# Patient Record
Sex: MALE | Race: WHITE | NOT HISPANIC OR LATINO | Employment: OTHER | URBAN - METROPOLITAN AREA
[De-identification: names, ages, dates, MRNs, and addresses within clinical notes are randomized per-mention and may not be internally consistent; named-entity substitution may affect disease eponyms.]

---

## 2017-01-05 ENCOUNTER — GENERIC CONVERSION - ENCOUNTER (OUTPATIENT)
Dept: OTHER | Facility: OTHER | Age: 62
End: 2017-01-05

## 2017-02-01 ENCOUNTER — GENERIC CONVERSION - ENCOUNTER (OUTPATIENT)
Dept: OTHER | Facility: OTHER | Age: 62
End: 2017-02-01

## 2017-03-24 ENCOUNTER — TRANSCRIBE ORDERS (OUTPATIENT)
Dept: LAB | Facility: MEDICAL CENTER | Age: 62
End: 2017-03-24

## 2017-03-24 ENCOUNTER — APPOINTMENT (OUTPATIENT)
Dept: LAB | Facility: MEDICAL CENTER | Age: 62
End: 2017-03-24
Payer: COMMERCIAL

## 2017-03-24 DIAGNOSIS — I25.10 ATHEROSCLEROTIC HEART DISEASE OF NATIVE CORONARY ARTERY WITHOUT ANGINA PECTORIS: ICD-10-CM

## 2017-03-24 DIAGNOSIS — E55.9 VITAMIN D DEFICIENCY: ICD-10-CM

## 2017-03-24 DIAGNOSIS — E78.5 HYPERLIPIDEMIA: ICD-10-CM

## 2017-03-24 DIAGNOSIS — Z12.5 ENCOUNTER FOR SCREENING FOR MALIGNANT NEOPLASM OF PROSTATE: ICD-10-CM

## 2017-03-24 LAB
25(OH)D3 SERPL-MCNC: 24 NG/ML (ref 30–100)
ALBUMIN SERPL BCP-MCNC: 3.7 G/DL (ref 3.5–5)
ALP SERPL-CCNC: 44 U/L (ref 46–116)
ALT SERPL W P-5'-P-CCNC: 37 U/L (ref 12–78)
ANION GAP SERPL CALCULATED.3IONS-SCNC: 5 MMOL/L (ref 4–13)
AST SERPL W P-5'-P-CCNC: 29 U/L (ref 5–45)
BILIRUB SERPL-MCNC: 0.79 MG/DL (ref 0.2–1)
BUN SERPL-MCNC: 18 MG/DL (ref 5–25)
CALCIUM SERPL-MCNC: 9.3 MG/DL (ref 8.3–10.1)
CHLORIDE SERPL-SCNC: 104 MMOL/L (ref 100–108)
CHOLEST SERPL-MCNC: 186 MG/DL (ref 50–200)
CO2 SERPL-SCNC: 29 MMOL/L (ref 21–32)
CREAT SERPL-MCNC: 1.13 MG/DL (ref 0.6–1.3)
GFR SERPL CREATININE-BSD FRML MDRD: >60 ML/MIN/1.73SQ M
GLUCOSE P FAST SERPL-MCNC: 107 MG/DL (ref 65–99)
HDLC SERPL-MCNC: 50 MG/DL (ref 40–60)
LDLC SERPL CALC-MCNC: 105 MG/DL (ref 0–100)
POTASSIUM SERPL-SCNC: 4.9 MMOL/L (ref 3.5–5.3)
PROT SERPL-MCNC: 7.3 G/DL (ref 6.4–8.2)
PSA SERPL-MCNC: 0.6 NG/ML (ref 0–4)
SODIUM SERPL-SCNC: 138 MMOL/L (ref 136–145)
TRIGL SERPL-MCNC: 154 MG/DL

## 2017-03-24 PROCEDURE — 36415 COLL VENOUS BLD VENIPUNCTURE: CPT

## 2017-03-24 PROCEDURE — G0103 PSA SCREENING: HCPCS

## 2017-03-24 PROCEDURE — 82306 VITAMIN D 25 HYDROXY: CPT

## 2017-03-24 PROCEDURE — 80061 LIPID PANEL: CPT

## 2017-03-24 PROCEDURE — 80053 COMPREHEN METABOLIC PANEL: CPT

## 2017-05-06 ENCOUNTER — OFFICE VISIT (OUTPATIENT)
Dept: URGENT CARE | Facility: CLINIC | Age: 62
End: 2017-05-06
Payer: COMMERCIAL

## 2017-05-06 ENCOUNTER — HOSPITAL ENCOUNTER (OUTPATIENT)
Dept: RADIOLOGY | Facility: CLINIC | Age: 62
Discharge: HOME | End: 2017-05-06
Admitting: EMERGENCY MEDICINE
Payer: COMMERCIAL

## 2017-05-06 DIAGNOSIS — R23.8 OTHER SKIN CHANGES: ICD-10-CM

## 2017-05-06 PROCEDURE — 90471 IMMUNIZATION ADMIN: CPT

## 2017-05-06 PROCEDURE — 90715 TDAP VACCINE 7 YRS/> IM: CPT

## 2017-05-06 PROCEDURE — 73140 X-RAY EXAM OF FINGER(S): CPT

## 2017-05-06 PROCEDURE — 99213 OFFICE O/P EST LOW 20 MIN: CPT

## 2017-05-06 PROCEDURE — 29130 APPL FINGER SPLINT STATIC: CPT

## 2017-09-13 ENCOUNTER — GENERIC CONVERSION - ENCOUNTER (OUTPATIENT)
Dept: OTHER | Facility: OTHER | Age: 62
End: 2017-09-13

## 2018-01-03 ENCOUNTER — TRANSCRIBE ORDERS (OUTPATIENT)
Dept: RADIOLOGY | Facility: MEDICAL CENTER | Age: 63
End: 2018-01-03

## 2018-02-01 DIAGNOSIS — E78.2 MIXED HYPERLIPIDEMIA: Primary | ICD-10-CM

## 2018-02-01 RX ORDER — ROSUVASTATIN CALCIUM 20 MG/1
TABLET, COATED ORAL
Qty: 90 TABLET | Refills: 3 | Status: SHIPPED | OUTPATIENT
Start: 2018-02-01 | End: 2019-03-05 | Stop reason: SDUPTHER

## 2018-02-23 ENCOUNTER — TELEPHONE (OUTPATIENT)
Dept: INTERNAL MEDICINE CLINIC | Facility: CLINIC | Age: 63
End: 2018-02-23

## 2018-02-23 DIAGNOSIS — Z12.5 SCREENING FOR MALIGNANT NEOPLASM OF PROSTATE: ICD-10-CM

## 2018-02-23 DIAGNOSIS — E78.5 HYPERLIPIDEMIA, UNSPECIFIED HYPERLIPIDEMIA TYPE: Primary | ICD-10-CM

## 2018-02-23 DIAGNOSIS — R53.83 FATIGUE, UNSPECIFIED TYPE: ICD-10-CM

## 2018-02-23 DIAGNOSIS — Z13.820 SCREENING FOR OSTEOPOROSIS: ICD-10-CM

## 2018-03-19 ENCOUNTER — TRANSCRIBE ORDERS (OUTPATIENT)
Dept: LAB | Facility: MEDICAL CENTER | Age: 63
End: 2018-03-19

## 2018-03-19 ENCOUNTER — APPOINTMENT (OUTPATIENT)
Dept: LAB | Facility: MEDICAL CENTER | Age: 63
End: 2018-03-19
Payer: COMMERCIAL

## 2018-03-19 DIAGNOSIS — E78.5 HYPERLIPIDEMIA, UNSPECIFIED HYPERLIPIDEMIA TYPE: ICD-10-CM

## 2018-03-19 DIAGNOSIS — R53.83 FATIGUE, UNSPECIFIED TYPE: ICD-10-CM

## 2018-03-19 DIAGNOSIS — Z12.5 SCREENING FOR MALIGNANT NEOPLASM OF PROSTATE: ICD-10-CM

## 2018-03-19 DIAGNOSIS — Z13.820 SCREENING FOR OSTEOPOROSIS: ICD-10-CM

## 2018-03-19 LAB
25(OH)D3 SERPL-MCNC: 21.3 NG/ML (ref 30–100)
ALBUMIN SERPL BCP-MCNC: 3.7 G/DL (ref 3.5–5)
ALP SERPL-CCNC: 44 U/L (ref 46–116)
ALT SERPL W P-5'-P-CCNC: 24 U/L (ref 12–78)
ANION GAP SERPL CALCULATED.3IONS-SCNC: 8 MMOL/L (ref 4–13)
AST SERPL W P-5'-P-CCNC: 24 U/L (ref 5–45)
BASOPHILS # BLD AUTO: 0.01 THOUSANDS/ΜL (ref 0–0.1)
BASOPHILS NFR BLD AUTO: 0 % (ref 0–1)
BILIRUB SERPL-MCNC: 1.14 MG/DL (ref 0.2–1)
BUN SERPL-MCNC: 17 MG/DL (ref 5–25)
CALCIUM SERPL-MCNC: 8.6 MG/DL (ref 8.3–10.1)
CHLORIDE SERPL-SCNC: 106 MMOL/L (ref 100–108)
CHOLEST SERPL-MCNC: 172 MG/DL (ref 50–200)
CO2 SERPL-SCNC: 25 MMOL/L (ref 21–32)
CREAT SERPL-MCNC: 1.11 MG/DL (ref 0.6–1.3)
EOSINOPHIL # BLD AUTO: 0.22 THOUSAND/ΜL (ref 0–0.61)
EOSINOPHIL NFR BLD AUTO: 4 % (ref 0–6)
ERYTHROCYTE [DISTWIDTH] IN BLOOD BY AUTOMATED COUNT: 13.6 % (ref 11.6–15.1)
GFR SERPL CREATININE-BSD FRML MDRD: 70 ML/MIN/1.73SQ M
GLUCOSE P FAST SERPL-MCNC: 103 MG/DL (ref 65–99)
HCT VFR BLD AUTO: 46.6 % (ref 36.5–49.3)
HDLC SERPL-MCNC: 44 MG/DL (ref 40–60)
HGB BLD-MCNC: 16.1 G/DL (ref 12–17)
LDLC SERPL CALC-MCNC: 91 MG/DL (ref 0–100)
LYMPHOCYTES # BLD AUTO: 2.3 THOUSANDS/ΜL (ref 0.6–4.47)
LYMPHOCYTES NFR BLD AUTO: 38 % (ref 14–44)
MAGNESIUM SERPL-MCNC: 2.3 MG/DL (ref 1.6–2.6)
MCH RBC QN AUTO: 30.1 PG (ref 26.8–34.3)
MCHC RBC AUTO-ENTMCNC: 34.5 G/DL (ref 31.4–37.4)
MCV RBC AUTO: 87 FL (ref 82–98)
MONOCYTES # BLD AUTO: 0.57 THOUSAND/ΜL (ref 0.17–1.22)
MONOCYTES NFR BLD AUTO: 9 % (ref 4–12)
NEUTROPHILS # BLD AUTO: 2.95 THOUSANDS/ΜL (ref 1.85–7.62)
NEUTS SEG NFR BLD AUTO: 49 % (ref 43–75)
NRBC BLD AUTO-RTO: 0 /100 WBCS
PLATELET # BLD AUTO: 203 THOUSANDS/UL (ref 149–390)
PMV BLD AUTO: 12 FL (ref 8.9–12.7)
POTASSIUM SERPL-SCNC: 4.3 MMOL/L (ref 3.5–5.3)
PROT SERPL-MCNC: 7.1 G/DL (ref 6.4–8.2)
RBC # BLD AUTO: 5.35 MILLION/UL (ref 3.88–5.62)
SODIUM SERPL-SCNC: 139 MMOL/L (ref 136–145)
TRIGL SERPL-MCNC: 184 MG/DL
WBC # BLD AUTO: 6.07 THOUSAND/UL (ref 4.31–10.16)

## 2018-03-19 PROCEDURE — 36415 COLL VENOUS BLD VENIPUNCTURE: CPT

## 2018-03-19 PROCEDURE — 84154 ASSAY OF PSA FREE: CPT

## 2018-03-19 PROCEDURE — 83735 ASSAY OF MAGNESIUM: CPT

## 2018-03-19 PROCEDURE — 80061 LIPID PANEL: CPT

## 2018-03-19 PROCEDURE — 80053 COMPREHEN METABOLIC PANEL: CPT

## 2018-03-19 PROCEDURE — 82306 VITAMIN D 25 HYDROXY: CPT

## 2018-03-19 PROCEDURE — 84153 ASSAY OF PSA TOTAL: CPT

## 2018-03-19 PROCEDURE — 85025 COMPLETE CBC W/AUTO DIFF WBC: CPT

## 2018-03-20 LAB
PSA FREE MFR SERPL: 36.7 %
PSA FREE SERPL-MCNC: 0.22 NG/ML
PSA SERPL-MCNC: 0.6 NG/ML (ref 0–4)

## 2018-03-23 RX ORDER — NITROGLYCERIN 0.4 MG/1
1 TABLET SUBLINGUAL
COMMUNITY
Start: 2016-03-29 | End: 2018-11-01 | Stop reason: SDUPTHER

## 2018-03-23 RX ORDER — ESCITALOPRAM OXALATE 10 MG/1
1 TABLET ORAL DAILY
COMMUNITY
End: 2018-03-28 | Stop reason: SDUPTHER

## 2018-03-23 RX ORDER — OMEPRAZOLE 40 MG/1
CAPSULE, DELAYED RELEASE ORAL DAILY
COMMUNITY
Start: 2016-12-07 | End: 2018-03-28 | Stop reason: ALTCHOICE

## 2018-03-28 ENCOUNTER — OFFICE VISIT (OUTPATIENT)
Dept: INTERNAL MEDICINE CLINIC | Facility: CLINIC | Age: 63
End: 2018-03-28
Payer: COMMERCIAL

## 2018-03-28 VITALS
SYSTOLIC BLOOD PRESSURE: 124 MMHG | HEART RATE: 78 BPM | HEIGHT: 69 IN | BODY MASS INDEX: 38.4 KG/M2 | DIASTOLIC BLOOD PRESSURE: 70 MMHG | WEIGHT: 259.25 LBS | TEMPERATURE: 95.9 F | OXYGEN SATURATION: 96 %

## 2018-03-28 DIAGNOSIS — S29.019D THORACIC MYOFASCIAL STRAIN, SUBSEQUENT ENCOUNTER: Primary | ICD-10-CM

## 2018-03-28 DIAGNOSIS — I25.10 ATHEROSCLEROSIS OF NATIVE CORONARY ARTERY OF NATIVE HEART WITHOUT ANGINA PECTORIS: ICD-10-CM

## 2018-03-28 DIAGNOSIS — F41.9 ANXIETY: ICD-10-CM

## 2018-03-28 DIAGNOSIS — Z12.11 SCREENING FOR MALIGNANT NEOPLASM OF COLON: ICD-10-CM

## 2018-03-28 DIAGNOSIS — Z12.5 SCREENING FOR MALIGNANT NEOPLASM OF PROSTATE: ICD-10-CM

## 2018-03-28 DIAGNOSIS — I10 BENIGN ESSENTIAL HYPERTENSION: ICD-10-CM

## 2018-03-28 PROBLEM — M19.90 JOINT INFLAMMATION: Status: ACTIVE | Noted: 2017-05-30

## 2018-03-28 PROBLEM — S29.019A THORACIC MYOFASCIAL STRAIN: Status: ACTIVE | Noted: 2018-03-28

## 2018-03-28 PROCEDURE — 99396 PREV VISIT EST AGE 40-64: CPT | Performed by: INTERNAL MEDICINE

## 2018-03-28 RX ORDER — ESCITALOPRAM OXALATE 10 MG/1
10 TABLET ORAL DAILY
Qty: 90 TABLET | Refills: 3 | Status: SHIPPED | OUTPATIENT
Start: 2018-03-28 | End: 2019-09-20

## 2018-03-28 RX ORDER — BACLOFEN 10 MG/1
10 TABLET ORAL 2 TIMES DAILY
Qty: 20 TABLET | Refills: 0 | Status: SHIPPED | OUTPATIENT
Start: 2018-03-28 | End: 2018-12-06

## 2018-03-28 RX ORDER — NAPROXEN 500 MG/1
250 TABLET ORAL 2 TIMES DAILY WITH MEALS
Qty: 20 TABLET | Refills: 0 | Status: SHIPPED | OUTPATIENT
Start: 2018-03-28 | End: 2018-12-06

## 2018-03-28 NOTE — PROGRESS NOTES
Assessment/Plan:         Diagnoses and all orders for this visit:    Thoracic myofascial strain, subsequent encounter  Trial Naproxen and Baclofen for back pain    Screening for malignant neoplasm of colon  -     Occult blood 1-3, stool; Future      Anxiety  -     escitalopram (LEXAPRO) 10 mg tablet; Take 1 tablet (10 mg total) by mouth daily for 90 days    Atherosclerosis of native coronary artery of native heart without angina pectoris  Had cardiology appt in September, stable  No changes    Benign essential hypertension  Weight loss encouraged and discussed diet changes to assist and walking program    Other orders  -     escitalopram (LEXAPRO) 10 mg tablet; Take 1 tablet by mouth daily  -     Garlic Oil 5108 MG CAPS; Take 2 capsules by mouth daily  -     nitroglycerin (NITROSTAT) 0 4 mg SL tablet; Place 1 tablet under the tongue every 5 (five) minutes as needed  -     Discontinue: omeprazole (PriLOSEC) 40 MG capsule; Take by mouth daily  -     Discontinue: cholecalciferol (VITAMIN D3) 1,000 units tablet; Take by mouth  -     TURMERIC PO; Take by mouth       Patient ID: Gloria Clark is a 60 y o  male  HPI   Patient doing ok  Strained his back this week fixing a tractor  Had been using old muscle relaxant  No chest pain or sob  No falls  Saw cardio in September  Had labs recently    The following portions of the patient's history were reviewed and updated as appropriate:   Past Medical History:   Diagnosis Date    Cardiac disease     Myocardial infarction      Social History     Social History    Marital status: /Civil Union     Spouse name: N/A    Number of children: N/A    Years of education: N/A     Occupational History    Not on file       Social History Main Topics    Smoking status: Never Smoker    Smokeless tobacco: Never Used    Alcohol use No    Drug use: No    Sexual activity: Not on file     Other Topics Concern    Not on file     Social History Narrative    Dental care, regularly    Good dental hygiene    No caffeine use    Sun protection sunscreen         Allergies   Allergen Reactions    Penicillins Hives    Percocet [Oxycodone-Acetaminophen] Other (See Comments)     hallucinations     Past Surgical History:   Procedure Laterality Date    CORONARY STENT PLACEMENT      HERNIA REPAIR      OTHER SURGICAL HISTORY  09/18/2010    Arterial catheterization    TONSILLECTOMY AND ADENOIDECTOMY      TOTAL HIP ARTHROPLASTY       Family History   Problem Relation Age of Onset    Breast cancer Mother     Coronary artery disease Mother     Diabetes Mother     Hyperlipidemia Mother     Atrial fibrillation Father        Review of Systems   Constitutional: Negative  HENT: Negative  Eyes: Negative  Respiratory: Positive for shortness of breath  Cardiovascular: Negative  Gastrointestinal: Negative  Endocrine: Negative  Genitourinary: Negative  Musculoskeletal: Positive for back pain  Skin: Negative  Allergic/Immunologic: Negative  Neurological: Negative  Hematological: Negative  Psychiatric/Behavioral: Negative  /70   Pulse 78   Temp (!) 95 9 °F (35 5 °C) (Tympanic)   Ht 5' 9" (1 753 m)   Wt 118 kg (259 lb 4 oz)   SpO2 96%   BMI 38 28 kg/m²      Physical Exam   Constitutional: He is oriented to person, place, and time  He appears well-developed and well-nourished  No distress  HENT:   Head: Normocephalic and atraumatic  Right Ear: External ear normal    Left Ear: External ear normal    Nose: Nose normal    Mouth/Throat: Oropharynx is clear and moist  No oropharyngeal exudate  Eyes: Conjunctivae and EOM are normal  Pupils are equal, round, and reactive to light  No scleral icterus  Neck: Normal range of motion  Neck supple  No JVD present  Cardiovascular: Normal rate, regular rhythm, normal heart sounds and intact distal pulses  Pulmonary/Chest: Effort normal and breath sounds normal    Abdominal: Soft   Bowel sounds are normal    Musculoskeletal: He exhibits tenderness and deformity  He exhibits no edema  Lymphadenopathy:     He has no cervical adenopathy  Neurological: He is alert and oriented to person, place, and time  He has normal reflexes  He displays normal reflexes  No cranial nerve deficit  He exhibits normal muscle tone  Coordination normal    Skin: Skin is warm and dry  No rash noted  He is not diaphoretic  No erythema  No pallor  Psychiatric: He has a normal mood and affect  His behavior is normal  Judgment and thought content normal    Nursing note and vitals reviewed

## 2018-06-28 ENCOUNTER — TELEPHONE (OUTPATIENT)
Dept: INTERNAL MEDICINE CLINIC | Facility: CLINIC | Age: 63
End: 2018-06-28

## 2018-06-28 DIAGNOSIS — R21 RASH: Primary | ICD-10-CM

## 2018-06-28 RX ORDER — METHYLPREDNISOLONE 4 MG/1
TABLET ORAL
Qty: 21 TABLET | Refills: 0 | Status: SHIPPED | OUTPATIENT
Start: 2018-06-28 | End: 2018-12-06

## 2018-06-28 RX ORDER — TRIAMCINOLONE ACETONIDE 1 MG/G
CREAM TOPICAL 2 TIMES DAILY
Qty: 80 G | Refills: 0 | Status: SHIPPED | OUTPATIENT
Start: 2018-06-28 | End: 2018-12-06

## 2018-11-01 DIAGNOSIS — I25.10 ATHEROSCLEROSIS OF NATIVE CORONARY ARTERY WITHOUT ANGINA PECTORIS, UNSPECIFIED WHETHER NATIVE OR TRANSPLANTED HEART: Primary | ICD-10-CM

## 2018-11-01 RX ORDER — NITROGLYCERIN 0.4 MG/1
0.4 TABLET SUBLINGUAL
Qty: 25 TABLET | Refills: 3 | Status: SHIPPED | OUTPATIENT
Start: 2018-11-01

## 2018-12-06 ENCOUNTER — OFFICE VISIT (OUTPATIENT)
Dept: URGENT CARE | Facility: CLINIC | Age: 63
End: 2018-12-06
Payer: COMMERCIAL

## 2018-12-06 ENCOUNTER — APPOINTMENT (EMERGENCY)
Dept: RADIOLOGY | Facility: HOSPITAL | Age: 63
End: 2018-12-06
Payer: COMMERCIAL

## 2018-12-06 ENCOUNTER — TELEPHONE (OUTPATIENT)
Dept: INTERNAL MEDICINE CLINIC | Facility: CLINIC | Age: 63
End: 2018-12-06

## 2018-12-06 ENCOUNTER — HOSPITAL ENCOUNTER (EMERGENCY)
Facility: HOSPITAL | Age: 63
Discharge: HOME | End: 2018-12-06
Attending: EMERGENCY MEDICINE | Admitting: EMERGENCY MEDICINE
Payer: COMMERCIAL

## 2018-12-06 VITALS
WEIGHT: 261.47 LBS | RESPIRATION RATE: 18 BRPM | OXYGEN SATURATION: 98 % | BODY MASS INDEX: 38.73 KG/M2 | DIASTOLIC BLOOD PRESSURE: 73 MMHG | TEMPERATURE: 97.6 F | SYSTOLIC BLOOD PRESSURE: 141 MMHG | HEIGHT: 69 IN | HEART RATE: 50 BPM

## 2018-12-06 VITALS
OXYGEN SATURATION: 93 % | HEIGHT: 69 IN | DIASTOLIC BLOOD PRESSURE: 80 MMHG | BODY MASS INDEX: 38.21 KG/M2 | HEART RATE: 56 BPM | SYSTOLIC BLOOD PRESSURE: 150 MMHG | WEIGHT: 258 LBS | TEMPERATURE: 98.2 F | RESPIRATION RATE: 20 BRPM

## 2018-12-06 DIAGNOSIS — R07.9 CHEST PAIN, UNSPECIFIED TYPE: Primary | ICD-10-CM

## 2018-12-06 DIAGNOSIS — R07.9 LEFT SIDED CHEST PAIN: Primary | ICD-10-CM

## 2018-12-06 LAB
ALBUMIN SERPL BCP-MCNC: 3.5 G/DL (ref 3.5–5)
ALP SERPL-CCNC: 45 U/L (ref 46–116)
ALT SERPL W P-5'-P-CCNC: 33 U/L (ref 12–78)
ANION GAP SERPL CALCULATED.3IONS-SCNC: 8 MMOL/L (ref 4–13)
APTT PPP: 31 SECONDS (ref 26–38)
AST SERPL W P-5'-P-CCNC: 24 U/L (ref 5–45)
BASOPHILS # BLD AUTO: 0.02 THOUSANDS/ΜL (ref 0–0.1)
BASOPHILS NFR BLD AUTO: 0 % (ref 0–1)
BILIRUB SERPL-MCNC: 0.6 MG/DL (ref 0.2–1)
BUN SERPL-MCNC: 23 MG/DL (ref 5–25)
CALCIUM SERPL-MCNC: 8.9 MG/DL (ref 8.3–10.1)
CHLORIDE SERPL-SCNC: 103 MMOL/L (ref 100–108)
CO2 SERPL-SCNC: 28 MMOL/L (ref 21–32)
CREAT SERPL-MCNC: 1.21 MG/DL (ref 0.6–1.3)
EOSINOPHIL # BLD AUTO: 0.26 THOUSAND/ΜL (ref 0–0.61)
EOSINOPHIL NFR BLD AUTO: 4 % (ref 0–6)
ERYTHROCYTE [DISTWIDTH] IN BLOOD BY AUTOMATED COUNT: 12.8 % (ref 11.6–15.1)
GFR SERPL CREATININE-BSD FRML MDRD: 63 ML/MIN/1.73SQ M
GLUCOSE SERPL-MCNC: 95 MG/DL (ref 65–140)
HCT VFR BLD AUTO: 46 % (ref 36.5–49.3)
HGB BLD-MCNC: 15.9 G/DL (ref 12–17)
IMM GRANULOCYTES # BLD AUTO: 0.02 THOUSAND/UL (ref 0–0.2)
IMM GRANULOCYTES NFR BLD AUTO: 0 % (ref 0–2)
INR PPP: 1.06 (ref 0.86–1.17)
LIPASE SERPL-CCNC: 245 U/L (ref 73–393)
LYMPHOCYTES # BLD AUTO: 2.74 THOUSANDS/ΜL (ref 0.6–4.47)
LYMPHOCYTES NFR BLD AUTO: 38 % (ref 14–44)
MCH RBC QN AUTO: 30.4 PG (ref 26.8–34.3)
MCHC RBC AUTO-ENTMCNC: 34.6 G/DL (ref 31.4–37.4)
MCV RBC AUTO: 88 FL (ref 82–98)
MONOCYTES # BLD AUTO: 0.63 THOUSAND/ΜL (ref 0.17–1.22)
MONOCYTES NFR BLD AUTO: 9 % (ref 4–12)
NEUTROPHILS # BLD AUTO: 3.63 THOUSANDS/ΜL (ref 1.85–7.62)
NEUTS SEG NFR BLD AUTO: 49 % (ref 43–75)
NRBC BLD AUTO-RTO: 0 /100 WBCS
PLATELET # BLD AUTO: 207 THOUSANDS/UL (ref 149–390)
PMV BLD AUTO: 10.3 FL (ref 8.9–12.7)
POTASSIUM SERPL-SCNC: 4.2 MMOL/L (ref 3.5–5.3)
PROT SERPL-MCNC: 6.8 G/DL (ref 6.4–8.2)
PROTHROMBIN TIME: 13.3 SECONDS (ref 11.8–14.2)
RBC # BLD AUTO: 5.23 MILLION/UL (ref 3.88–5.62)
SODIUM SERPL-SCNC: 139 MMOL/L (ref 136–145)
TROPONIN I SERPL-MCNC: <0.02 NG/ML
TROPONIN I SERPL-MCNC: <0.02 NG/ML
WBC # BLD AUTO: 7.3 THOUSAND/UL (ref 4.31–10.16)

## 2018-12-06 PROCEDURE — 85610 PROTHROMBIN TIME: CPT | Performed by: PHYSICIAN ASSISTANT

## 2018-12-06 PROCEDURE — 71046 X-RAY EXAM CHEST 2 VIEWS: CPT

## 2018-12-06 PROCEDURE — 93005 ELECTROCARDIOGRAM TRACING: CPT | Performed by: PHYSICIAN ASSISTANT

## 2018-12-06 PROCEDURE — 85730 THROMBOPLASTIN TIME PARTIAL: CPT | Performed by: PHYSICIAN ASSISTANT

## 2018-12-06 PROCEDURE — 85025 COMPLETE CBC W/AUTO DIFF WBC: CPT | Performed by: PHYSICIAN ASSISTANT

## 2018-12-06 PROCEDURE — 80053 COMPREHEN METABOLIC PANEL: CPT | Performed by: PHYSICIAN ASSISTANT

## 2018-12-06 PROCEDURE — 99213 OFFICE O/P EST LOW 20 MIN: CPT | Performed by: PHYSICIAN ASSISTANT

## 2018-12-06 PROCEDURE — 83690 ASSAY OF LIPASE: CPT | Performed by: PHYSICIAN ASSISTANT

## 2018-12-06 PROCEDURE — 96374 THER/PROPH/DIAG INJ IV PUSH: CPT

## 2018-12-06 PROCEDURE — 36415 COLL VENOUS BLD VENIPUNCTURE: CPT | Performed by: PHYSICIAN ASSISTANT

## 2018-12-06 PROCEDURE — 84484 ASSAY OF TROPONIN QUANT: CPT | Performed by: PHYSICIAN ASSISTANT

## 2018-12-06 PROCEDURE — 93005 ELECTROCARDIOGRAM TRACING: CPT

## 2018-12-06 PROCEDURE — 99285 EMERGENCY DEPT VISIT HI MDM: CPT

## 2018-12-06 RX ORDER — ESCITALOPRAM OXALATE 10 MG/1
TABLET ORAL
COMMUNITY
Start: 2018-12-03 | End: 2019-03-05

## 2018-12-06 RX ORDER — KETOROLAC TROMETHAMINE 30 MG/ML
15 INJECTION, SOLUTION INTRAMUSCULAR; INTRAVENOUS ONCE
Status: COMPLETED | OUTPATIENT
Start: 2018-12-06 | End: 2018-12-06

## 2018-12-06 RX ADMIN — KETOROLAC TROMETHAMINE 15 MG: 30 INJECTION, SOLUTION INTRAMUSCULAR at 14:14

## 2018-12-06 NOTE — TELEPHONE ENCOUNTER
Can offer 12 45 appt tomorrow (Friday) but if he is having any chest pain with these sxs or sxs change/worsen   Should go to the ER today

## 2018-12-06 NOTE — TELEPHONE ENCOUNTER
Spoke with pt wife again and she said he's going to see how he feels in the next few days per PT and if he needs to he will go to the er  I did let her know that if any symptoms worsen or change to please have him go to the ER    MARK

## 2018-12-06 NOTE — TELEPHONE ENCOUNTER
Pt wife called that Ginny Fenton is having soreness on left side under his arm toward his chest for a few days  Wondering what to do?

## 2018-12-06 NOTE — PROGRESS NOTES
[address] [facility]  ([facility]) [phone]  (fax) [phone]        NAME: Prabhjot Bey is a 58 y o  male  : 1955    MRN: [id number]  DATE: 2018  TIME: 12:11 PM    Assessment and Plan   Chest pain, unspecified type [R07 9]  1  Chest pain, unspecified type  POCT ECG    Transfer to other facility       Patient Instructions   EKG appears to have no acute ST elevation, + bradycardia  Called and spoke with Juana Gamboa to update her on patient's status  Patient's wife to drive him to [address] [facility] via private vehicle  Patient left clinic in no acute distress  Proceed to ER  Chief Complaint     Chief Complaint   Patient presents with    Chest Pain     left sided chest pain with numbness going down left arm x 2 days          History of Present Illness   Prabhjot Bey presents to the clinic c/o    Denies any trauma to area, no recent falls, denies lifting anything overly heavy  Patient does follow with cardio  Had 3 stents placed  Patient did not take any nitro for this pain  Chest Pain    This is a new problem  The current episode started in the past 7 days  The onset quality is undetermined  The problem occurs constantly  The problem has been unchanged  The pain is present in the substernal region and lateral region  The pain is mild  The quality of the pain is described as dull  The pain radiates to the left arm and left shoulder  Associated symptoms include a cough and numbness (left arm)  Pertinent negatives include no abdominal pain, back pain, diaphoresis, dizziness, fever, headaches, nausea, palpitations, shortness of breath, vomiting or weakness  Review of Systems   Review of Systems   Constitutional: Negative for activity change, appetite change, chills, diaphoresis, fatigue and fever     HENT: Negative for congestion, ear discharge, ear pain, facial swelling, rhinorrhea, sinus pain, sinus pressure, sneezing and sore throat  Eyes: Negative for photophobia, pain, discharge, redness, itching and visual disturbance  Respiratory: Positive for cough  Negative for apnea, chest tightness, shortness of breath and wheezing  Cardiovascular: Positive for chest pain  Negative for palpitations  Gastrointestinal: Negative for abdominal distention, abdominal pain, anal bleeding, blood in stool, constipation, diarrhea, nausea and vomiting  Genitourinary: Negative for dysuria, flank pain, frequency, hematuria and urgency  Musculoskeletal: Negative for arthralgias, back pain, gait problem, joint swelling, myalgias, neck pain and neck stiffness  Skin: Negative for color change, rash and wound  Allergic/Immunologic: Negative for immunocompromised state  Neurological: Positive for numbness (left arm)  Negative for dizziness, facial asymmetry, weakness and headaches  Hematological: Negative for adenopathy  Psychiatric/Behavioral: Negative for confusion and decreased concentration           Current Medications     Long-Term Prescriptions   Medication Sig Dispense Refill    nitroglycerin (NITROSTAT) 0 4 mg SL tablet Place 1 tablet (0 4 mg total) under the tongue every 5 (five) minutes as needed for chest pain 25 tablet 3    rosuvastatin (CRESTOR) 20 MG tablet TAKE 1 TABLET DAILY 90 tablet 3    escitalopram (LEXAPRO) 10 mg tablet Take 1 tablet (10 mg total) by mouth daily for 90 days 90 tablet 3    escitalopram (LEXAPRO) 10 mg tablet          Current Allergies     Allergies as of 12/06/2018 - Reviewed 12/06/2018   Allergen Reaction Noted    Other Other (See Comments)     Oxycodone-acetaminophen Other (See Comments)     Percocet [oxycodone-acetaminophen] Other (See Comments) 01/27/2016    Penicillins Hives and Rash 01/05/2012            The following portions of the patient's history were reviewed and updated as appropriate: allergies, current medications, past family history, past medical history, past social history, past surgical history and problem list   Past Medical History:   Diagnosis Date    Cardiac disease     Myocardial infarction [facility])      Past Surgical History:   Procedure Laterality Date    CORONARY STENT PLACEMENT      HERNIA REPAIR      OTHER SURGICAL HISTORY  09/18/2010    Arterial catheterization    TONSILLECTOMY AND ADENOIDECTOMY      TOTAL HIP ARTHROPLASTY       Social History     Social History    Marital status: /Civil Union     Spouse name: N/A    Number of children: N/A    Years of education: N/A     Occupational History    Not on file  Social History Main Topics    Smoking status: Never Smoker    Smokeless tobacco: Never Used    Alcohol use No    Drug use: No    Sexual activity: Not on file     Other Topics Concern    Not on file     Social History Narrative    Dental care, regularly    Good dental hygiene    No caffeine use    Sun protection sunscreen           Objective   /80   Pulse 56   Temp 98 2 °F (36 8 °C)   Resp 20   Ht 5' 9" (1 753 m)   Wt 117 kg (258 lb)   SpO2 93%   BMI 38 10 kg/m²      Physical Exam     Physical Exam   Constitutional: He is oriented to person, place, and time  He appears well-developed and well-nourished  No distress  HENT:   Head: Normocephalic and atraumatic  Eyes: Pupils are equal, round, and reactive to light  Conjunctivae are normal  Right eye exhibits no discharge  Left eye exhibits no discharge  Cardiovascular: Regular rhythm and normal heart sounds  Exam reveals no gallop and no friction rub  No murmur heard  Pulmonary/Chest: Effort normal and breath sounds normal  No respiratory distress  He has no decreased breath sounds  He has no wheezes  He has no rhonchi  He has no rales  He exhibits no tenderness  Abdominal: Soft  Bowel sounds are normal  He exhibits no distension and no mass  There is no tenderness  There is no rebound and no guarding  Neurological: He is alert and oriented to person, place, and time  Coordination normal    Skin: Skin is warm and dry  No rash noted  He is not diaphoretic  No erythema  No pallor         Leana Granda PA-C

## 2018-12-06 NOTE — DISCHARGE INSTRUCTIONS

## 2018-12-06 NOTE — ED PROVIDER NOTES
History  Chief Complaint   Patient presents with    Chest Pain     intermittent   left sided chest pain for 3 days  More constant today  History of MI in 2010 with stents  History provided by:  Patient and medical records  Chest Pain   Pain location:  L chest and L lateral chest  Pain quality: aching    Pain radiates to:  L arm  Pain radiates to the back: no    Pain severity:  Mild  Onset quality:  Gradual  Duration:  3 days  Timing:  Constant  Progression:  Waxing and waning  Chronicity:  New  Context: movement and at rest    Context: not breathing, no drug use, not eating, not lifting, not raising an arm, no stress and no trauma    Context comment:  More noticeable when resting and riding tractor sitting still  goes away when he's busy and active or distracted  Relieved by:  Nothing  Worsened by:  Nothing tried  Ineffective treatments:  None tried  Associated symptoms: cough (pt reports chronic cough for weeks to months  worse in past 2 days after sweeping his chimney dust)    Associated symptoms: no abdominal pain, no altered mental status, no anorexia, no anxiety, no back pain, no claudication, no diaphoresis, no dizziness, no dysphagia, no fatigue, no fever, no headache, no heartburn, no lower extremity edema, no nausea, no near-syncope, no numbness, no orthopnea, no palpitations, no PND, no shortness of breath, no syncope, not vomiting and no weakness    Cough:     Cough characteristics:  Dry and non-productive    Timing:  Intermittent    Chronicity:  Chronic  Risk factors: coronary artery disease, high cholesterol, male sex and obesity    Risk factors: no aortic disease, no birth control, no diabetes mellitus, no hypertension, no prior DVT/PE and no smoking        Prior to Admission Medications   Prescriptions Last Dose Informant Patient Reported? Taking? Garlic Oil 6236 MG CAPS   Yes No   Sig: Take 2 capsules by mouth daily   aspirin 81 MG tablet   Yes No   Sig: Take 81 mg by mouth daily  escitalopram (LEXAPRO) 10 mg tablet   No No   Sig: Take 1 tablet (10 mg total) by mouth daily for 90 days   escitalopram (LEXAPRO) 10 mg tablet   Yes No   nitroglycerin (NITROSTAT) 0 4 mg SL tablet   No No   Sig: Place 1 tablet (0 4 mg total) under the tongue every 5 (five) minutes as needed for chest pain   rosuvastatin (CRESTOR) 20 MG tablet   No No   Sig: TAKE 1 TABLET DAILY      Facility-Administered Medications: None       Past Medical History:   Diagnosis Date    Cardiac disease     Hypertension     Myocardial infarction [facility])        Past Surgical History:   Procedure Laterality Date    CORONARY STENT PLACEMENT      HERNIA REPAIR      OTHER SURGICAL HISTORY  09/18/2010    Arterial catheterization    TONSILLECTOMY AND ADENOIDECTOMY      TOTAL HIP ARTHROPLASTY         Family History   Problem Relation Age of Onset    Breast cancer Mother     Coronary artery disease Mother     Diabetes Mother     Hyperlipidemia Mother     Atrial fibrillation Father      I have reviewed and agree with the history as documented  Social History   Substance Use Topics    Smoking status: Never Smoker    Smokeless tobacco: Never Used    Alcohol use No        Review of Systems   Constitutional: Negative for activity change, appetite change, chills, diaphoresis, fatigue and fever  HENT: Negative for congestion, ear pain, facial swelling, hearing loss, rhinorrhea, sinus pressure, sore throat and trouble swallowing  Eyes: Negative for photophobia, pain, redness, itching and visual disturbance  Respiratory: Positive for cough (pt reports chronic cough for weeks to months  worse in past 2 days after sweeping his chimney dust)  Negative for chest tightness, shortness of breath and wheezing  Cardiovascular: Positive for chest pain  Negative for palpitations, orthopnea, claudication, leg swelling, syncope, PND and near-syncope     Gastrointestinal: Negative for abdominal pain, anorexia, constipation, diarrhea, heartburn, nausea and vomiting  Genitourinary: Negative for dysuria, flank pain, frequency, hematuria and urgency  Musculoskeletal: Negative for arthralgias, back pain, myalgias and neck pain  Skin: Negative for rash and wound  Allergic/Immunologic: Negative for immunocompromised state  Neurological: Negative for dizziness, syncope, weakness, light-headedness, numbness and headaches  Physical Exam  Physical Exam   Constitutional: He is oriented to person, place, and time  He appears well-developed and well-nourished  No distress  HENT:   Head: Normocephalic and atraumatic  Nose: Nose normal    Mouth/Throat: Oropharynx is clear and moist    Eyes: Pupils are equal, round, and reactive to light  Conjunctivae are normal    Neck: Neck supple  Cardiovascular: Normal rate, regular rhythm, normal heart sounds and intact distal pulses  No murmur heard  Pulmonary/Chest: Effort normal and breath sounds normal  No respiratory distress  He has no wheezes  He has no rales  He exhibits no tenderness  Abdominal: Soft  Bowel sounds are normal    Musculoskeletal: He exhibits no edema or tenderness  Neurological: He is alert and oriented to person, place, and time  Skin: Skin is warm and dry  Capillary refill takes less than 2 seconds  No rash noted  He is not diaphoretic  No erythema  No pallor  Psychiatric: He has a normal mood and affect  Nursing note and vitals reviewed        Vital Signs  ED Triage Vitals [12/06/18 1229]   Temperature Pulse Respirations Blood Pressure SpO2   97 6 °F (36 4 °C) 55 17 134/67 95 %      Temp Source Heart Rate Source Patient Position - Orthostatic VS BP Location FiO2 (%)   Temporal Monitor Lying Left arm --      Pain Score       3           Vitals:    12/06/18 1229 12/06/18 1414 12/06/18 1600   BP: 134/67 146/66 141/73   Pulse: 55 (!) 50 (!) 50   Patient Position - Orthostatic VS: Lying         Visual Acuity      ED Medications  Medications   ketorolac (TORADOL) injection 15 mg (15 mg Intravenous Given 12/6/18 1414)       Diagnostic Studies  Results Reviewed     Procedure Component Value Units Date/Time    Troponin I [[id number]]  (Normal) Collected:  12/06/18 1540    Lab Status:  Final result Specimen:  Blood from Arm, Left Updated:  12/06/18 1603     Troponin I <0 02 ng/mL     Troponin I [[id number]]  (Normal) Collected:  12/06/18 1236    Lab Status:  Final result Specimen:  Blood from Arm, Right Updated:  12/06/18 1309     Troponin I <0 02 ng/mL     Comprehensive metabolic panel [[id number]]  (Abnormal) Collected:  12/06/18 1236    Lab Status:  Final result Specimen:  Blood from Arm, Right Updated:  12/06/18 1304     Sodium 139 mmol/L      Potassium 4 2 mmol/L      Chloride 103 mmol/L      CO2 28 mmol/L      ANION GAP 8 mmol/L      BUN 23 mg/dL      Creatinine 1 21 mg/dL      Glucose 95 mg/dL      Calcium 8 9 mg/dL      AST 24 U/L      ALT 33 U/L      Alkaline Phosphatase 45 (L) U/L      Total Protein 6 8 g/dL      Albumin 3 5 g/dL      Total Bilirubin 0 60 mg/dL      eGFR 63 ml/min/1 73sq m     Narrative:         National Kidney Disease Education Program recommendations are as follows:  GFR calculation is accurate only with a steady state creatinine  Chronic Kidney disease less than 60 ml/min/1 73 sq  meters  Kidney failure less than 15 ml/min/1 73 sq  meters      Lipase [[id number]]  (Normal) Collected:  12/06/18 1236    Lab Status:  Final result Specimen:  Blood from Arm, Right Updated:  12/06/18 1304     Lipase 245 u/L     Protime-INR [[id number]]  (Normal) Collected:  12/06/18 1236    Lab Status:  Final result Specimen:  Blood from Arm, Right Updated:  12/06/18 1252     Protime 13 3 seconds      INR 1 06    APTT [[id number]]  (Normal) Collected:  12/06/18 1236    Lab Status:  Final result Specimen:  Blood from Arm, Right Updated:  12/06/18 1252     PTT 31 seconds     CBC and differential [[id number]] Collected:  12/06/18 1236    Lab Status:  Final result Specimen:  Blood from Arm, Right Updated:  12/06/18 1241     WBC 7 30 Thousand/uL      RBC 5 23 Million/uL      Hemoglobin 15 9 g/dL      Hematocrit 46 0 %      MCV 88 fL      MCH 30 4 pg      MCHC 34 6 g/dL      RDW 12 8 %      MPV 10 3 fL      Platelets 069 Thousands/uL      nRBC 0 /100 WBCs      Neutrophils Relative 49 %      Immat GRANS % 0 %      Lymphocytes Relative 38 %      Monocytes Relative 9 %      Eosinophils Relative 4 %      Basophils Relative 0 %      Neutrophils Absolute 3 63 Thousands/µL      Immature Grans Absolute 0 02 Thousand/uL      Lymphocytes Absolute 2 74 Thousands/µL      Monocytes Absolute 0 63 Thousand/µL      Eosinophils Absolute 0 26 Thousand/µL      Basophils Absolute 0 02 Thousands/µL                  XR chest 2 views   Final Result by Pawel Terrazas MD (12/06 1329)      Normal chest              Workstation performed: [id number]                    Procedures  ECG 12 Lead Documentation  Date/Time: 12/6/2018 12:28 PM  Performed by: Edilberto Montalvo  Authorized by: Edilberto Montalvo     Indications / Diagnosis:  Chest pain  ECG reviewed by me, the ED Provider: yes    Patient location:  ED  Previous ECG:     Previous ECG:  Compared to current    Similarity:  No change  Rate:     ECG rate:  57  Rhythm:     Rhythm: sinus bradycardia    Ectopy:     Ectopy: none    QRS:     QRS axis:  Normal  Conduction:     Conduction: normal    ST segments:     ST segments:  Normal  T waves:     T waves: normal    Q waves:     Q waves:  III    ECG 12 Lead Documentation  Date/Time: 12/6/2018 3:44 PM  Performed by: Edilberto Montalvo  Authorized by: Edilberto Montalvo     Indications / Diagnosis:  Delta 3 hr cp  ECG reviewed by me, the ED Provider: yes    Patient location:  ED  Previous ECG:     Previous ECG:  Compared to current    Comparison ECG info:  3 hr prior    Similarity:  No change  Rate:     ECG rate:  52  Rhythm:     Rhythm: sinus bradycardia    Ectopy:     Ectopy: none    QRS:     QRS axis:  Normal  Conduction: Conduction: normal    ST segments:     ST segments:  Normal  T waves:     T waves: normal    Q waves:     Q waves:  III        Phone Contacts  ED Phone Contact    ED Course  ED Course as of Dec 06 1626   Thu Dec 06, 2018   1308 WBC: 7 30   1308 Hemoglobin: 15 9   1308 HCT: 46 0   1308 Platelet Count: 753   1308 INR: 1 06   1308 Protime: 13 3   1308 PTT: 31   1308 Lipase: 245   1308 Sodium: 139   1308 Potassium: 4 2   1308 Chloride: 103   1308 CO2: 28   1308 Anion Gap: 8   1308 BUN: 23   1308 Creatinine: 1 21   1308 Glucose, Random: 95   1308 eGFR: 63   1317 Troponin I: <0 02   1358 Reviewed normal results thus far with patient  Offered 3 hr delta troponin, discussed HEART score 3 and MACE reduction with 3 hr delta  Pt and wife agreeable to same  Will treat with toradol in meantime for suspected musculoskeletal component of pain  1603 Troponin I: <0 02   1616 Pt with negative delta ekg/trop  He is eager for discharge  Up ambulatory in department, stable  Will continue to utilize motrin/tylenol for chest/arm discomfort that is minimal  Stress ordered and cc'ed to his cardiologist     I called pcp office dr Ximena Prince office no answer may be out of office for the day  Initially pt was offered 1245 appt for tomorrow Friday but is not scheduled in [facility] I was hoping to establish that visit time for patient for tomorrow  Pt will call in AM to see if appt slot still available            HEART Risk Score      Most Recent Value   History  0 Filed at: 12/06/2018 1318   ECG  0 Filed at: 12/06/2018 1318   Age  1 Filed at: 12/06/2018 1318   Risk Factors  2 Filed at: 12/06/2018 1318   Troponin  0 Filed at: 12/06/2018 1318   Heart Score Risk Calculator   History  0 Filed at: 12/06/2018 1318   ECG  0 Filed at: 12/06/2018 1318   Age  1 Filed at: 12/06/2018 1318   Risk Factors  2 Filed at: 12/06/2018 1318   Troponin  0 Filed at: 12/06/2018 1318   HEART Score  3 Filed at: 12/06/2018 1318   HEART Score  3 Filed at: 12/06/2018 1318 MDM  Number of Diagnoses or Management Options  Left sided chest pain: new and requires workup     Amount and/or Complexity of Data Reviewed  Clinical lab tests: ordered and reviewed  Tests in the radiology section of CPT®: ordered and reviewed  Obtain history from someone other than the patient: yes (Spouse, urgent care provider, med rec)  Review and summarize past medical records: yes  Discuss the patient with other providers: yes  Independent visualization of images, tracings, or specimens: yes    Risk of Complications, Morbidity, and/or Mortality  Presenting problems: high  Diagnostic procedures: high  Management options: high    Patient Progress  Patient progress: stable    CritCare Time    Disposition  Final diagnoses:   Left sided chest pain     Time reflects when diagnosis was documented in both MDM as applicable and the Disposition within this note     Time User Action Codes Description Comment    12/6/2018  4:04 PM Kirby Person Add [R07 9] Left sided chest pain       ED Disposition     ED Disposition Condition Comment    Discharge  New Huang discharge to home/self care  Condition at discharge: Good        Follow-up Information     Follow up With Specialties Details Why [facility],  Internal Medicine Schedule an appointment as soon as possible for a visit PCP followup [address] [id number]      Paulo Darling MD Internal Medicine Schedule an appointment as soon as possible for a visit cardiology followup [address] [place] [address]  [phone]            Discharge Medication List as of 12/6/2018  4:07 PM      CONTINUE these medications which have NOT CHANGED    Details   aspirin 81 MG tablet Take 81 mg by mouth daily  , Until Discontinued, Historical Med      escitalopram (LEXAPRO) 10 mg tablet Starting Mon 12/3/2018, Historical Med      Garlic Oil 9775 MG CAPS Take 2 capsules by mouth daily, Historical Med nitroglycerin (NITROSTAT) 0 4 mg SL tablet Place 1 tablet (0 4 mg total) under the tongue every 5 (five) minutes as needed for chest pain, Starting Thu 11/1/2018, Normal      rosuvastatin (CRESTOR) 20 MG tablet TAKE 1 TABLET DAILY, Normal             Outpatient Discharge Orders  Echo stress test w contrast if indicated   Standing Status: Future  Standing Exp   Date: 12/06/22         ED Provider  Electronically Signed by           José Manuel Gunn PA-C  12/06/18 0933

## 2018-12-07 LAB
ATRIAL RATE: 52 BPM
ATRIAL RATE: 54 BPM
ATRIAL RATE: 57 BPM
P AXIS: -14 DEGREES
P AXIS: -16 DEGREES
P AXIS: -8 DEGREES
PR INTERVAL: 150 MS
PR INTERVAL: 158 MS
PR INTERVAL: 168 MS
QRS AXIS: -6 DEGREES
QRS AXIS: 17 DEGREES
QRS AXIS: 7 DEGREES
QRSD INTERVAL: 68 MS
QRSD INTERVAL: 76 MS
QRSD INTERVAL: 86 MS
QT INTERVAL: 418 MS
QT INTERVAL: 432 MS
QT INTERVAL: 452 MS
QTC INTERVAL: 406 MS
QTC INTERVAL: 409 MS
QTC INTERVAL: 420 MS
T WAVE AXIS: 17 DEGREES
T WAVE AXIS: 17 DEGREES
T WAVE AXIS: 46 DEGREES
VENTRICULAR RATE: 52 BPM
VENTRICULAR RATE: 54 BPM
VENTRICULAR RATE: 57 BPM

## 2018-12-07 PROCEDURE — 93010 ELECTROCARDIOGRAM REPORT: CPT | Performed by: INTERNAL MEDICINE

## 2019-03-05 ENCOUNTER — APPOINTMENT (OUTPATIENT)
Dept: LAB | Facility: CLINIC | Age: 64
End: 2019-03-05
Payer: COMMERCIAL

## 2019-03-05 ENCOUNTER — OFFICE VISIT (OUTPATIENT)
Dept: INTERNAL MEDICINE CLINIC | Facility: CLINIC | Age: 64
End: 2019-03-05
Payer: COMMERCIAL

## 2019-03-05 VITALS
SYSTOLIC BLOOD PRESSURE: 150 MMHG | HEART RATE: 61 BPM | DIASTOLIC BLOOD PRESSURE: 92 MMHG | BODY MASS INDEX: 39.18 KG/M2 | HEIGHT: 69 IN | WEIGHT: 264.5 LBS | OXYGEN SATURATION: 100 % | TEMPERATURE: 98 F

## 2019-03-05 DIAGNOSIS — I10 BENIGN ESSENTIAL HYPERTENSION: ICD-10-CM

## 2019-03-05 DIAGNOSIS — E78.2 MIXED HYPERLIPIDEMIA: ICD-10-CM

## 2019-03-05 DIAGNOSIS — I25.10 ATHEROSCLEROSIS OF NATIVE CORONARY ARTERY OF NATIVE HEART WITHOUT ANGINA PECTORIS: ICD-10-CM

## 2019-03-05 DIAGNOSIS — E55.9 VITAMIN D DEFICIENCY: ICD-10-CM

## 2019-03-05 DIAGNOSIS — Z11.59 NEED FOR HEPATITIS C SCREENING TEST: ICD-10-CM

## 2019-03-05 DIAGNOSIS — E66.01 SEVERE OBESITY (BMI 35.0-39.9) WITH COMORBIDITY (HCC): ICD-10-CM

## 2019-03-05 DIAGNOSIS — M47.817 LUMBOSACRAL SPONDYLOSIS WITHOUT MYELOPATHY: ICD-10-CM

## 2019-03-05 DIAGNOSIS — Z95.5 S/P CORONARY ARTERY STENT PLACEMENT: ICD-10-CM

## 2019-03-05 DIAGNOSIS — G56.01 CARPAL TUNNEL SYNDROME, RIGHT: ICD-10-CM

## 2019-03-05 DIAGNOSIS — Z12.11 SCREENING FOR COLON CANCER: Primary | ICD-10-CM

## 2019-03-05 PROBLEM — H44.609: Status: ACTIVE | Noted: 2019-03-05

## 2019-03-05 PROBLEM — M41.9 ACQUIRED SCOLIOSIS: Status: ACTIVE | Noted: 2019-03-05

## 2019-03-05 PROBLEM — M19.039 LOCALIZED PRIMARY OSTEOARTHRITIS OF WRIST: Status: ACTIVE | Noted: 2017-02-01

## 2019-03-05 PROBLEM — E78.00 PURE HYPERCHOLESTEROLEMIA: Status: ACTIVE | Noted: 2018-09-04

## 2019-03-05 PROBLEM — E66.3 OVERWEIGHT: Status: ACTIVE | Noted: 2018-09-04

## 2019-03-05 PROBLEM — I65.29 CAROTID ARTERY OCCLUSION: Status: ACTIVE | Noted: 2019-03-05

## 2019-03-05 PROBLEM — M50.00 INTERVERTEBRAL DISC DISORDER OF CERVICAL REGION WITH MYELOPATHY: Status: ACTIVE | Noted: 2019-03-05

## 2019-03-05 PROBLEM — S29.019A THORACIC MYOFASCIAL STRAIN: Status: RESOLVED | Noted: 2018-03-28 | Resolved: 2019-03-05

## 2019-03-05 LAB
25(OH)D3 SERPL-MCNC: 24.2 NG/ML (ref 30–100)
ALBUMIN SERPL BCP-MCNC: 4.1 G/DL (ref 3.5–5)
ALP SERPL-CCNC: 48 U/L (ref 46–116)
ALT SERPL W P-5'-P-CCNC: 34 U/L (ref 12–78)
ANION GAP SERPL CALCULATED.3IONS-SCNC: 6 MMOL/L (ref 4–13)
AST SERPL W P-5'-P-CCNC: 30 U/L (ref 5–45)
BASOPHILS # BLD AUTO: 0.03 THOUSANDS/ΜL (ref 0–0.1)
BASOPHILS NFR BLD AUTO: 1 % (ref 0–1)
BILIRUB SERPL-MCNC: 0.82 MG/DL (ref 0.2–1)
BUN SERPL-MCNC: 16 MG/DL (ref 5–25)
CALCIUM SERPL-MCNC: 8.9 MG/DL (ref 8.3–10.1)
CHLORIDE SERPL-SCNC: 105 MMOL/L (ref 100–108)
CHOLEST SERPL-MCNC: 189 MG/DL (ref 50–200)
CO2 SERPL-SCNC: 27 MMOL/L (ref 21–32)
CREAT SERPL-MCNC: 1.14 MG/DL (ref 0.6–1.3)
EOSINOPHIL # BLD AUTO: 0.24 THOUSAND/ΜL (ref 0–0.61)
EOSINOPHIL NFR BLD AUTO: 4 % (ref 0–6)
ERYTHROCYTE [DISTWIDTH] IN BLOOD BY AUTOMATED COUNT: 13.2 % (ref 11.6–15.1)
GFR SERPL CREATININE-BSD FRML MDRD: 68 ML/MIN/1.73SQ M
GLUCOSE P FAST SERPL-MCNC: 99 MG/DL (ref 65–99)
HCT VFR BLD AUTO: 52 % (ref 36.5–49.3)
HDLC SERPL-MCNC: 48 MG/DL (ref 40–60)
HGB BLD-MCNC: 17.6 G/DL (ref 12–17)
IMM GRANULOCYTES # BLD AUTO: 0.01 THOUSAND/UL (ref 0–0.2)
IMM GRANULOCYTES NFR BLD AUTO: 0 % (ref 0–2)
LDLC SERPL CALC-MCNC: 106 MG/DL (ref 0–100)
LYMPHOCYTES # BLD AUTO: 2.27 THOUSANDS/ΜL (ref 0.6–4.47)
LYMPHOCYTES NFR BLD AUTO: 37 % (ref 14–44)
MCH RBC QN AUTO: 30.2 PG (ref 26.8–34.3)
MCHC RBC AUTO-ENTMCNC: 33.8 G/DL (ref 31.4–37.4)
MCV RBC AUTO: 89 FL (ref 82–98)
MONOCYTES # BLD AUTO: 0.54 THOUSAND/ΜL (ref 0.17–1.22)
MONOCYTES NFR BLD AUTO: 9 % (ref 4–12)
NEUTROPHILS # BLD AUTO: 3.12 THOUSANDS/ΜL (ref 1.85–7.62)
NEUTS SEG NFR BLD AUTO: 49 % (ref 43–75)
NONHDLC SERPL-MCNC: 141 MG/DL
NRBC BLD AUTO-RTO: 0 /100 WBCS
PLATELET # BLD AUTO: 221 THOUSANDS/UL (ref 149–390)
PMV BLD AUTO: 11.6 FL (ref 8.9–12.7)
POTASSIUM SERPL-SCNC: 4.2 MMOL/L (ref 3.5–5.3)
PROT SERPL-MCNC: 7.5 G/DL (ref 6.4–8.2)
RBC # BLD AUTO: 5.83 MILLION/UL (ref 3.88–5.62)
SODIUM SERPL-SCNC: 138 MMOL/L (ref 136–145)
TRIGL SERPL-MCNC: 175 MG/DL
TSH SERPL DL<=0.05 MIU/L-ACNC: 3.58 UIU/ML (ref 0.36–3.74)
WBC # BLD AUTO: 6.21 THOUSAND/UL (ref 4.31–10.16)

## 2019-03-05 PROCEDURE — 1036F TOBACCO NON-USER: CPT | Performed by: NURSE PRACTITIONER

## 2019-03-05 PROCEDURE — 84443 ASSAY THYROID STIM HORMONE: CPT

## 2019-03-05 PROCEDURE — 82306 VITAMIN D 25 HYDROXY: CPT

## 2019-03-05 PROCEDURE — 80053 COMPREHEN METABOLIC PANEL: CPT

## 2019-03-05 PROCEDURE — 99214 OFFICE O/P EST MOD 30 MIN: CPT | Performed by: NURSE PRACTITIONER

## 2019-03-05 PROCEDURE — 3008F BODY MASS INDEX DOCD: CPT | Performed by: NURSE PRACTITIONER

## 2019-03-05 PROCEDURE — 85025 COMPLETE CBC W/AUTO DIFF WBC: CPT

## 2019-03-05 PROCEDURE — 36415 COLL VENOUS BLD VENIPUNCTURE: CPT

## 2019-03-05 PROCEDURE — 86803 HEPATITIS C AB TEST: CPT

## 2019-03-05 PROCEDURE — 80061 LIPID PANEL: CPT

## 2019-03-05 RX ORDER — ROSUVASTATIN CALCIUM 20 MG/1
20 TABLET, COATED ORAL DAILY
Qty: 90 TABLET | Refills: 3 | Status: SHIPPED | OUTPATIENT
Start: 2019-03-05 | End: 2020-02-21 | Stop reason: SDUPTHER

## 2019-03-05 RX ORDER — LANSOPRAZOLE 15 MG/1
15 CAPSULE, DELAYED RELEASE ORAL DAILY
COMMUNITY
End: 2019-05-28

## 2019-03-05 NOTE — PROGRESS NOTES
Assessment/Plan: Will repeat fasting labs today  Patient is following up with Cardiology on Thursday and did call there office about his ongoing symptoms  Did send over the ECHO stress test for him to have done with them  BP today was up at 150/92  Recheck 140/99  He did provide a list from home of his Bps and they are within normal range and he did have some low readings of 90/60  Did give referral for GI for routine colonoscopy and did give referral for his carpal tunnel in the right hand  He will make these appointments  He was advised if any chest pain, palpitations, or SOB to go to ER  Will follow up in 3 months or sooner if need be  If any issues or concerns please call the office  No problem-specific Assessment & Plan notes found for this encounter           Problem List Items Addressed This Visit        Cardiovascular and Mediastinum    Atherosclerotic heart disease of native coronary artery without angina pectoris    Relevant Orders    Comprehensive metabolic panel    CBC and differential    TSH, 3rd generation with Free T4 reflex    Benign essential hypertension    Relevant Orders    Comprehensive metabolic panel    CBC and differential    TSH, 3rd generation with Free T4 reflex       Musculoskeletal and Integument    Lumbosacral spondylosis without myelopathy    Relevant Orders    Comprehensive metabolic panel    CBC and differential    TSH, 3rd generation with Free T4 reflex       Other    Hyperlipidemia    Relevant Medications    rosuvastatin (CRESTOR) 20 MG tablet    Other Relevant Orders    Comprehensive metabolic panel    CBC and differential    TSH, 3rd generation with Free T4 reflex    Lipid panel    Vitamin D deficiency    Relevant Orders    Comprehensive metabolic panel    CBC and differential    TSH, 3rd generation with Free T4 reflex    Vitamin D 25 hydroxy    S/P coronary artery stent placement    Relevant Orders    Comprehensive metabolic panel    CBC and differential    TSH, 3rd generation with Free T4 reflex      Other Visit Diagnoses     Screening for colon cancer    -  Primary    Relevant Orders    Ambulatory referral to Gastroenterology    Carpal tunnel syndrome, right        Relevant Orders    Ambulatory referral to Hand Surgery    Severe obesity (BMI 35 0-39  9) with comorbidity ([address] )        Need for hepatitis C screening test        Relevant Orders    Hepatitis C antibody            Subjective:      Patient ID: Lv Fox is a 58 y o  male  Constanza Fitch is here today to establish care as a new patient  He was a previous patient of Dr Zo Soni  He states back in 2010 he did have an MI with 3 stents placed  He is following up with Cardiology Dr Joie lAlen in [place]  He does have an appointment again on Thursday  He states for months he is having left sided chest discomfort and that radiates around to his back  He states he is not having any arm pain or jaw pain  He did think it was GERD and is taking Prevacid but states that is not helping  He is also having right hand numbness and tingling and states his hand is always very cold  He was diagnosed several years ago with carpal tunnel but has never followed back up with this  He states he is not having any neck pain or numbness or tingling going down his arm  He also does have muscle spasms and does have to have a steroid injection periodically for this  He denies any chest pain, palpitations, or SOB  He denies any drug, tobacco, or alcohol use  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  He has not yet done a FIT test and will need a colonoscopy  He will be due for lab work  He does follow up with [facility] and is having issues with his left eye having a floater in the central portion  He is following up with them in the next couple weeks  He does take Lexapro but is not taking it right now but usually does in the Summer  He will need refills on his Crestor    He offers no other issues  The following portions of the patient's history were reviewed and updated as appropriate:   He  has a past medical history of Cardiac disease, Hypertension, and Myocardial infarction ([facility] [address] )  He   Patient Active Problem List    Diagnosis Date Noted    Acquired scoliosis 03/05/2019    Carotid artery occlusion 03/05/2019    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 03/05/2019    Lumbosacral spondylosis without myelopathy 03/05/2019    Old intraocular magnetic foreign body 03/05/2019    Overweight 09/04/2018    Pure hypercholesterolemia 09/04/2018    S/P coronary artery stent placement 09/04/2018    Joint inflammation 05/30/2017    Localized primary osteoarthritis of wrist 02/01/2017    Vitamin D deficiency 03/06/2015    Anxiety 05/21/2013    Atherosclerotic heart disease of native coronary artery without angina pectoris 11/29/2012    Hyperlipidemia 11/29/2012    Coronary atherosclerosis 11/17/2010    Benign essential hypertension 11/17/2010    Osteoarthritis of hip 56/43/0858    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[facility] [address] ) 11/11/2008     He  has a past surgical history that includes Coronary stent placement; Total hip arthroplasty; Hernia repair; Other surgical history (09/18/2010); and Tonsillectomy and adenoidectomy  His family history includes Atrial fibrillation in his father; Breast cancer in his mother; Coronary artery disease in his mother; Diabetes in his mother; Hyperlipidemia in his mother  He  reports that he has never smoked  He has never used smokeless tobacco  He reports that he does not drink alcohol or use drugs  Current Outpatient Medications   Medication Sig Dispense Refill    aspirin 81 MG tablet Take 81 mg by mouth daily        Garlic Oil 2837 MG CAPS Take 2 capsules by mouth daily      lansoprazole (PREVACID) 15 mg capsule Take 15 mg by mouth daily      nitroglycerin (NITROSTAT) 0 4 mg SL tablet Place 1 tablet (0 4 mg total) under the tongue every 5 (five) minutes as needed for chest pain 25 tablet 3    rosuvastatin (CRESTOR) 20 MG tablet Take 1 tablet (20 mg total) by mouth daily 90 tablet 3    escitalopram (LEXAPRO) 10 mg tablet Take 1 tablet (10 mg total) by mouth daily for 90 days (Patient not taking: Reported on 3/5/2019) 90 tablet 3     No current facility-administered medications for this visit  Current Outpatient Medications on File Prior to Visit   Medication Sig    aspirin 81 MG tablet Take 81 mg by mouth daily   Garlic Oil 7388 MG CAPS Take 2 capsules by mouth daily    lansoprazole (PREVACID) 15 mg capsule Take 15 mg by mouth daily    nitroglycerin (NITROSTAT) 0 4 mg SL tablet Place 1 tablet (0 4 mg total) under the tongue every 5 (five) minutes as needed for chest pain    [DISCONTINUED] rosuvastatin (CRESTOR) 20 MG tablet TAKE 1 TABLET DAILY    escitalopram (LEXAPRO) 10 mg tablet Take 1 tablet (10 mg total) by mouth daily for 90 days (Patient not taking: Reported on 3/5/2019)    [DISCONTINUED] escitalopram (LEXAPRO) 10 mg tablet      No current facility-administered medications on file prior to visit  He is allergic to erythromycin base; other; oxycodone-acetaminophen; percocet [oxycodone-acetaminophen]; and penicillins       Review of Systems   Constitutional: Negative  HENT: Negative  Eyes: Negative  Respiratory: Negative  Cardiovascular:        Chest discomfort   Gastrointestinal: Negative  Endocrine: Negative  Genitourinary: Negative  Musculoskeletal:        Numbness in right hand   Skin: Negative  Allergic/Immunologic: Negative  Neurological: Negative  Hematological: Negative  Psychiatric/Behavioral: Negative            Objective:      /92 (BP Location: Right arm, Patient Position: Sitting, Cuff Size: Large)   Pulse 61   Temp 98 °F (36 7 °C) (Temporal)   Ht 5' 9" (1 753 m)   Wt 120 kg (264 lb 8 oz)   SpO2 100%   BMI 39 06 kg/m²          Physical Exam Constitutional: He is oriented to person, place, and time  He appears well-developed and well-nourished  HENT:   Head: Normocephalic and atraumatic  Right Ear: External ear normal    Left Ear: External ear normal    Nose: Nose normal    Mouth/Throat: Oropharynx is clear and moist    Eyes: Pupils are equal, round, and reactive to light  Conjunctivae and EOM are normal    Neck: Normal range of motion  Neck supple  Cardiovascular: Normal rate, regular rhythm, normal heart sounds and intact distal pulses  Pulmonary/Chest: Effort normal and breath sounds normal    Abdominal: Soft  Bowel sounds are normal    Musculoskeletal: Normal range of motion  Neurological: He is alert and oriented to person, place, and time  Skin: Skin is warm and dry  Capillary refill takes less than 2 seconds  Psychiatric: He has a normal mood and affect  His behavior is normal  Judgment and thought content normal    Vitals reviewed  BMI Counseling: Body mass index is 39 06 kg/m²  Discussed the patient's BMI with him  The BMI is above average  BMI counseling and education was provided to the patient  Nutrition recommendations include reducing portion sizes, decreasing overall calorie intake, 3-5 servings of fruits/vegetables daily, reducing fast food intake, consuming healthier snacks, decreasing soda and/or juice intake, moderation in carbohydrate intake, increasing intake of lean protein, reducing intake of saturated fat and trans fat and reducing intake of cholesterol

## 2019-03-05 NOTE — PATIENT INSTRUCTIONS

## 2019-03-06 DIAGNOSIS — E55.9 VITAMIN D DEFICIENCY: Primary | ICD-10-CM

## 2019-03-06 LAB — HCV AB SER QL: NORMAL

## 2019-03-07 ENCOUNTER — TELEPHONE (OUTPATIENT)
Dept: GASTROENTEROLOGY | Facility: AMBULARY SURGERY CENTER | Age: 64
End: 2019-03-07

## 2019-03-07 ENCOUNTER — TELEPHONE (OUTPATIENT)
Dept: GASTROENTEROLOGY | Facility: CLINIC | Age: 64
End: 2019-03-07

## 2019-03-07 DIAGNOSIS — Z12.11 COLON CANCER SCREENING: Primary | ICD-10-CM

## 2019-03-07 NOTE — TELEPHONE ENCOUNTER
03/07/19  Screened by: Shari Ortiz    Referring Provider     Pre- Screening: There is no height or weight on file to calculate BMI  Has patient been referred for a routine screening Colonoscopy? no  Is the patient between 39-70 years old? no    SCHEDULING STAFF   If the patient is between 45yrs-49yrs, please advise patient to confirm benefits/coverage with their insurance company for a routine screening colonoscopy, some insurance carriers will only cover at [facility] or older   If the patient is over 66years old, please schedule an office visit  Do you have any of the following symptoms? NO      Have you had a coronary or vascular stent within the last year? no    Have you had a heart attack or stroke in the last 6 months? no    Have you had intestinal surgery in the last 3 months? no    Do you have problems with: Sleep Apnea NO    Do you use: NO    Have you been hospitalized in the last Month? no    Have you been diagnosed with a bleeding disorder or anemia? no    Have you had chest pain (angina) or breathing problems  (COPD) in the last 3 months? no    Do you have any difficulty walking up a flight of stairs? no    Have you had Kidney failure or insufficiency? no    Have you had heart valve surgery? no    Are you confined to a wheelchair? no    Do you take Other blood thinning medications no    Have you been diagnosed with Diabetes or are you taking any   Diabetic medications? no    : If patient answers NO to medical questions, then schedule procedure  If patient answers YES to medical questions, then schedule office appointment  Previous Colonoscopy no  Date and Facility of last colonoscopy?      Comments: PASSED OA [facility] LOCATION

## 2019-03-07 NOTE — TELEPHONE ENCOUNTER
Liam scheduled 3/27/19 with Shea Olson at [facility]  Golytely instructions given over the phone and mailed to home address   Golytely sent to pharmacy

## 2019-03-11 ENCOUNTER — TELEPHONE (OUTPATIENT)
Dept: INTERNAL MEDICINE CLINIC | Facility: CLINIC | Age: 64
End: 2019-03-11

## 2019-03-11 NOTE — TELEPHONE ENCOUNTER
Karen Bell and he is scheduled for a stress test on 4/8/19, and a colonoscopy on 3/27/19  He also stated that the cardiologist did up his crestor to 40mg

## 2019-03-18 NOTE — TELEPHONE ENCOUNTER
Emailed pt miralax/dulcolax instructions to [email or web address] and advised pt to disregard the golytely

## 2019-03-22 DIAGNOSIS — Z12.11 SCREENING FOR COLON CANCER: Primary | ICD-10-CM

## 2019-03-27 ENCOUNTER — HOSPITAL ENCOUNTER (OUTPATIENT)
Facility: HOSPITAL | Age: 64
Setting detail: OUTPATIENT SURGERY
Discharge: HOME | End: 2019-03-27
Attending: INTERNAL MEDICINE | Admitting: INTERNAL MEDICINE
Payer: COMMERCIAL

## 2019-03-27 ENCOUNTER — ANESTHESIA EVENT (OUTPATIENT)
Dept: PERIOP | Facility: HOSPITAL | Age: 64
End: 2019-03-27
Payer: COMMERCIAL

## 2019-03-27 ENCOUNTER — ANESTHESIA (OUTPATIENT)
Dept: PERIOP | Facility: HOSPITAL | Age: 64
End: 2019-03-27
Payer: COMMERCIAL

## 2019-03-27 VITALS
OXYGEN SATURATION: 99 % | HEART RATE: 62 BPM | WEIGHT: 264 LBS | TEMPERATURE: 98.6 F | RESPIRATION RATE: 18 BRPM | HEIGHT: 69 IN | SYSTOLIC BLOOD PRESSURE: 129 MMHG | DIASTOLIC BLOOD PRESSURE: 81 MMHG | BODY MASS INDEX: 39.1 KG/M2

## 2019-03-27 PROCEDURE — G0121 COLON CA SCRN NOT HI RSK IND: HCPCS | Performed by: INTERNAL MEDICINE

## 2019-03-27 RX ORDER — ONDANSETRON 2 MG/ML
4 INJECTION INTRAMUSCULAR; INTRAVENOUS ONCE AS NEEDED
Status: DISCONTINUED | OUTPATIENT
Start: 2019-03-27 | End: 2019-03-27 | Stop reason: HOSPADM

## 2019-03-27 RX ORDER — SODIUM CHLORIDE, SODIUM LACTATE, POTASSIUM CHLORIDE, CALCIUM CHLORIDE 600; 310; 30; 20 MG/100ML; MG/100ML; MG/100ML; MG/100ML
INJECTION, SOLUTION INTRAVENOUS CONTINUOUS PRN
Status: DISCONTINUED | OUTPATIENT
Start: 2019-03-27 | End: 2019-03-27 | Stop reason: SURG

## 2019-03-27 RX ORDER — LIDOCAINE HYDROCHLORIDE 10 MG/ML
INJECTION, SOLUTION EPIDURAL; INFILTRATION; INTRACAUDAL; PERINEURAL AS NEEDED
Status: DISCONTINUED | OUTPATIENT
Start: 2019-03-27 | End: 2019-03-27 | Stop reason: SURG

## 2019-03-27 RX ORDER — PROPOFOL 10 MG/ML
INJECTION, EMULSION INTRAVENOUS AS NEEDED
Status: DISCONTINUED | OUTPATIENT
Start: 2019-03-27 | End: 2019-03-27 | Stop reason: SURG

## 2019-03-27 RX ADMIN — PROPOFOL 20 MG: 10 INJECTION, EMULSION INTRAVENOUS at 09:33

## 2019-03-27 RX ADMIN — PROPOFOL 20 MG: 10 INJECTION, EMULSION INTRAVENOUS at 09:36

## 2019-03-27 RX ADMIN — PROPOFOL 20 MG: 10 INJECTION, EMULSION INTRAVENOUS at 09:40

## 2019-03-27 RX ADMIN — PROPOFOL 20 MG: 10 INJECTION, EMULSION INTRAVENOUS at 09:35

## 2019-03-27 RX ADMIN — PROPOFOL 100 MG: 10 INJECTION, EMULSION INTRAVENOUS at 09:31

## 2019-03-27 RX ADMIN — SODIUM CHLORIDE, SODIUM LACTATE, POTASSIUM CHLORIDE, AND CALCIUM CHLORIDE: .6; .31; .03; .02 INJECTION, SOLUTION INTRAVENOUS at 09:25

## 2019-03-27 RX ADMIN — PROPOFOL 20 MG: 10 INJECTION, EMULSION INTRAVENOUS at 09:32

## 2019-03-27 RX ADMIN — PROPOFOL 20 MG: 10 INJECTION, EMULSION INTRAVENOUS at 09:38

## 2019-03-27 RX ADMIN — PROPOFOL 20 MG: 10 INJECTION, EMULSION INTRAVENOUS at 09:34

## 2019-03-27 RX ADMIN — LIDOCAINE HYDROCHLORIDE 50 MG: 10 INJECTION, SOLUTION EPIDURAL; INFILTRATION; INTRACAUDAL; PERINEURAL at 09:31

## 2019-03-27 NOTE — ANESTHESIA POSTPROCEDURE EVALUATION
Post-Op Assessment Note    CV Status:  Stable  Pain Score: 0    Pain management: adequate     Mental Status:  Alert and awake   Hydration Status:  Euvolemic and stable   PONV Controlled:  Controlled   Airway Patency:  Patent   Post Op Vitals Reviewed: Yes      Staff: Anesthesiologist           BP   118/60   Temp   98 7   Pulse  70   Resp   16   SpO2   100

## 2019-03-27 NOTE — ANESTHESIA PREPROCEDURE EVALUATION
Review of Systems/Medical History  Patient summary reviewed    No history of anesthetic complications     Cardiovascular  Exercise tolerance (METS): >4,  Hyperlipidemia, Hypertension controlled, Past MI > 6 months, CAD , Cardiac stents > 1 year    Pulmonary  Negative pulmonary ROS        GI/Hepatic    Bowel prep       Negative  ROS        Endo/Other    Obesity    GYN       Hematology   Musculoskeletal    Arthritis     Neurology  Negative neurology ROS      Psychology   Anxiety,              Physical Exam    Airway    Mallampati score: II  TM Distance: >3 FB  Neck ROM: full     Dental   Comment: No loose,     Cardiovascular  Rhythm: regular, Rate: normal,     Pulmonary  Breath sounds clear to auscultation,     Other Findings        Anesthesia Plan  ASA Score- 2     Anesthesia Type- IV sedation with anesthesia with ASA Monitors  Additional Monitors:   Airway Plan:         Plan Factors-  Patient did not smoke on day of surgery  Induction- intravenous  Postoperative Plan-     Informed Consent- Anesthetic plan and risks discussed with patient

## 2019-05-28 ENCOUNTER — APPOINTMENT (OUTPATIENT)
Dept: LAB | Facility: CLINIC | Age: 64
End: 2019-05-28
Payer: MEDICARE

## 2019-05-28 ENCOUNTER — OFFICE VISIT (OUTPATIENT)
Dept: INTERNAL MEDICINE CLINIC | Facility: CLINIC | Age: 64
End: 2019-05-28
Payer: MEDICARE

## 2019-05-28 VITALS
TEMPERATURE: 97.4 F | HEIGHT: 69 IN | BODY MASS INDEX: 38.06 KG/M2 | HEART RATE: 60 BPM | SYSTOLIC BLOOD PRESSURE: 130 MMHG | WEIGHT: 257 LBS | DIASTOLIC BLOOD PRESSURE: 80 MMHG | OXYGEN SATURATION: 98 %

## 2019-05-28 DIAGNOSIS — E55.9 VITAMIN D DEFICIENCY: ICD-10-CM

## 2019-05-28 DIAGNOSIS — I25.10 ATHEROSCLEROSIS OF NATIVE CORONARY ARTERY OF NATIVE HEART WITHOUT ANGINA PECTORIS: ICD-10-CM

## 2019-05-28 DIAGNOSIS — M47.817 LUMBOSACRAL SPONDYLOSIS WITHOUT MYELOPATHY: ICD-10-CM

## 2019-05-28 DIAGNOSIS — E78.2 MIXED HYPERLIPIDEMIA: ICD-10-CM

## 2019-05-28 DIAGNOSIS — I10 BENIGN ESSENTIAL HYPERTENSION: Primary | ICD-10-CM

## 2019-05-28 DIAGNOSIS — Z95.5 S/P CORONARY ARTERY STENT PLACEMENT: ICD-10-CM

## 2019-05-28 DIAGNOSIS — F41.9 ANXIETY: ICD-10-CM

## 2019-05-28 DIAGNOSIS — I25.119 ATHEROSCLEROSIS OF CORONARY ARTERY WITH ANGINA PECTORIS, UNSPECIFIED VESSEL OR LESION TYPE, UNSPECIFIED WHETHER NATIVE OR TRANSPLANTED HEART (HCC): ICD-10-CM

## 2019-05-28 LAB — 25(OH)D3 SERPL-MCNC: 29.3 NG/ML (ref 30–100)

## 2019-05-28 PROCEDURE — 36415 COLL VENOUS BLD VENIPUNCTURE: CPT

## 2019-05-28 PROCEDURE — 99214 OFFICE O/P EST MOD 30 MIN: CPT | Performed by: NURSE PRACTITIONER

## 2019-05-28 PROCEDURE — 82306 VITAMIN D 25 HYDROXY: CPT

## 2019-05-29 DIAGNOSIS — E55.9 VITAMIN D DEFICIENCY: ICD-10-CM

## 2019-09-13 ENCOUNTER — APPOINTMENT (OUTPATIENT)
Dept: LAB | Facility: MEDICAL CENTER | Age: 64
End: 2019-09-13
Payer: MEDICARE

## 2019-09-13 DIAGNOSIS — F41.9 ANXIETY: ICD-10-CM

## 2019-09-13 DIAGNOSIS — I25.119 ATHEROSCLEROSIS OF CORONARY ARTERY WITH ANGINA PECTORIS, UNSPECIFIED VESSEL OR LESION TYPE, UNSPECIFIED WHETHER NATIVE OR TRANSPLANTED HEART (HCC): ICD-10-CM

## 2019-09-13 DIAGNOSIS — M47.817 LUMBOSACRAL SPONDYLOSIS WITHOUT MYELOPATHY: ICD-10-CM

## 2019-09-13 DIAGNOSIS — I25.10 ATHEROSCLEROSIS OF NATIVE CORONARY ARTERY OF NATIVE HEART WITHOUT ANGINA PECTORIS: ICD-10-CM

## 2019-09-13 DIAGNOSIS — E55.9 VITAMIN D DEFICIENCY: ICD-10-CM

## 2019-09-13 DIAGNOSIS — Z95.5 S/P CORONARY ARTERY STENT PLACEMENT: ICD-10-CM

## 2019-09-13 DIAGNOSIS — E78.2 MIXED HYPERLIPIDEMIA: ICD-10-CM

## 2019-09-13 DIAGNOSIS — I10 BENIGN ESSENTIAL HYPERTENSION: ICD-10-CM

## 2019-09-13 LAB
ALBUMIN SERPL BCP-MCNC: 3.8 G/DL (ref 3.5–5)
ALP SERPL-CCNC: 44 U/L (ref 46–116)
ALT SERPL W P-5'-P-CCNC: 28 U/L (ref 12–78)
ANION GAP SERPL CALCULATED.3IONS-SCNC: 2 MMOL/L (ref 4–13)
AST SERPL W P-5'-P-CCNC: 31 U/L (ref 5–45)
BASOPHILS # BLD AUTO: 0.04 THOUSANDS/ΜL (ref 0–0.1)
BASOPHILS NFR BLD AUTO: 1 % (ref 0–1)
BILIRUB SERPL-MCNC: 0.93 MG/DL (ref 0.2–1)
BUN SERPL-MCNC: 24 MG/DL (ref 5–25)
CALCIUM SERPL-MCNC: 9 MG/DL (ref 8.3–10.1)
CHLORIDE SERPL-SCNC: 104 MMOL/L (ref 100–108)
CHOLEST SERPL-MCNC: 157 MG/DL (ref 50–200)
CO2 SERPL-SCNC: 28 MMOL/L (ref 21–32)
CREAT SERPL-MCNC: 1.27 MG/DL (ref 0.6–1.3)
EOSINOPHIL # BLD AUTO: 0.25 THOUSAND/ΜL (ref 0–0.61)
EOSINOPHIL NFR BLD AUTO: 4 % (ref 0–6)
ERYTHROCYTE [DISTWIDTH] IN BLOOD BY AUTOMATED COUNT: 13.1 % (ref 11.6–15.1)
GFR SERPL CREATININE-BSD FRML MDRD: 59 ML/MIN/1.73SQ M
GLUCOSE P FAST SERPL-MCNC: 101 MG/DL (ref 65–99)
HCT VFR BLD AUTO: 48.1 % (ref 36.5–49.3)
HDLC SERPL-MCNC: 43 MG/DL (ref 40–60)
HGB BLD-MCNC: 16.3 G/DL (ref 12–17)
IMM GRANULOCYTES # BLD AUTO: 0.02 THOUSAND/UL (ref 0–0.2)
IMM GRANULOCYTES NFR BLD AUTO: 0 % (ref 0–2)
LDLC SERPL CALC-MCNC: 91 MG/DL (ref 0–100)
LYMPHOCYTES # BLD AUTO: 2.44 THOUSANDS/ΜL (ref 0.6–4.47)
LYMPHOCYTES NFR BLD AUTO: 39 % (ref 14–44)
MCH RBC QN AUTO: 30.5 PG (ref 26.8–34.3)
MCHC RBC AUTO-ENTMCNC: 33.9 G/DL (ref 31.4–37.4)
MCV RBC AUTO: 90 FL (ref 82–98)
MONOCYTES # BLD AUTO: 0.55 THOUSAND/ΜL (ref 0.17–1.22)
MONOCYTES NFR BLD AUTO: 9 % (ref 4–12)
NEUTROPHILS # BLD AUTO: 3.02 THOUSANDS/ΜL (ref 1.85–7.62)
NEUTS SEG NFR BLD AUTO: 47 % (ref 43–75)
NONHDLC SERPL-MCNC: 114 MG/DL
NRBC BLD AUTO-RTO: 0 /100 WBCS
PLATELET # BLD AUTO: 221 THOUSANDS/UL (ref 149–390)
PMV BLD AUTO: 12.8 FL (ref 8.9–12.7)
POTASSIUM SERPL-SCNC: 4.5 MMOL/L (ref 3.5–5.3)
PROT SERPL-MCNC: 7 G/DL (ref 6.4–8.2)
RBC # BLD AUTO: 5.35 MILLION/UL (ref 3.88–5.62)
SODIUM SERPL-SCNC: 134 MMOL/L (ref 136–145)
TRIGL SERPL-MCNC: 117 MG/DL
TSH SERPL DL<=0.05 MIU/L-ACNC: 2.79 UIU/ML (ref 0.36–3.74)
WBC # BLD AUTO: 6.32 THOUSAND/UL (ref 4.31–10.16)

## 2019-09-13 PROCEDURE — 80053 COMPREHEN METABOLIC PANEL: CPT

## 2019-09-13 PROCEDURE — 85025 COMPLETE CBC W/AUTO DIFF WBC: CPT

## 2019-09-13 PROCEDURE — 80061 LIPID PANEL: CPT

## 2019-09-13 PROCEDURE — 84443 ASSAY THYROID STIM HORMONE: CPT

## 2019-09-13 PROCEDURE — 36415 COLL VENOUS BLD VENIPUNCTURE: CPT

## 2019-09-20 ENCOUNTER — OFFICE VISIT (OUTPATIENT)
Dept: INTERNAL MEDICINE CLINIC | Facility: CLINIC | Age: 64
End: 2019-09-20
Payer: MEDICARE

## 2019-09-20 VITALS
WEIGHT: 257.5 LBS | HEIGHT: 69 IN | SYSTOLIC BLOOD PRESSURE: 128 MMHG | HEART RATE: 65 BPM | RESPIRATION RATE: 18 BRPM | BODY MASS INDEX: 38.14 KG/M2 | DIASTOLIC BLOOD PRESSURE: 76 MMHG | TEMPERATURE: 97.6 F | OXYGEN SATURATION: 97 %

## 2019-09-20 DIAGNOSIS — Z13.29 SCREENING FOR THYROID DISORDER: ICD-10-CM

## 2019-09-20 DIAGNOSIS — E55.9 VITAMIN D DEFICIENCY: ICD-10-CM

## 2019-09-20 DIAGNOSIS — M25.531 RIGHT WRIST PAIN: ICD-10-CM

## 2019-09-20 DIAGNOSIS — E78.00 PURE HYPERCHOLESTEROLEMIA: ICD-10-CM

## 2019-09-20 DIAGNOSIS — R05.9 COUGH: ICD-10-CM

## 2019-09-20 DIAGNOSIS — Z00.00 MEDICARE ANNUAL WELLNESS VISIT, INITIAL: Primary | ICD-10-CM

## 2019-09-20 DIAGNOSIS — I26.99 IATROGENIC PULMONARY EMBOLISM AND INFARCTION, INITIAL ENCOUNTER (HCC): ICD-10-CM

## 2019-09-20 DIAGNOSIS — Z95.5 S/P CORONARY ARTERY STENT PLACEMENT: ICD-10-CM

## 2019-09-20 DIAGNOSIS — I25.10 ATHEROSCLEROSIS OF NATIVE CORONARY ARTERY OF NATIVE HEART WITHOUT ANGINA PECTORIS: ICD-10-CM

## 2019-09-20 DIAGNOSIS — I10 BENIGN ESSENTIAL HYPERTENSION: ICD-10-CM

## 2019-09-20 DIAGNOSIS — T81.718A IATROGENIC PULMONARY EMBOLISM AND INFARCTION, INITIAL ENCOUNTER (HCC): ICD-10-CM

## 2019-09-20 DIAGNOSIS — M47.817 LUMBOSACRAL SPONDYLOSIS WITHOUT MYELOPATHY: ICD-10-CM

## 2019-09-20 PROCEDURE — G0402 INITIAL PREVENTIVE EXAM: HCPCS | Performed by: NURSE PRACTITIONER

## 2019-09-20 PROCEDURE — 99214 OFFICE O/P EST MOD 30 MIN: CPT | Performed by: NURSE PRACTITIONER

## 2019-09-20 RX ORDER — ALBUTEROL SULFATE 90 UG/1
2 AEROSOL, METERED RESPIRATORY (INHALATION) EVERY 6 HOURS PRN
Qty: 1 INHALER | Refills: 5 | Status: SHIPPED | OUTPATIENT
Start: 2019-09-20 | End: 2022-02-02

## 2019-09-20 RX ORDER — CHOLECALCIFEROL (VITAMIN D3) 1250 MCG
1 CAPSULE ORAL WEEKLY
Qty: 12 CAPSULE | Refills: 1 | Status: SHIPPED | OUTPATIENT
Start: 2019-09-20 | End: 2020-04-02 | Stop reason: SDUPTHER

## 2019-09-20 RX ORDER — CETIRIZINE HYDROCHLORIDE 10 MG/1
10 TABLET ORAL DAILY
Qty: 30 TABLET | Refills: 3 | Status: SHIPPED | OUTPATIENT
Start: 2019-09-20 | End: 2020-12-29

## 2019-09-20 NOTE — PROGRESS NOTES
Assessment and Plan:     Problem List Items Addressed This Visit        Respiratory    Iatrogenic pulmonary embolism and infarction (HCC)    Relevant Medications    albuterol (VENTOLIN HFA) 90 mcg/act inhaler    cetirizine (ZyrTEC) 10 mg tablet    Other Relevant Orders    Comprehensive metabolic panel    CBC and differential       Cardiovascular and Mediastinum    Atherosclerotic heart disease of native coronary artery without angina pectoris    Relevant Orders    Comprehensive metabolic panel    CBC and differential    Benign essential hypertension    Relevant Orders    Comprehensive metabolic panel    CBC and differential       Musculoskeletal and Integument    Lumbosacral spondylosis without myelopathy    Relevant Orders    Comprehensive metabolic panel    CBC and differential       Other    Vitamin D deficiency    Relevant Orders    Comprehensive metabolic panel    CBC and differential    Vitamin D 25 hydroxy    Pure hypercholesterolemia    Relevant Orders    Comprehensive metabolic panel    CBC and differential    Lipid panel    S/P coronary artery stent placement    Relevant Orders    Comprehensive metabolic panel    CBC and differential    Right wrist pain    Relevant Orders    Ambulatory referral to Hand Surgery    XR wrist 3+ vw right    Comprehensive metabolic panel    CBC and differential    Cough    Relevant Medications    albuterol (VENTOLIN HFA) 90 mcg/act inhaler    cetirizine (ZyrTEC) 10 mg tablet    Other Relevant Orders    Comprehensive metabolic panel    CBC and differential    Medicare annual wellness visit, initial - Primary    Screening for thyroid disorder    Relevant Orders    TSH, 3rd generation with Free T4 reflex           Preventive health issues were discussed with patient, and age appropriate screening tests were ordered as noted in patient's After Visit Summary    Personalized health advice and appropriate referrals for health education or preventive services given if needed, as noted in patient's After Visit Summary       History of Present Illness:     Patient presents for Medicare Annual Wellness visit    Patient Care Team:  YUNI Toledo as PCP - General (Family Medicine)  MD Earle Brian MD Brent Service, DO  Alisia Griffith MD as Endoscopist     Problem List:     Patient Active Problem List   Diagnosis    Anxiety    Atherosclerotic heart disease of native coronary artery without angina pectoris    Coronary atherosclerosis    Benign essential hypertension    Hyperlipidemia    Joint inflammation    Vitamin D deficiency    Acquired scoliosis    Carotid artery occlusion    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    Localized primary osteoarthritis of wrist    Lumbosacral spondylosis without myelopathy    Old intraocular magnetic foreign body    Osteoarthritis of hip    Overweight    Pure hypercholesterolemia    S/P coronary artery stent placement    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[facility] )    Colon cancer screening    Right wrist pain    Iatrogenic pulmonary embolism and infarction ([facility]    Cough    Medicare annual wellness visit, subsequent    Medicare annual wellness visit, initial    Screening for thyroid disorder      Past Medical and Surgical History:     Past Medical History:   Diagnosis Date    Cardiac disease     Hypertension     Myocardial infarction [facility])      Past Surgical History:   Procedure Laterality Date    CORONARY STENT PLACEMENT      HERNIA REPAIR      OTHER SURGICAL HISTORY  09/18/2010    Arterial catheterization    ME COLONOSCOPY FLX DX W/COLLJ SPEC WHEN PFRMD N/A 3/27/2019    Procedure: COLONOSCOPY;  Surgeon: Alisia Griffith MD;  Location: [facility];  Service: Gastroenterology    TONSILLECTOMY AND ADENOIDECTOMY      TOTAL HIP ARTHROPLASTY        Family History:     Family History   Problem Relation Age of Onset    Breast cancer Mother     Coronary artery disease Mother  Diabetes Mother     Hyperlipidemia Mother     Atrial fibrillation Father       Social History:     Social History     Socioeconomic History    Marital status: /Civil Union     Spouse name: None    Number of children: None    Years of education: None    Highest education level: None   Occupational History    None   Social Needs    Financial resource strain: None    Food insecurity:     Worry: None     Inability: None    Transportation needs:     Medical: None     Non-medical: None   Tobacco Use    Smoking status: Never Smoker    Smokeless tobacco: Never Used   Substance and Sexual Activity    Alcohol use: No    Drug use: No    Sexual activity: None   Lifestyle    Physical activity:     Days per week: None     Minutes per session: None    Stress: None   Relationships    Social connections:     Talks on phone: None     Gets together: None     Attends Muslim service: None     Active member of club or organization: None     Attends meetings of clubs or organizations: None     Relationship status: None    Intimate partner violence:     Fear of current or ex partner: None     Emotionally abused: None     Physically abused: None     Forced sexual activity: None   Other Topics Concern    None   Social History Narrative    Dental care, regularly    Good dental hygiene    No caffeine use    Sun protection sunscreen       Medications and Allergies:     Current Outpatient Medications   Medication Sig Dispense Refill    aspirin 81 MG tablet Take 81 mg by mouth daily        Cholecalciferol (VITAMIN D3) 36859 units TABS Take one tablet by mouth once weekly 4 tablet 3    Garlic Oil 8733 MG CAPS Take 2 capsules by mouth daily      nitroglycerin (NITROSTAT) 0 4 mg SL tablet Place 1 tablet (0 4 mg total) under the tongue every 5 (five) minutes as needed for chest pain 25 tablet 3    rosuvastatin (CRESTOR) 20 MG tablet Take 1 tablet (20 mg total) by mouth daily 90 tablet 3    albuterol (VENTOLIN HFA) 90 mcg/act inhaler Inhale 2 puffs every 6 (six) hours as needed for wheezing 1 Inhaler 5    cetirizine (ZyrTEC) 10 mg tablet Take 1 tablet (10 mg total) by mouth daily 30 tablet 3     No current facility-administered medications for this visit  Allergies   Allergen Reactions    Erythromycin Base Diarrhea    Other Other (See Comments)     HIGH DOSE ASPIRINS    Nose bleeds    Oxycodone-Acetaminophen Other (See Comments)     hallucinations    Percocet [Oxycodone-Acetaminophen] Other (See Comments)     hallucinations    Penicillins Hives and Rash      Immunizations:     Immunization History   Administered Date(s) Administered    Tdap 05/06/2017      Health Maintenance:         Topic Date Due    CRC Screening: Colonoscopy  03/27/2029    Hepatitis C Screening  Completed         Topic Date Due    Pneumococcal Vaccine: 65+ Years (1 of 2 - PCV13) 04/26/2019      Medicare Health Risk Assessment:     /76 (BP Location: Left arm, Patient Position: Sitting, Cuff Size: Large)   Pulse 65   Temp 97 6 °F (36 4 °C) (Temporal)   Resp 18   Ht 5' 9" (1 753 m)   Wt 117 kg (257 lb 8 oz)   SpO2 97%   BMI 38 03 kg/m²      Denis Ace is here for his Initial Wellness visit  Health Risk Assessment:   Patient rates overall health as very good  Patient feels that their physical health rating is same  Eyesight was rated as slightly worse  Hearing was rated as same  Patient feels that their emotional and mental health rating is same  Pain experienced in the last 7 days has been some  Patient's pain rating has been 5/10  Patient states that he has experienced no weight loss or gain in last 6 months  Depression Screening:   PHQ-2 Score: 0      Fall Risk Screening: In the past year, patient has experienced: no history of falling in past year      Home Safety:  Patient does not have trouble with stairs inside or outside of their home  Patient has working smoke alarms and has no working carbon monoxide detector  Home safety hazards include: none  Nutrition:   Current diet is Regular  Medications:   Patient is currently taking over-the-counter supplements  OTC medications include: asprin 81mg  Patient is able to manage medications  Activities of Daily Living (ADLs)/Instrumental Activities of Daily Living (IADLs):   Walk and transfer into and out of bed and chair?: Yes  Dress and groom yourself?: Yes    Bathe or shower yourself?: Yes    Feed yourself? Yes  Do your laundry/housekeeping?: Yes  Manage your money, pay your bills and track your expenses?: Yes  Make your own meals?: Yes    Do your own shopping?: Yes    Durable Medical Equipment Suppliers  na    Previous Hospitalizations:   Any hospitalizations or ED visits within the last 12 months?: Yes    How many hospitalizations have you had in the last year?: 1-2    Advance Care Planning:   Living will: Yes    Durable POA for healthcare:  Yes    Advanced directive: Yes    Advanced directive counseling given: Yes    Five wishes given: Yes    Patient declined ACP directive: No    End of Life Decisions reviewed with patient: Yes    Provider agrees with end of life decisions: Yes      Cognitive Screening:   Provider or family/friend/caregiver concerned regarding cognition?: No    PREVENTIVE SCREENINGS      Cardiovascular Screening:    General: Screening Not Indicated and History Lipid Disorder      Diabetes Screening:     General: Screening Current      Colorectal Cancer Screening:     General: Screening Current      Prostate Cancer Screening:    General: Screening Current      Osteoporosis Screening:    General: Screening Not Indicated      Abdominal Aortic Aneurysm (AAA) Screening:    Risk factors include: age between 73-67 yo        Lung Cancer Screening:     General: Screening Not Indicated      Hepatitis C Screening:    General: Screening Current      YUNI Addison

## 2019-09-20 NOTE — PROGRESS NOTES
Assessment/Plan:Patient is following up with Cardiology and did have recent stress test done which was normal  He will make a follow up with Cardiology for the upcoming future  He is taking Crestor 20 mg and can not tolerate 40 mg  Last lab work did show LDL was 106 and triglycerides 117  Vitamin D still low at 29 5  He is deferring vaccines at this time  /76  Recent colonoscopy did show internal hemorrhoids with no polyps  Other screenings are up to date  Labs reviewed with patient and normal  Will give XR for his right wrist and will send to a Hand Specialist  He was advised to use caution with any NSAIDs while taking ASA  Will call in Zyrtec and Ventolin inhaler as needed for his cough  If his symptom are worsening of any issues he was advised to call the office  Will repeat labs again in 5 months  No problem-specific Assessment & Plan notes found for this encounter           Problem List Items Addressed This Visit        Respiratory    Iatrogenic pulmonary embolism and infarction (HCC)    Relevant Medications    albuterol (VENTOLIN HFA) 90 mcg/act inhaler    cetirizine (ZyrTEC) 10 mg tablet    Other Relevant Orders    Comprehensive metabolic panel    CBC and differential       Cardiovascular and Mediastinum    Atherosclerotic heart disease of native coronary artery without angina pectoris    Relevant Orders    Comprehensive metabolic panel    CBC and differential    Benign essential hypertension    Relevant Orders    Comprehensive metabolic panel    CBC and differential       Musculoskeletal and Integument    Lumbosacral spondylosis without myelopathy    Relevant Orders    Comprehensive metabolic panel    CBC and differential       Other    Vitamin D deficiency    Relevant Orders    Comprehensive metabolic panel    CBC and differential    Vitamin D 25 hydroxy    Pure hypercholesterolemia    Relevant Orders    Comprehensive metabolic panel    CBC and differential    Lipid panel    S/P coronary artery stent placement    Relevant Orders    Comprehensive metabolic panel    CBC and differential    Right wrist pain    Relevant Orders    Ambulatory referral to Hand Surgery    XR wrist 3+ vw right    Comprehensive metabolic panel    CBC and differential    Cough    Relevant Medications    albuterol (VENTOLIN HFA) 90 mcg/act inhaler    cetirizine (ZyrTEC) 10 mg tablet    Other Relevant Orders    Comprehensive metabolic panel    CBC and differential    Medicare annual wellness visit, subsequent - Primary      Other Visit Diagnoses     Screening for thyroid disorder        Relevant Orders    TSH, 3rd generation with Free T4 reflex            Subjective:      Patient ID: Sima Page is a 71 y o  male  Lucia Foreman is here today for a follow up visit  He does have a  history of: NSTEMI s/p CADEN x2 to LAD and balloon angioplasty to D1 9/19/10 (residual moderate LCX disease), preserved LVEF, bradycardia, HTN, HLD, morbid obesity  He did have a repeat stress test back in April which was normal  He does follow up with Cardiology Dr Arun Kim in [place]  He states he did not make a follow up appointment  He is doing well but is having some wrist pain and can barely at times move his right wrist  He states in the past he did have an EMG done which did show the start of carpal tunnel  He state both his hands are going numb and very bothersome  He would like a referral  He also is noticing 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  He is starting with some congestion and is not sure if this is allergy related  He would like something called in for this  He denies any chest pain, SOB, or palpitations  He is deferring the Flu vaccine  He is UTD on his screenings  He denies any other issues  The following portions of the patient's history were reviewed and updated as appropriate:   He  has a past medical history of Cardiac disease, Hypertension, and Myocardial infarction ([facility] [address] )    He   Patient Active Problem List    Diagnosis Date Noted    Right wrist pain 09/20/2019    Iatrogenic pulmonary embolism and infarction ([facility] [address] ) 09/20/2019    Cough 09/20/2019    Medicare annual wellness visit, subsequent 09/20/2019    Colon cancer screening 03/07/2019    Acquired scoliosis 03/05/2019    Carotid artery occlusion 03/05/2019    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 03/05/2019    Lumbosacral spondylosis without myelopathy 03/05/2019    Old intraocular magnetic foreign body 03/05/2019    Overweight 09/04/2018    Pure hypercholesterolemia 09/04/2018    S/P coronary artery stent placement 09/04/2018    Joint inflammation 05/30/2017    Localized primary osteoarthritis of wrist 02/01/2017    Vitamin D deficiency 03/06/2015    Anxiety 05/21/2013    Atherosclerotic heart disease of native coronary artery without angina pectoris 11/29/2012    Hyperlipidemia 11/29/2012    Coronary atherosclerosis 11/17/2010    Benign essential hypertension 11/17/2010    Osteoarthritis of hip 90/64/2106    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 11/11/2008     He  has a past surgical history that includes Coronary stent placement; Total hip arthroplasty; Hernia repair; Other surgical history (09/18/2010); Tonsillectomy and adenoidectomy; and pr colonoscopy flx dx w/collj spec when pfrmd (N/A, 3/27/2019)  His family history includes Atrial fibrillation in his father; Breast cancer in his mother; Coronary artery disease in his mother; Diabetes in his mother; Hyperlipidemia in his mother  He  reports that he has never smoked  He has never used smokeless tobacco  He reports that he does not drink alcohol or use drugs  Current Outpatient Medications   Medication Sig Dispense Refill    aspirin 81 MG tablet Take 81 mg by mouth daily        Cholecalciferol (VITAMIN D3) 07741 units TABS Take one tablet by mouth once weekly 4 tablet 3    Garlic Oil 8602 MG CAPS Take 2 capsules by mouth daily      nitroglycerin (NITROSTAT) 0 4 mg SL tablet Place 1 tablet (0 4 mg total) under the tongue every 5 (five) minutes as needed for chest pain 25 tablet 3    rosuvastatin (CRESTOR) 20 MG tablet Take 1 tablet (20 mg total) by mouth daily 90 tablet 3    albuterol (VENTOLIN HFA) 90 mcg/act inhaler Inhale 2 puffs every 6 (six) hours as needed for wheezing 1 Inhaler 5    cetirizine (ZyrTEC) 10 mg tablet Take 1 tablet (10 mg total) by mouth daily 30 tablet 3     No current facility-administered medications for this visit  Current Outpatient Medications on File Prior to Visit   Medication Sig    aspirin 81 MG tablet Take 81 mg by mouth daily   Cholecalciferol (VITAMIN D3) 64805 units TABS Take one tablet by mouth once weekly    Garlic Oil 3856 MG CAPS Take 2 capsules by mouth daily    nitroglycerin (NITROSTAT) 0 4 mg SL tablet Place 1 tablet (0 4 mg total) under the tongue every 5 (five) minutes as needed for chest pain    rosuvastatin (CRESTOR) 20 MG tablet Take 1 tablet (20 mg total) by mouth daily    [DISCONTINUED] escitalopram (LEXAPRO) 10 mg tablet Take 1 tablet (10 mg total) by mouth daily for 90 days (Patient not taking: Reported on 3/5/2019)     No current facility-administered medications on file prior to visit  He is allergic to erythromycin base; other; oxycodone-acetaminophen; percocet [oxycodone-acetaminophen]; and penicillins       Review of Systems   Constitutional: Negative  HENT: Negative  Eyes: Negative  Respiratory: Positive for cough  Cardiovascular: Negative  Gastrointestinal: Negative  Endocrine: Negative  Genitourinary: Negative  Musculoskeletal:        Right wrist pain   Skin: Negative  Allergic/Immunologic: Positive for environmental allergies  Neurological: Negative  Hematological: Negative  Psychiatric/Behavioral: Negative          Below is the patient's most recent value for Albumin, ALT, AST, BUN, Calcium, Chloride, Cholesterol, CO2, Creatinine, GFR, Glucose, HDL, Hematocrit, Hemoglobin, Hemoglobin A1C, LDL, Magnesium, Phosphorus, Platelets, Potassium, PSA, Sodium, Triglycerides, and WBC  Lab Results   Component Value Date    ALT 28 09/13/2019    AST 31 09/13/2019    BUN 24 09/13/2019    CALCIUM 9 0 09/13/2019     09/13/2019    CHOL 173 03/26/2015    CO2 28 09/13/2019    CREATININE 1 27 09/13/2019    HDL 43 09/13/2019    HCT 48 1 09/13/2019    HGB 16 3 09/13/2019    MG 2 3 03/19/2018     09/13/2019    K 4 5 09/13/2019    PSA 0 6 03/19/2018     (L) 04/02/2015    TRIG 117 09/13/2019    WBC 6 32 09/13/2019     Note: for a comprehensive list of the patient's lab results, access the Results Review activity  Objective:      /76 (BP Location: Left arm, Patient Position: Sitting, Cuff Size: Large)   Pulse 65   Temp 97 6 °F (36 4 °C) (Temporal)   Resp 18   Ht 5' 9" (1 753 m)   Wt 117 kg (257 lb 8 oz)   SpO2 97%   BMI 38 03 kg/m²          Physical Exam   Constitutional: He is oriented to person, place, and time  He appears well-developed and well-nourished  HENT:   Head: Normocephalic and atraumatic  Right Ear: External ear normal    Left Ear: External ear normal    Nose: Nose normal    Mouth/Throat: Oropharynx is clear and moist    Eyes: Pupils are equal, round, and reactive to light  Conjunctivae and EOM are normal    Neck: Normal range of motion  Neck supple  Cardiovascular: Normal rate, regular rhythm, normal heart sounds and intact distal pulses  Pulmonary/Chest: Effort normal and breath sounds normal    Abdominal: Soft  Bowel sounds are normal    Musculoskeletal: Normal range of motion  Pain and stiffness with moving right wrist   Neurological: He is alert and oriented to person, place, and time  Skin: Skin is warm and dry  Capillary refill takes less than 2 seconds  Psychiatric: He has a normal mood and affect  His behavior is normal  Judgment and thought content normal    Vitals reviewed

## 2019-09-20 NOTE — PATIENT INSTRUCTIONS
Medicare Preventive Visit Patient Instructions  Thank you for completing your Welcome to Medicare Visit or Medicare Annual Wellness Visit today  Your next wellness visit will be due in one year (9/20/2020)  The screening/preventive services that you may require over the next 5-10 years are detailed below  Some tests may not apply to you based off risk factors and/or age  Screening tests ordered at today's visit but not completed yet may show as past due  Also, please note that scanned in results may not display below  Preventive Screenings:  Service Recommendations Previous Testing/Comments   Colorectal Cancer Screening  · Colonoscopy    · Fecal Occult Blood Test (FOBT)/Fecal Immunochemical Test (FIT)  · Fecal DNA/Cologuard Test  · Flexible Sigmoidoscopy Age: 54-65 years old   Colonoscopy: every 10 years (May be performed more frequently if at higher risk)  OR  FOBT/FIT: every 1 year  OR  Cologuard: every 3 years  OR  Sigmoidoscopy: every 5 years  Screening may be recommended earlier than age 48 if at higher risk for colorectal cancer  Also, an individualized decision between you and your healthcare provider will decide whether screening between the ages of 74-80 would be appropriate   Colonoscopy: 03/27/2019  FOBT/FIT: Not on file  Cologuard: Not on file  Sigmoidoscopy: Not on file    Screening Current     Prostate Cancer Screening Individualized decision between patient and health care provider in men between ages of 53-78   Medicare will cover every 12 months beginning on the day after your 50th birthday PSA: 0 6 ng/mL          Hepatitis C Screening Once for adults born between 1945 and 1965  More frequently in patients at high risk for Hepatitis C Hep C Antibody: 03/05/2019    Screening Current   Diabetes Screening 1-2 times per year if you're at risk for diabetes or have pre-diabetes Fasting glucose: 101 mg/dL   A1C: No results in last 5 years    Screening Current   Cholesterol Screening Once every 5 years if you don't have a lipid disorder  May order more often based on risk factors  Lipid panel: 09/13/2019    Screening Not Indicated  History Lipid Disorder      Other Preventive Screenings Covered by Medicare:  1  Abdominal Aortic Aneurysm (AAA) Screening: covered once if your at risk  You're considered to be at risk if you have a family history of AAA or a male between the age of 73-68 who smoking at least 100 cigarettes in your lifetime  2  Lung Cancer Screening: covers low dose CT scan once per year if you meet all of the following conditions: (1) Age 50-69; (2) No signs or symptoms of lung cancer; (3) Current smoker or have quit smoking within the last 15 years; (4) You have a tobacco smoking history of at least 30 pack years (packs per day x number of years you smoked); (5) You get a written order from a healthcare provider  3  Glaucoma Screening: covered annually if you're considered high risk: (1) You have diabetes OR (2) Family history of glaucoma OR (3)  aged 48 and older OR (3)  American aged 72 and older  3  Osteoporosis Screening: covered every 2 years if you meet one of the following conditions: (1) Have a vertebral abnormality; (2) On glucocorticoid therapy for more than 3 months; (3) Have primary hyperparathyroidism; (4) On osteoporosis medications and need to assess response to drug therapy  5  HIV Screening: covered annually if you're between the age of 12-76  Also covered annually if you are younger than 13 and older than 72 with risk factors for HIV infection  For pregnant patients, it is covered up to 3 times per pregnancy      Immunizations:  Immunization Recommendations   Influenza Vaccine Annual influenza vaccination during flu season is recommended for all persons aged >= 6 months who do not have contraindications   Pneumococcal Vaccine (Prevnar and Pneumovax)  * Prevnar = PCV13  * Pneumovax = PPSV23 Adults 25-60 years old: 1-3 doses may be recommended based on certain risk factors  Adults 72 years old: Prevnar (PCV13) vaccine recommended followed by Pneumovax (PPSV23) vaccine  If already received PPSV23 since turning 65, then PCV13 recommended at least one year after PPSV23 dose  Hepatitis B Vaccine 3 dose series if at intermediate or high risk (ex: diabetes, end stage renal disease, liver disease)   Tetanus (Td) Vaccine - COST NOT COVERED BY MEDICARE PART B Following completion of primary series, a booster dose should be given every 10 years to maintain immunity against tetanus  Td may also be given as tetanus wound prophylaxis  Tdap Vaccine - COST NOT COVERED BY MEDICARE PART B Recommended at least once for all adults  For pregnant patients, recommended with each pregnancy  Shingles Vaccine (Shingrix) - COST NOT COVERED BY MEDICARE PART B  2 shot series recommended in those aged 48 and above     Health Maintenance Due:      Topic Date Due    CRC Screening: Colonoscopy  03/27/2029    Hepatitis C Screening  Completed     Immunizations Due:      Topic Date Due    Pneumococcal Vaccine: 65+ Years (1 of 2 - PCV13) 04/26/2019     Advance Directives   What are advance directives? Advance directives are legal documents that state your wishes and plans for medical care  These plans are made ahead of time in case you lose your ability to make decisions for yourself  Advance directives can apply to any medical decision, such as the treatments you want, and if you want to donate organs  What are the types of advance directives? There are many types of advance directives, and each state has rules about how to use them  You may choose a combination of any of the following:  · Living will: This is a written record of the treatment you want  You can also choose which treatments you do not want, which to limit, and which to stop at a certain time  This includes surgery, medicine, IV fluid, and tube feedings  · Durable power of  for healthcare [facility]):   This is a written record that states who you want to make healthcare choices for you when you are unable to make them for yourself  This person, called a proxy, is usually a family member or a friend  You may choose more than 1 proxy  · Do not resuscitate (DNR) order:  A DNR order is used in case your heart stops beating or you stop breathing  It is a request not to have certain forms of treatment, such as CPR  A DNR order may be included in other types of advance directives  · Medical directive: This covers the care that you want if you are in a coma, near death, or unable to make decisions for yourself  You can list the treatments you want for each condition  Treatment may include pain medicine, surgery, blood transfusions, dialysis, IV or tube feedings, and a ventilator (breathing machine)  · Values history: This document has questions about your views, beliefs, and how you feel and think about life  This information can help others choose the care that you would choose  Why are advance directives important? An advance directive helps you control your care  Although spoken wishes may be used, it is better to have your wishes written down  Spoken wishes can be misunderstood, or not followed  Treatments may be given even if you do not want them  An advance directive may make it easier for your family to make difficult choices about your care  Weight Management   Why it is important to manage your weight:  Being overweight increases your risk of health conditions such as heart disease, high blood pressure, type 2 diabetes, and certain types of cancer  It can also increase your risk for osteoarthritis, sleep apnea, and other respiratory problems  Aim for a slow, steady weight loss  Even a small amount of weight loss can lower your risk of health problems  How to lose weight safely:  A safe and healthy way to lose weight is to eat fewer calories and get regular exercise   You can lose up about 1 pound a week by decreasing the number of calories you eat by 500 calories each day  Healthy meal plan for weight management:  A healthy meal plan includes a variety of foods, contains fewer calories, and helps you stay healthy  A healthy meal plan includes the following:  · Eat whole-grain foods more often  A healthy meal plan should contain fiber  Fiber is the part of grains, fruits, and vegetables that is not broken down by your body  Whole-grain foods are healthy and provide extra fiber in your diet  Some examples of whole-grain foods are whole-wheat breads and pastas, oatmeal, brown rice, and bulgur  · Eat a variety of vegetables every day  Include dark, leafy greens such as spinach, kale, kvng greens, and mustard greens  Eat yellow and orange vegetables such as carrots, sweet potatoes, and winter squash  · Eat a variety of fruits every day  Choose fresh or canned fruit (canned in its own juice or light syrup) instead of juice  Fruit juice has very little or no fiber  · Eat low-fat dairy foods  Drink fat-free (skim) milk or 1% milk  Eat fat-free yogurt and low-fat cottage cheese  Try low-fat cheeses such as mozzarella and other reduced-fat cheeses  · Choose meat and other protein foods that are low in fat  Choose beans or other legumes such as split peas or lentils  Choose fish, skinless poultry (chicken or turkey), or lean cuts of red meat (beef or pork)  Before you cook meat or poultry, cut off any visible fat  · Use less fat and oil  Try baking foods instead of frying them  Add less fat, such as margarine, sour cream, regular salad dressing and mayonnaise to foods  Eat fewer high-fat foods  Some examples of high-fat foods include french fries, doughnuts, ice cream, and cakes  · Eat fewer sweets  Limit foods and drinks that are high in sugar  This includes candy, cookies, regular soda, and sweetened drinks  Exercise:  Exercise at least 30 minutes per day on most days of the week   Some examples of exercise include walking, biking, dancing, and swimming  You can also fit in more physical activity by taking the stairs instead of the elevator or parking farther away from stores  Ask your healthcare provider about the best exercise plan for you  © Copyright [employer] 2018 Information is for End User's use only and may not be sold, redistributed or otherwise used for commercial purposes  All illustrations and images included in CareNotes® are the copyrighted property of [employer]  or [facility] Preventive Visit Patient Instructions  Thank you for completing your Welcome to Medicare Visit or Medicare Annual Wellness Visit today  Your next wellness visit will be due in one year (9/20/2020)  The screening/preventive services that you may require over the next 5-10 years are detailed below  Some tests may not apply to you based off risk factors and/or age  Screening tests ordered at today's visit but not completed yet may show as past due  Also, please note that scanned in results may not display below  Preventive Screenings:  Service Recommendations Previous Testing/Comments   Colorectal Cancer Screening  · Colonoscopy    · Fecal Occult Blood Test (FOBT)/Fecal Immunochemical Test (FIT)  · Fecal DNA/Cologuard Test  · Flexible Sigmoidoscopy Age: 54-65 years old   Colonoscopy: every 10 years (May be performed more frequently if at higher risk)  OR  FOBT/FIT: every 1 year  OR  Cologuard: every 3 years  OR  Sigmoidoscopy: every 5 years  Screening may be recommended earlier than age 48 if at higher risk for colorectal cancer  Also, an individualized decision between you and your healthcare provider will decide whether screening between the ages of 74-80 would be appropriate   Colonoscopy: 03/27/2019  FOBT/FIT: Not on file  Cologuard: Not on file  Sigmoidoscopy: Not on file    Screening Current     Prostate Cancer Screening Individualized decision between patient and health care provider in men between ages of 53-78   Medicare will cover every 12 months beginning on the day after your 50th birthday PSA: 0 6 ng/mL          Hepatitis C Screening Once for adults born between 1945 and 1965  More frequently in patients at high risk for Hepatitis C Hep C Antibody: 03/05/2019    Screening Current   Diabetes Screening 1-2 times per year if you're at risk for diabetes or have pre-diabetes Fasting glucose: 101 mg/dL   A1C: No results in last 5 years    Screening Current   Cholesterol Screening Once every 5 years if you don't have a lipid disorder  May order more often based on risk factors  Lipid panel: 09/13/2019    Screening Not Indicated  History Lipid Disorder      Other Preventive Screenings Covered by Medicare:  6  Abdominal Aortic Aneurysm (AAA) Screening: covered once if your at risk  You're considered to be at risk if you have a family history of AAA or a male between the age of 73-68 who smoking at least 100 cigarettes in your lifetime  7  Lung Cancer Screening: covers low dose CT scan once per year if you meet all of the following conditions: (1) Age 50-69; (2) No signs or symptoms of lung cancer; (3) Current smoker or have quit smoking within the last 15 years; (4) You have a tobacco smoking history of at least 30 pack years (packs per day x number of years you smoked); (5) You get a written order from a healthcare provider  8  Glaucoma Screening: covered annually if you're considered high risk: (1) You have diabetes OR (2) Family history of glaucoma OR (3)  aged 48 and older OR (3)  American aged 72 and older  5  Osteoporosis Screening: covered every 2 years if you meet one of the following conditions: (1) Have a vertebral abnormality; (2) On glucocorticoid therapy for more than 3 months; (3) Have primary hyperparathyroidism; (4) On osteoporosis medications and need to assess response to drug therapy  10  HIV Screening: covered annually if you're between the age of 12-76   Also covered annually if you are younger than 13 and older than 72 with risk factors for HIV infection  For pregnant patients, it is covered up to 3 times per pregnancy  Immunizations:  Immunization Recommendations   Influenza Vaccine Annual influenza vaccination during flu season is recommended for all persons aged >= 6 months who do not have contraindications   Pneumococcal Vaccine (Prevnar and Pneumovax)  * Prevnar = PCV13  * Pneumovax = PPSV23 Adults 25-60 years old: 1-3 doses may be recommended based on certain risk factors  Adults 72 years old: Prevnar (PCV13) vaccine recommended followed by Pneumovax (PPSV23) vaccine  If already received PPSV23 since turning 65, then PCV13 recommended at least one year after PPSV23 dose  Hepatitis B Vaccine 3 dose series if at intermediate or high risk (ex: diabetes, end stage renal disease, liver disease)   Tetanus (Td) Vaccine - COST NOT COVERED BY MEDICARE PART B Following completion of primary series, a booster dose should be given every 10 years to maintain immunity against tetanus  Td may also be given as tetanus wound prophylaxis  Tdap Vaccine - COST NOT COVERED BY MEDICARE PART B Recommended at least once for all adults  For pregnant patients, recommended with each pregnancy  Shingles Vaccine (Shingrix) - COST NOT COVERED BY MEDICARE PART B  2 shot series recommended in those aged 48 and above     Health Maintenance Due:      Topic Date Due    CRC Screening: Colonoscopy  03/27/2029    Hepatitis C Screening  Completed     Immunizations Due:      Topic Date Due    Pneumococcal Vaccine: 65+ Years (1 of 2 - PCV13) 04/26/2019     Advance Directives   What are advance directives? Advance directives are legal documents that state your wishes and plans for medical care  These plans are made ahead of time in case you lose your ability to make decisions for yourself   Advance directives can apply to any medical decision, such as the treatments you want, and if you want to donate organs  What are the types of advance directives? There are many types of advance directives, and each state has rules about how to use them  You may choose a combination of any of the following:  · Living will: This is a written record of the treatment you want  You can also choose which treatments you do not want, which to limit, and which to stop at a certain time  This includes surgery, medicine, IV fluid, and tube feedings  · Durable power of  for healthcare [facility]): This is a written record that states who you want to make healthcare choices for you when you are unable to make them for yourself  This person, called a proxy, is usually a family member or a friend  You may choose more than 1 proxy  · Do not resuscitate (DNR) order:  A DNR order is used in case your heart stops beating or you stop breathing  It is a request not to have certain forms of treatment, such as CPR  A DNR order may be included in other types of advance directives  · Medical directive: This covers the care that you want if you are in a coma, near death, or unable to make decisions for yourself  You can list the treatments you want for each condition  Treatment may include pain medicine, surgery, blood transfusions, dialysis, IV or tube feedings, and a ventilator (breathing machine)  · Values history: This document has questions about your views, beliefs, and how you feel and think about life  This information can help others choose the care that you would choose  Why are advance directives important? An advance directive helps you control your care  Although spoken wishes may be used, it is better to have your wishes written down  Spoken wishes can be misunderstood, or not followed  Treatments may be given even if you do not want them  An advance directive may make it easier for your family to make difficult choices about your care     Weight Management   Why it is important to manage your weight:  Being overweight increases your risk of health conditions such as heart disease, high blood pressure, type 2 diabetes, and certain types of cancer  It can also increase your risk for osteoarthritis, sleep apnea, and other respiratory problems  Aim for a slow, steady weight loss  Even a small amount of weight loss can lower your risk of health problems  How to lose weight safely:  A safe and healthy way to lose weight is to eat fewer calories and get regular exercise  You can lose up about 1 pound a week by decreasing the number of calories you eat by 500 calories each day  Healthy meal plan for weight management:  A healthy meal plan includes a variety of foods, contains fewer calories, and helps you stay healthy  A healthy meal plan includes the following:  · Eat whole-grain foods more often  A healthy meal plan should contain fiber  Fiber is the part of grains, fruits, and vegetables that is not broken down by your body  Whole-grain foods are healthy and provide extra fiber in your diet  Some examples of whole-grain foods are whole-wheat breads and pastas, oatmeal, brown rice, and bulgur  · Eat a variety of vegetables every day  Include dark, leafy greens such as spinach, kale, kvng greens, and mustard greens  Eat yellow and orange vegetables such as carrots, sweet potatoes, and winter squash  · Eat a variety of fruits every day  Choose fresh or canned fruit (canned in its own juice or light syrup) instead of juice  Fruit juice has very little or no fiber  · Eat low-fat dairy foods  Drink fat-free (skim) milk or 1% milk  Eat fat-free yogurt and low-fat cottage cheese  Try low-fat cheeses such as mozzarella and other reduced-fat cheeses  · Choose meat and other protein foods that are low in fat  Choose beans or other legumes such as split peas or lentils  Choose fish, skinless poultry (chicken or turkey), or lean cuts of red meat (beef or pork)  Before you cook meat or poultry, cut off any visible fat  · Use less fat and oil  Try baking foods instead of frying them  Add less fat, such as margarine, sour cream, regular salad dressing and mayonnaise to foods  Eat fewer high-fat foods  Some examples of high-fat foods include french fries, doughnuts, ice cream, and cakes  · Eat fewer sweets  Limit foods and drinks that are high in sugar  This includes candy, cookies, regular soda, and sweetened drinks  Exercise:  Exercise at least 30 minutes per day on most days of the week  Some examples of exercise include walking, biking, dancing, and swimming  You can also fit in more physical activity by taking the stairs instead of the elevator or parking farther away from stores  Ask your healthcare provider about the best exercise plan for you  © Copyright [employer] 2018 Information is for End User's use only and may not be sold, redistributed or otherwise used for commercial purposes   All illustrations and images included in CareNotes® are the copyrighted property of [employer]  or [address] [place] [address]

## 2019-09-26 ENCOUNTER — APPOINTMENT (OUTPATIENT)
Dept: RADIOLOGY | Facility: MEDICAL CENTER | Age: 64
End: 2019-09-26
Payer: MEDICARE

## 2019-09-26 DIAGNOSIS — M25.531 RIGHT WRIST PAIN: ICD-10-CM

## 2019-09-26 PROCEDURE — 73110 X-RAY EXAM OF WRIST: CPT

## 2019-11-14 ENCOUNTER — IMMUNIZATIONS (OUTPATIENT)
Dept: INTERNAL MEDICINE CLINIC | Facility: CLINIC | Age: 64
End: 2019-11-14
Payer: MEDICARE

## 2019-11-14 DIAGNOSIS — Z23 NEED FOR PNEUMOCOCCAL VACCINATION: Primary | ICD-10-CM

## 2019-11-14 DIAGNOSIS — Z23 NEED FOR INFLUENZA VACCINATION: ICD-10-CM

## 2019-11-14 PROCEDURE — 90662 IIV NO PRSV INCREASED AG IM: CPT

## 2019-11-14 PROCEDURE — G0008 ADMIN INFLUENZA VIRUS VAC: HCPCS

## 2019-11-14 PROCEDURE — G0009 ADMIN PNEUMOCOCCAL VACCINE: HCPCS

## 2019-11-14 PROCEDURE — 90670 PCV13 VACCINE IM: CPT

## 2020-01-27 ENCOUNTER — TRANSCRIBE ORDERS (OUTPATIENT)
Dept: PHYSICAL THERAPY | Facility: CLINIC | Age: 65
End: 2020-01-27

## 2020-01-28 NOTE — PROGRESS NOTES
PT Evaluation     Today's date: 2020  Patient name: Venkat Lindo  : 1955  MRN: [id number]  Referring provider: Monuika Walter MD  Dx:   Encounter Diagnosis     ICD-10-CM    1  Chronic right shoulder pain M25 511     G89 29                   Assessment  Assessment details:   CURRENT FUNCTIONAL STATUS    Sleep tolerance: 1-2 hours  Able to reach overhead with moderate pain  Able to lift 8 lbs with moderate pain  SHORT TERM GOALS (2 WEEKS)    Increase shoulder flexion and ER AROM by 10 degrees  Increase shoulder strength by 3-5 lbs in all weak areas  Decrease pain to 0-3/10  Sleep tolerance: 3-4 hours  Able to reach overhead with minimal pain  Able to lift 8 lbs with minimal pain  LONG TERM GOALS (DISCHARGE)    Shoulder AROM: F=170 deg, ABD=180 deg, ER=45 deg, IR BTB=T-L JCT  Shoulder Strength: F=57 lbs, ABD=53 lbs without pain, ER=25 lbs without pain, IR=43 lbs  Decrease pain to 0-2/10  Sleep tolerance: 5-6 hours  Able to reach overhead without pain  Able to lift 10 lbs without pain  Understanding of Dx/Px/POC: good   Prognosis: good    Goals  See assessment details above  Plan  Plan details: Venkat Lindo is a 71 y o  male presenting to PT with pain, decreased range of motion, decreased strength, and decreased tolerance to activity  This patient would benefit from skilled PT services to address these issues and to maximize function  A home exercise program was provided and all questions were answered   Thank you for the referral       Patient would benefit from: skilled physical therapy  Planned modality interventions: unattended electrical stimulation, thermotherapy: hydrocollator packs and cryotherapy  Planned therapy interventions: manual therapy, neuromuscular re-education, therapeutic activities and therapeutic exercise  Frequency: 2x week  Duration in weeks: 4        Subjective Evaluation    History of Present Illness  Mechanism of injury: CC: Right shoulder pain, stiffness, and weakness  HPI: The patient's right shoulder problem began in  after a fall  He states that he did not recover fully from it, still having some pain and weakness that limited his ability to lift  Recent x-rays did not reveal any pathology  He had an injection on 2020 with good pain relief noted  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  Pain  Current pain ratin  At best pain ratin  At worst pain ratin    Hand dominance: right    Patient Goals  Patient goals for therapy: decreased pain, increased motion and increased strength          Objective     General Comments:      Shoulder Comments   CURRENT OBJECTIVE MEASUREMENTS    Shoulder AROM: F=160 deg, ABD=180 deg, ER=25 deg, IR BTB=L-S JCT  Shoulder Strength: F=39 lbs, ABD=47 lbs with pain, ER=12 lbs with pain, IR=43 lbs  Precautions: None    Daily Treatment Diary     Manual          PROM                  Exercise Diary          C-T retraction sitting 10x TID HEP        C-T extension sitting 10x TID HEP        Scapular retraction standing 10x TID HEP        Sitting posture correction RK        Impingement education   RK        UBE: S         T-Band High Row         T-Band Mid Row         T-Band Low Row         T-Band Biceps         T-Band Triceps         T-Band SA Pulldowns         T-Band IR         T-Band ER         Forward/Lateral Raises         Hoist Row: S          Lat Pulldown: S         XO Biceps         XO Triceps         XO Upright Row         XO IR         XO ER         XO SA Pulldown         Carrie         Ball Stabilization         Bodyblade                  Modalities          CP/IFC

## 2020-01-29 ENCOUNTER — EVALUATION (OUTPATIENT)
Dept: PHYSICAL THERAPY | Facility: CLINIC | Age: 65
End: 2020-01-29
Payer: MEDICARE

## 2020-01-29 ENCOUNTER — TRANSCRIBE ORDERS (OUTPATIENT)
Dept: PHYSICAL THERAPY | Facility: CLINIC | Age: 65
End: 2020-01-29

## 2020-01-29 DIAGNOSIS — M25.511 CHRONIC RIGHT SHOULDER PAIN: Primary | ICD-10-CM

## 2020-01-29 DIAGNOSIS — G89.29 CHRONIC RIGHT SHOULDER PAIN: Primary | ICD-10-CM

## 2020-01-29 PROCEDURE — 97161 PT EVAL LOW COMPLEX 20 MIN: CPT | Performed by: PHYSICAL THERAPIST

## 2020-01-29 PROCEDURE — 97112 NEUROMUSCULAR REEDUCATION: CPT | Performed by: PHYSICAL THERAPIST

## 2020-01-29 PROCEDURE — 97110 THERAPEUTIC EXERCISES: CPT | Performed by: PHYSICAL THERAPIST

## 2020-01-29 NOTE — LETTER
2020    Charlene Mcgraw MD  [address] [address] [facility]    Patient: Queenie Campbell   YOB: 1955   Date of Visit: 2020     Encounter Diagnosis     ICD-10-CM    1  Chronic right shoulder pain M25 511     G89 29        Dear Dr Princess Sullivan: Thank you for your recent referral of Queenie Campbell  Please review the attached evaluation summary from Javy's recent visit  Please verify that you agree with the plan of care by signing the attached order  If you have any questions or concerns, please do not hesitate to call  I sincerely appreciate the opportunity to share in the care of one of your patients and hope to have another opportunity to work with you in the near future  Sincerely,    Loan Sacledo, PT      Referring Provider:      I certify that I have read the below Plan of Care and certify the need for these services furnished under this plan of treatment while under my care  Charlene Mcgraw MD  [address] [place] [address] Street: [phone]          PT Evaluation     Today's date: 2020  Patient name: Queenie Campbell  : 1955  MRN: [id number]  Referring provider: Germania Ferro MD  Dx:   Encounter Diagnosis     ICD-10-CM    1  Chronic right shoulder pain M25 511     G89 29                   Assessment  Assessment details:   CURRENT FUNCTIONAL STATUS    Sleep tolerance: 1-2 hours  Able to reach overhead with moderate pain  Able to lift 8 lbs with moderate pain  SHORT TERM GOALS (2 WEEKS)    Increase shoulder flexion and ER AROM by 10 degrees  Increase shoulder strength by 3-5 lbs in all weak areas  Decrease pain to 0-3/10  Sleep tolerance: 3-4 hours  Able to reach overhead with minimal pain  Able to lift 8 lbs with minimal pain  LONG TERM GOALS (DISCHARGE)    Shoulder AROM: F=170 deg, ABD=180 deg, ER=45 deg, IR BTB=T-L JCT    Shoulder Strength: F=57 lbs, ABD=53 lbs without pain, ER=25 lbs without pain, IR=43 lbs  Decrease pain to 0-2/10  Sleep tolerance: 5-6 hours  Able to reach overhead without pain  Able to lift 10 lbs without pain  Understanding of Dx/Px/POC: good   Prognosis: good    Goals  See assessment details above  Plan  Plan details: Alisha Wood is a 71 y o  male presenting to PT with pain, decreased range of motion, decreased strength, and decreased tolerance to activity  This patient would benefit from skilled PT services to address these issues and to maximize function  A home exercise program was provided and all questions were answered  Thank you for the referral       Patient would benefit from: skilled physical therapy  Planned modality interventions: unattended electrical stimulation, thermotherapy: hydrocollator packs and cryotherapy  Planned therapy interventions: manual therapy, neuromuscular re-education, therapeutic activities and therapeutic exercise  Frequency: 2x week  Duration in weeks: 4        Subjective Evaluation    History of Present Illness  Mechanism of injury: CC: Right shoulder pain, stiffness, and weakness  HPI: The patient's right shoulder problem began in  after a fall  He states that he did not recover fully from it, still having some pain and weakness that limited his ability to lift  Recent x-rays did not reveal any pathology  He had an injection on 2020 with good pain relief noted  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  Pain  Current pain ratin  At best pain ratin  At worst pain ratin    Hand dominance: right    Patient Goals  Patient goals for therapy: decreased pain, increased motion and increased strength          Objective     General Comments:      Shoulder Comments   CURRENT OBJECTIVE MEASUREMENTS    Shoulder AROM: F=160 deg, ABD=180 deg, ER=25 deg, IR BTB=L-S JCT    Shoulder Strength: F=39 lbs, ABD=47 lbs with pain, ER=12 lbs with pain, IR=43 lbs                Precautions: None    Daily Treatment Diary     Manual  1/29        PROM                  Exercise Diary          C-T retraction sitting 10x TID HEP        C-T extension sitting 10x TID HEP        Scapular retraction standing 10x TID HEP        Sitting posture correction RK        Impingement education   RK        UBE: S         T-Band High Row         T-Band Mid Row         T-Band Low Row         T-Band Biceps         T-Band Triceps         T-Band SA Pulldowns         T-Band IR         T-Band ER         Forward/Lateral Raises         Hoist Row: S          Lat Pulldown: S         XO Biceps         XO Triceps         XO Upright Row         XO IR         XO ER         XO SA Pulldown         Carrie         Ball Stabilization         Bodyblade                  Modalities          CP/IFC

## 2020-01-31 ENCOUNTER — OFFICE VISIT (OUTPATIENT)
Dept: PHYSICAL THERAPY | Facility: CLINIC | Age: 65
End: 2020-01-31
Payer: MEDICARE

## 2020-01-31 DIAGNOSIS — M25.511 CHRONIC RIGHT SHOULDER PAIN: Primary | ICD-10-CM

## 2020-01-31 DIAGNOSIS — G89.29 CHRONIC RIGHT SHOULDER PAIN: Primary | ICD-10-CM

## 2020-01-31 PROCEDURE — 97112 NEUROMUSCULAR REEDUCATION: CPT | Performed by: PHYSICAL THERAPIST

## 2020-01-31 PROCEDURE — 97110 THERAPEUTIC EXERCISES: CPT | Performed by: PHYSICAL THERAPIST

## 2020-01-31 NOTE — PROGRESS NOTES
Daily Note     Today's date: 2020  Patient name: Jeffery Perez  : 1955  MRN: [id number]  Referring provider: Venkata Luu MD  Dx:   Encounter Diagnosis     ICD-10-CM    1  Chronic right shoulder pain M25 511     G89 29                   Subjective: Pain is about the same as last time, but the shoulder motion is improved  Objective: R shoulder AROM is WFL  Assessment: Mild soreness was noted after exercise, patient refused CP  Plan: Progress treatment as tolerated  Precautions: None    Daily Treatment Diary     Manual         PROM                  Exercise Diary          C-T retraction sitting 10x TID HEP 5x       C-T extension sitting 10x TID HEP 5x       Scapular retraction standing 10x TID HEP 5x       Sitting posture correction RK        Impingement education   RK        SL ABD  2# 2/10       SL ER  1# 2/10                T-Band High Row         T-Band Mid Row  L5 2/10       T-Band Low Row  L4 2/10       T-Band Biceps  L5 2/10       T-Band Triceps  L4 2/10       T-Band SA Pulldowns  L4 2/10       T-Band IR  L3 2/10       T-Band ER  L1 2/10       Forward/Lateral Raises         Hoist Row: S          Lat Pulldown: S         XO Biceps         XO Triceps         XO Upright Row         XO IR         XO ER         XO SA Pulldown         Blackburns  10x ea       Ball Stabilization         Bodyblade                  Modalities          CP/IFC

## 2020-02-03 ENCOUNTER — OFFICE VISIT (OUTPATIENT)
Dept: PHYSICAL THERAPY | Facility: CLINIC | Age: 65
End: 2020-02-03
Payer: MEDICARE

## 2020-02-03 DIAGNOSIS — M25.511 CHRONIC RIGHT SHOULDER PAIN: Primary | ICD-10-CM

## 2020-02-03 DIAGNOSIS — G89.29 CHRONIC RIGHT SHOULDER PAIN: Primary | ICD-10-CM

## 2020-02-03 PROCEDURE — 97110 THERAPEUTIC EXERCISES: CPT

## 2020-02-03 PROCEDURE — 97530 THERAPEUTIC ACTIVITIES: CPT

## 2020-02-03 NOTE — PROGRESS NOTES
Daily Note     Today's date: 2/3/2020  Patient name: Memo Pacheco  : 1955  MRN: [id number]  Referring provider: Natanael Casiano MD  Dx:   Encounter Diagnosis     ICD-10-CM    1  Chronic right shoulder pain M25 511     G89 29                   Subjective: Patient reports his shoulder is less painful than it was previously  Objective: See treatment diary below  Several exercises moved to gym equipment  Assessment: Tolerated treatment well  Patient exhibited good technique with therapeutic exercises      Plan: Continue per plan of care  Precautions: None    Daily Treatment Diary     Manual   2/3      PROM                  Exercise Diary          C-T retraction sitting 10x TID HEP 5x       C-T extension sitting 10x TID HEP 5x       Scapular retraction standing 10x TID HEP 5x       Sitting posture correction RK        Impingement education   RK        SL ABD  2# 2/10 2# 2/10      SL ER  1# 2/10 2# 2/10               T-Band High Row         T-Band Mid Row  L5 2/10       T-Band Low Row  L4 2/10 L4 2/10      T-Band Biceps  L5 2/10       T-Band Triceps  L4 2/10       T-Band SA Pulldowns  L4 2/10       T-Band IR  L3 2/10 L4 2/10      T-Band ER  L1 2/10 L1 2/10      Forward/Lateral Raises         Hoist Row: S5   P3 2/10      Lat Pulldown: S         XO Biceps   P3 2/10      XO Triceps   P4 2/10      XO Upright Row         XO IR         XO ER         XO SA Pulldown   P4 2/10      Blackburns  10x ea 1# 10X      Ball Stabilization   3# 10X      Bodyblade                  Modalities          CP/IFC

## 2020-02-06 ENCOUNTER — OFFICE VISIT (OUTPATIENT)
Dept: PHYSICAL THERAPY | Facility: CLINIC | Age: 65
End: 2020-02-06
Payer: MEDICARE

## 2020-02-06 DIAGNOSIS — G89.29 CHRONIC RIGHT SHOULDER PAIN: Primary | ICD-10-CM

## 2020-02-06 DIAGNOSIS — M25.511 CHRONIC RIGHT SHOULDER PAIN: Primary | ICD-10-CM

## 2020-02-06 PROCEDURE — 97110 THERAPEUTIC EXERCISES: CPT

## 2020-02-06 PROCEDURE — 97112 NEUROMUSCULAR REEDUCATION: CPT

## 2020-02-11 ENCOUNTER — OFFICE VISIT (OUTPATIENT)
Dept: PHYSICAL THERAPY | Facility: CLINIC | Age: 65
End: 2020-02-11
Payer: MEDICARE

## 2020-02-11 DIAGNOSIS — M25.511 CHRONIC RIGHT SHOULDER PAIN: Primary | ICD-10-CM

## 2020-02-11 DIAGNOSIS — G89.29 CHRONIC RIGHT SHOULDER PAIN: Primary | ICD-10-CM

## 2020-02-11 PROCEDURE — 97535 SELF CARE MNGMENT TRAINING: CPT | Performed by: PHYSICAL THERAPIST

## 2020-02-11 NOTE — PROGRESS NOTES
PT Discharge    Today's date: 2020  Patient name: Zunilda Espino  : 1955  MRN: [id number]  Referring provider: Brii Chiang MD  Dx:   Encounter Diagnosis     ICD-10-CM    1  Chronic right shoulder pain M25 511     G89 29                   Assessment  Assessment details:   CURRENT FUNCTIONAL STATUS    Sleep tolerance: No shoulder pain  Able to reach overhead without pain  Able to lift 10 lbs without pain  LONG TERM GOALS (DISCHARGE)    Shoulder AROM: F=170 deg, ABD=180 deg, ER=45 deg, IR BTB=T-L JCT  -met  Shoulder Strength: F=57 lbs, ABD=53 lbs without pain, ER=25 lbs without pain, IR=43 lbs -partially met   Decrease pain to 0-2/10 -met   Sleep tolerance: 5-6 hours  -met   Able to reach overhead without pain -met    Able to lift 10 lbs without pain -met  Understanding of Dx/Px/POC: good   Prognosis: good    Goals  See assessment details above  Plan  Plan details: Zunilda Espino is a 71 y o  male presenting to PT with pain, decreased range of motion, decreased strength, and decreased tolerance to activity  This patient would benefit from skilled PT services to address these issues and to maximize function  A home exercise program was provided and all questions were answered  Thank you for the referral       Patient would benefit from: skilled physical therapy  Planned modality interventions: unattended electrical stimulation, thermotherapy: hydrocollator packs and cryotherapy  Planned therapy interventions: manual therapy, neuromuscular re-education, therapeutic activities, therapeutic exercise and self care  Frequency: 2x week  Duration in weeks: 4        Subjective Evaluation    History of Present Illness  Mechanism of injury: Subjective: The patient's right shoulder pain has resolved, and he notes improved strength in the shoulder  He is pleased with his current status and requests discharge to a [facility]    Pain  Current pain ratin  At best pain ratin  At worst pain rating: 0    Hand dominance: right    Patient Goals  Patient goals for therapy: decreased pain, increased motion and increased strength          Objective     General Comments:      Shoulder Comments   CURRENT OBJECTIVE MEASUREMENTS    Shoulder AROM: F=170 deg, ABD=180 deg, ER=35 deg, IR BTB=L-S JCT  Shoulder Strength: F=56 lbs, ABD=53 lbs without pain, ER=19 lbs without pain, IR=43 lbs  Precautions: None     Daily Treatment Diary      Manual  1/29 1/31 2/3 2/6 2/11     PROM                               Exercise Diary                C-T retraction sitting 10x TID HEP 5x      10x BID HEP     C-T extension sitting 10x TID HEP 5x      10x BID HEP     Scapular retraction standing 10x TID HEP 5x           Sitting posture correction RK             Impingement education  RK             SL ABD   2# 2/10 2# 2/10 2# 2/10  4#  3/10 BIW HEP     SL ER   1# 2/10 2# 2/10 2# 2/10  3# 3/10 BIW HEP                     T-Band High Row               T-Band Mid Row   L5 2/10           T-Band Low Row   L4 2/10 L4 2/10 L4 3/10       T-Band Biceps   L5 2/10           T-Band Triceps   L4 2/10           T-Band SA Pulldowns   L4 2/10           T-Band IR   L3 2/10 L4 2/10 L4 3/10       T-Band ER   L1 2/10 L1 2/10 L1 3/10       Forward/Lateral Raises               Hoist Row: S5     P3 2/10 P4 3/10       Lat Pulldown:  S       P4 3/10       XO Biceps     P3 2/10 P3 3/10       XO Triceps     P4 2/10 P4 3/10       XO Upright Row               XO IR               XO ER               XO SA Pulldown     P4 2/10 P4 3/10       Blackburns   10x ea 1# 10X 2# 2/10  4# 3/10 BIW HEP     Ball Stabilization     3# 10X 3# 10X       Bodyblade                               Modalities                CP/IFC

## 2020-02-11 NOTE — LETTER
2020    Gasper Whitlock MD  [facility] [place] [address] [facility]    Patient: Stephon Pena   YOB: 1955   Date of Visit: 2020     Encounter Diagnosis     ICD-10-CM    1  Chronic right shoulder pain M25 511     G89 29        Dear Dr Leodan Doty: Thank you for your recent referral of Stephon Pena  Please review the attached discharge summary from Javy's recent visit  If you have any questions or concerns, please do not hesitate to call  I sincerely appreciate the opportunity to share in the care of one of your patients and hope to have another opportunity to work with you in the near future  Sincerely,    Adrianna Vang, PT      Referring Provider:                        Gasper Whitlock MD  [address] [place] [address]: [phone]          PT Discharge    Today's date: 2020  Patient name: Stephon Pena  : 1955  MRN: [id number]  Referring provider: Jennifer Cannon MD  Dx:   Encounter Diagnosis     ICD-10-CM    1  Chronic right shoulder pain M25 511     G89 29                   Assessment  Assessment details:   CURRENT FUNCTIONAL STATUS    Sleep tolerance: No shoulder pain  Able to reach overhead without pain  Able to lift 10 lbs without pain  LONG TERM GOALS (DISCHARGE)    Shoulder AROM: F=170 deg, ABD=180 deg, ER=45 deg, IR BTB=T-L JCT  -met  Shoulder Strength: F=57 lbs, ABD=53 lbs without pain, ER=25 lbs without pain, IR=43 lbs -partially met   Decrease pain to 0-2/10 -met   Sleep tolerance: 5-6 hours  -met   Able to reach overhead without pain -met    Able to lift 10 lbs without pain -met  Understanding of Dx/Px/POC: good   Prognosis: good    Goals  See assessment details above  Plan  Plan details: Stephon Pena is a 71 y o  male presenting to PT with pain, decreased range of motion, decreased strength, and decreased tolerance to activity   This patient would benefit from skilled PT services to address these issues and to maximize function  A home exercise program was provided and all questions were answered  Thank you for the referral       Patient would benefit from: skilled physical therapy  Planned modality interventions: unattended electrical stimulation, thermotherapy: hydrocollator packs and cryotherapy  Planned therapy interventions: manual therapy, neuromuscular re-education, therapeutic activities, therapeutic exercise and self care  Frequency: 2x week  Duration in weeks: 4        Subjective Evaluation    History of Present Illness  Mechanism of injury: Subjective: The patient's right shoulder pain has resolved, and he notes improved strength in the shoulder  He is pleased with his current status and requests discharge to a [facility]  Pain  Current pain ratin  At best pain ratin  At worst pain ratin    Hand dominance: right    Patient Goals  Patient goals for therapy: decreased pain, increased motion and increased strength          Objective     General Comments:      Shoulder Comments   CURRENT OBJECTIVE MEASUREMENTS    Shoulder AROM: F=170 deg, ABD=180 deg, ER=35 deg, IR BTB=L-S JCT  Shoulder Strength: F=56 lbs, ABD=53 lbs without pain, ER=19 lbs without pain, IR=43 lbs  Precautions: None     Daily Treatment Diary      Manual   23 2/     PROM                               Exercise Diary                C-T retraction sitting 10x TID HEP 5x      10x BID HEP     C-T extension sitting 10x TID HEP 5x      10x BID HEP     Scapular retraction standing 10x TID HEP 5x           Sitting posture correction RK             Impingement education   RK             SL ABD   2# 2/10 2# 2/10 2# 2/10  4#  3/10 BIW HEP     SL ER   1# 2/10 2# 2/10 2# 2/10  3# 3/10 BIW HEP                     T-Band High Row               T-Band Mid Row   L5 2/10           T-Band Low Row   L4 2/10 L4 2/10 L4 3/10       T-Band Biceps   L5 2/10           T-Band Triceps   L4 2/10           T-Band SA Pulldowns   L4 2/10           T-Band IR   L3 2/10 L4 2/10 L4 3/10       T-Band ER   L1 2/10 L1 2/10 L1 3/10       Forward/Lateral Raises               Hoist Row: S5     P3 2/10 P4 3/10       Lat Pulldown:  S       P4 3/10       XO Biceps     P3 2/10 P3 3/10       XO Triceps     P4 2/10 P4 3/10       XO Upright Row               XO IR               XO ER               XO SA Pulldown     P4 2/10 P4 3/10       Blackburns   10x ea 1# 10X 2# 2/10  4# 3/10 BIW HEP     Ball Stabilization     3# 10X 3# 10X       Bodyblade                               Modalities                CP/IFC

## 2020-02-12 ENCOUNTER — TRANSCRIBE ORDERS (OUTPATIENT)
Dept: PHYSICAL THERAPY | Facility: CLINIC | Age: 65
End: 2020-02-12

## 2020-02-12 DIAGNOSIS — M25.511 RIGHT SHOULDER PAIN, UNSPECIFIED CHRONICITY: Primary | ICD-10-CM

## 2020-02-14 ENCOUNTER — APPOINTMENT (OUTPATIENT)
Dept: PHYSICAL THERAPY | Facility: CLINIC | Age: 65
End: 2020-02-14
Payer: MEDICARE

## 2020-02-18 ENCOUNTER — TRANSCRIBE ORDERS (OUTPATIENT)
Dept: ADMINISTRATIVE | Facility: HOSPITAL | Age: 65
End: 2020-02-18

## 2020-02-18 ENCOUNTER — APPOINTMENT (OUTPATIENT)
Dept: LAB | Facility: MEDICAL CENTER | Age: 65
End: 2020-02-18
Payer: MEDICARE

## 2020-02-18 DIAGNOSIS — R05.9 COUGH: ICD-10-CM

## 2020-02-18 DIAGNOSIS — M25.531 RIGHT WRIST PAIN: ICD-10-CM

## 2020-02-18 DIAGNOSIS — I10 BENIGN ESSENTIAL HYPERTENSION: ICD-10-CM

## 2020-02-18 DIAGNOSIS — I25.10 ATHEROSCLEROSIS OF NATIVE CORONARY ARTERY OF NATIVE HEART WITHOUT ANGINA PECTORIS: Primary | ICD-10-CM

## 2020-02-18 DIAGNOSIS — I25.10 ATHEROSCLEROSIS OF NATIVE CORONARY ARTERY OF NATIVE HEART WITHOUT ANGINA PECTORIS: ICD-10-CM

## 2020-02-18 DIAGNOSIS — Z95.5 S/P CORONARY ARTERY STENT PLACEMENT: ICD-10-CM

## 2020-02-18 DIAGNOSIS — T81.718A IATROGENIC PULMONARY EMBOLISM AND INFARCTION, INITIAL ENCOUNTER (HCC): ICD-10-CM

## 2020-02-18 DIAGNOSIS — M47.817 LUMBOSACRAL SPONDYLOSIS WITHOUT MYELOPATHY: ICD-10-CM

## 2020-02-18 DIAGNOSIS — E55.9 VITAMIN D DEFICIENCY: ICD-10-CM

## 2020-02-18 DIAGNOSIS — E78.00 PURE HYPERCHOLESTEROLEMIA: ICD-10-CM

## 2020-02-18 DIAGNOSIS — Z13.29 SCREENING FOR THYROID DISORDER: ICD-10-CM

## 2020-02-18 DIAGNOSIS — I26.99 IATROGENIC PULMONARY EMBOLISM AND INFARCTION, INITIAL ENCOUNTER (HCC): ICD-10-CM

## 2020-02-18 LAB
25(OH)D3 SERPL-MCNC: 74.4 NG/ML (ref 30–100)
ALBUMIN SERPL BCP-MCNC: 3.6 G/DL (ref 3.5–5)
ALP SERPL-CCNC: 40 U/L (ref 46–116)
ALT SERPL W P-5'-P-CCNC: 24 U/L (ref 12–78)
ANION GAP SERPL CALCULATED.3IONS-SCNC: 4 MMOL/L (ref 4–13)
AST SERPL W P-5'-P-CCNC: 22 U/L (ref 5–45)
BASOPHILS # BLD AUTO: 0.02 THOUSANDS/ΜL (ref 0–0.1)
BASOPHILS NFR BLD AUTO: 0 % (ref 0–1)
BILIRUB SERPL-MCNC: 1.02 MG/DL (ref 0.2–1)
BUN SERPL-MCNC: 17 MG/DL (ref 5–25)
CALCIUM SERPL-MCNC: 8.9 MG/DL (ref 8.3–10.1)
CHLORIDE SERPL-SCNC: 108 MMOL/L (ref 100–108)
CHOLEST SERPL-MCNC: 157 MG/DL (ref 50–200)
CO2 SERPL-SCNC: 26 MMOL/L (ref 21–32)
CREAT SERPL-MCNC: 1.07 MG/DL (ref 0.6–1.3)
EOSINOPHIL # BLD AUTO: 0.16 THOUSAND/ΜL (ref 0–0.61)
EOSINOPHIL NFR BLD AUTO: 2 % (ref 0–6)
ERYTHROCYTE [DISTWIDTH] IN BLOOD BY AUTOMATED COUNT: 13.5 % (ref 11.6–15.1)
GFR SERPL CREATININE-BSD FRML MDRD: 72 ML/MIN/1.73SQ M
GLUCOSE P FAST SERPL-MCNC: 105 MG/DL (ref 65–99)
HCT VFR BLD AUTO: 48.7 % (ref 36.5–49.3)
HDLC SERPL-MCNC: 58 MG/DL
HGB BLD-MCNC: 16.1 G/DL (ref 12–17)
IMM GRANULOCYTES # BLD AUTO: 0.02 THOUSAND/UL (ref 0–0.2)
IMM GRANULOCYTES NFR BLD AUTO: 0 % (ref 0–2)
LDLC SERPL CALC-MCNC: 77 MG/DL (ref 0–100)
LYMPHOCYTES # BLD AUTO: 2.34 THOUSANDS/ΜL (ref 0.6–4.47)
LYMPHOCYTES NFR BLD AUTO: 33 % (ref 14–44)
MCH RBC QN AUTO: 29.6 PG (ref 26.8–34.3)
MCHC RBC AUTO-ENTMCNC: 33.1 G/DL (ref 31.4–37.4)
MCV RBC AUTO: 90 FL (ref 82–98)
MONOCYTES # BLD AUTO: 0.58 THOUSAND/ΜL (ref 0.17–1.22)
MONOCYTES NFR BLD AUTO: 8 % (ref 4–12)
NEUTROPHILS # BLD AUTO: 4.09 THOUSANDS/ΜL (ref 1.85–7.62)
NEUTS SEG NFR BLD AUTO: 57 % (ref 43–75)
NONHDLC SERPL-MCNC: 99 MG/DL
NRBC BLD AUTO-RTO: 0 /100 WBCS
PLATELET # BLD AUTO: 213 THOUSANDS/UL (ref 149–390)
PMV BLD AUTO: 11.6 FL (ref 8.9–12.7)
POTASSIUM SERPL-SCNC: 4.4 MMOL/L (ref 3.5–5.3)
PROT SERPL-MCNC: 6.8 G/DL (ref 6.4–8.2)
RBC # BLD AUTO: 5.44 MILLION/UL (ref 3.88–5.62)
SODIUM SERPL-SCNC: 138 MMOL/L (ref 136–145)
TRIGL SERPL-MCNC: 108 MG/DL
TSH SERPL DL<=0.05 MIU/L-ACNC: 2.87 UIU/ML (ref 0.36–3.74)
WBC # BLD AUTO: 7.21 THOUSAND/UL (ref 4.31–10.16)

## 2020-02-18 PROCEDURE — 80053 COMPREHEN METABOLIC PANEL: CPT

## 2020-02-18 PROCEDURE — 85025 COMPLETE CBC W/AUTO DIFF WBC: CPT

## 2020-02-18 PROCEDURE — 80061 LIPID PANEL: CPT

## 2020-02-18 PROCEDURE — 82306 VITAMIN D 25 HYDROXY: CPT

## 2020-02-18 PROCEDURE — 84443 ASSAY THYROID STIM HORMONE: CPT

## 2020-02-18 PROCEDURE — 36415 COLL VENOUS BLD VENIPUNCTURE: CPT

## 2020-02-21 ENCOUNTER — OFFICE VISIT (OUTPATIENT)
Dept: INTERNAL MEDICINE CLINIC | Facility: CLINIC | Age: 65
End: 2020-02-21
Payer: MEDICARE

## 2020-02-21 VITALS
SYSTOLIC BLOOD PRESSURE: 122 MMHG | HEIGHT: 69 IN | HEART RATE: 68 BPM | OXYGEN SATURATION: 97 % | WEIGHT: 253.7 LBS | BODY MASS INDEX: 37.58 KG/M2 | TEMPERATURE: 97.2 F | DIASTOLIC BLOOD PRESSURE: 78 MMHG

## 2020-02-21 DIAGNOSIS — T81.718S IATROGENIC PULMONARY EMBOLISM AND INFARCTION, SEQUELA (HCC): ICD-10-CM

## 2020-02-21 DIAGNOSIS — E55.9 VITAMIN D DEFICIENCY: ICD-10-CM

## 2020-02-21 DIAGNOSIS — I10 BENIGN ESSENTIAL HYPERTENSION: Primary | ICD-10-CM

## 2020-02-21 DIAGNOSIS — M47.817 LUMBOSACRAL SPONDYLOSIS WITHOUT MYELOPATHY: ICD-10-CM

## 2020-02-21 DIAGNOSIS — E78.2 MIXED HYPERLIPIDEMIA: ICD-10-CM

## 2020-02-21 DIAGNOSIS — I26.99 IATROGENIC PULMONARY EMBOLISM AND INFARCTION, SEQUELA (HCC): ICD-10-CM

## 2020-02-21 DIAGNOSIS — I25.10 ATHEROSCLEROSIS OF NATIVE CORONARY ARTERY OF NATIVE HEART WITHOUT ANGINA PECTORIS: ICD-10-CM

## 2020-02-21 PROCEDURE — 3008F BODY MASS INDEX DOCD: CPT | Performed by: NURSE PRACTITIONER

## 2020-02-21 PROCEDURE — 4040F PNEUMOC VAC/ADMIN/RCVD: CPT | Performed by: NURSE PRACTITIONER

## 2020-02-21 PROCEDURE — 3078F DIAST BP <80 MM HG: CPT | Performed by: NURSE PRACTITIONER

## 2020-02-21 PROCEDURE — 3074F SYST BP LT 130 MM HG: CPT | Performed by: NURSE PRACTITIONER

## 2020-02-21 PROCEDURE — 99214 OFFICE O/P EST MOD 30 MIN: CPT | Performed by: NURSE PRACTITIONER

## 2020-02-21 PROCEDURE — 1036F TOBACCO NON-USER: CPT | Performed by: NURSE PRACTITIONER

## 2020-02-21 RX ORDER — EZETIMIBE 10 MG/1
TABLET ORAL
COMMUNITY
Start: 2019-12-23 | End: 2020-11-06 | Stop reason: SDUPTHER

## 2020-02-21 RX ORDER — ROSUVASTATIN CALCIUM 20 MG/1
20 TABLET, COATED ORAL DAILY
Qty: 90 TABLET | Refills: 3 | Status: SHIPPED | OUTPATIENT
Start: 2020-02-21 | End: 2020-11-06 | Stop reason: SDUPTHER

## 2020-02-21 NOTE — PROGRESS NOTES
Assessment/Plan:Patient is following up with Cardiology and did have recent stress test done which was normal  He did see them in December and is now taking Zetia  He is taking Crestor 20 mg and can not tolerate 40 mg  Last lab work did show his LDL is at 77 and triglycerides 108  Vitamin D still low at 74 4  He is deferring vaccines at this time  /77  Recent colonoscopy did show internal hemorrhoids with no polyps  Other screenings are up to date  Labs reviewed with patient and normal  Patient is deferring to follow up with Pulmonary at this time and is deferring a sleep study  Will repeat fasting labs as well  Will follow up in 6 months or sooner if need be  No problem-specific Assessment & Plan notes found for this encounter           Problem List Items Addressed This Visit        Respiratory    Iatrogenic pulmonary embolism and infarction [facility])    Relevant Orders    Comprehensive metabolic panel    CBC and differential    TSH, 3rd generation with Free T4 reflex       Cardiovascular and Mediastinum    Atherosclerotic heart disease of native coronary artery without angina pectoris    Relevant Orders    Comprehensive metabolic panel    CBC and differential    TSH, 3rd generation with Free T4 reflex    Benign essential hypertension - Primary    Relevant Orders    Comprehensive metabolic panel    CBC and differential    TSH, 3rd generation with Free T4 reflex       Musculoskeletal and Integument    Lumbosacral spondylosis without myelopathy    Relevant Orders    Comprehensive metabolic panel    CBC and differential    TSH, 3rd generation with Free T4 reflex       Other    Hyperlipidemia    Relevant Medications    rosuvastatin (CRESTOR) 20 MG tablet    Other Relevant Orders    Comprehensive metabolic panel    CBC and differential    TSH, 3rd generation with Free T4 reflex    Lipid panel    Vitamin D deficiency    Relevant Orders    Comprehensive metabolic panel    CBC and differential    TSH, 3rd generation with Free T4 reflex    Vitamin D 25 hydroxy            Subjective:      Patient ID: Barron Tatum is a 71 y o  male  Marianela Lebron is here today for a follow up visit  He is seeing Cardiology for a  past medical history of coronary disease status post non-STEMI status post drug-eluting stenting x2 in September 2010 with residual moderate left circumflex disease, hypertension, dyslipidemia, 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, reported nocturnal bradycardia, He states he does know what to go for a sleep study at this time  He did have his carpal tunnel surgery done in January and states his hand is feeling much better  He also is getting steroid injections to his right shoulder  He states that is also feeling great  He is doing PT as well  He denies any chest pain, SOB, or palpitations  He denies any constipation or diarrhea  He states he is taking the Zetia and doing well with this  He offers no other issues  The following portions of the patient's history were reviewed and updated as appropriate:   He  has a past medical history of Cardiac disease, Hypertension, and Myocardial infarction ([facility] [address] )    He   Patient Active Problem List    Diagnosis Date Noted    Right wrist pain 09/20/2019    Iatrogenic pulmonary embolism and infarction [facility]) 09/20/2019    Cough 09/20/2019    Medicare annual wellness visit, subsequent 09/20/2019    Medicare annual wellness visit, initial 09/20/2019    Screening for thyroid disorder 09/20/2019    Colon cancer screening 03/07/2019    Acquired scoliosis 03/05/2019    Carotid artery occlusion 03/05/2019    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 03/05/2019    Lumbosacral spondylosis without myelopathy 03/05/2019    Old intraocular magnetic foreign body 03/05/2019    Overweight 09/04/2018    Pure hypercholesterolemia 09/04/2018    S/P coronary artery stent placement 09/04/2018    Joint inflammation 05/30/2017    Localized primary osteoarthritis of wrist 02/01/2017    Vitamin D deficiency 03/06/2015    Anxiety 05/21/2013    Atherosclerotic heart disease of native coronary artery without angina pectoris 11/29/2012    Hyperlipidemia 11/29/2012    Coronary atherosclerosis 11/17/2010    Benign essential hypertension 11/17/2010    Osteoarthritis of hip 96/45/7595    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[facility] [address] ) 11/11/2008     He  has a past surgical history that includes Coronary stent placement; Total hip arthroplasty; Hernia repair; Other surgical history (09/18/2010); Tonsillectomy and adenoidectomy; and pr colonoscopy flx dx w/collj spec when pfrmd (N/A, 3/27/2019)  His family history includes Atrial fibrillation in his father; Breast cancer in his mother; Coronary artery disease in his mother; Diabetes in his mother; Hyperlipidemia in his mother  He  reports that he has never smoked  He has never used smokeless tobacco  He reports that he does not drink alcohol or use drugs  Current Outpatient Medications   Medication Sig Dispense Refill    albuterol (VENTOLIN HFA) 90 mcg/act inhaler Inhale 2 puffs every 6 (six) hours as needed for wheezing 1 Inhaler 5    aspirin 81 MG tablet Take 81 mg by mouth daily   cetirizine (ZyrTEC) 10 mg tablet Take 1 tablet (10 mg total) by mouth daily 30 tablet 3    Cholecalciferol (VITAMIN D3) 90594 units CAPS Take 1 capsule (50,000 Units total) by mouth once a week 12 capsule 1    ezetimibe (ZETIA) 10 mg tablet       Garlic Oil 6190 MG CAPS Take 2 capsules by mouth daily      nitroglycerin (NITROSTAT) 0 4 mg SL tablet Place 1 tablet (0 4 mg total) under the tongue every 5 (five) minutes as needed for chest pain 25 tablet 3    rosuvastatin (CRESTOR) 20 MG tablet Take 1 tablet (20 mg total) by mouth daily 90 tablet 3     No current facility-administered medications for this visit        Current Outpatient Medications on File Prior to Visit   Medication Sig    albuterol (VENTOLIN HFA) 90 mcg/act inhaler Inhale 2 puffs every 6 (six) hours as needed for wheezing    aspirin 81 MG tablet Take 81 mg by mouth daily   cetirizine (ZyrTEC) 10 mg tablet Take 1 tablet (10 mg total) by mouth daily    Cholecalciferol (VITAMIN D3) 61737 units CAPS Take 1 capsule (50,000 Units total) by mouth once a week    Garlic Oil 9777 MG CAPS Take 2 capsules by mouth daily    nitroglycerin (NITROSTAT) 0 4 mg SL tablet Place 1 tablet (0 4 mg total) under the tongue every 5 (five) minutes as needed for chest pain    [DISCONTINUED] rosuvastatin (CRESTOR) 20 MG tablet Take 1 tablet (20 mg total) by mouth daily     No current facility-administered medications on file prior to visit  He is allergic to erythromycin base; other; oxycodone-acetaminophen; percocet [oxycodone-acetaminophen]; and penicillins       Review of Systems   All other systems reviewed and are negative  Below is the patient's most recent value for Albumin, ALT, AST, BUN, Calcium, Chloride, Cholesterol, CO2, Creatinine, GFR, Glucose, HDL, Hematocrit, Hemoglobin, Hemoglobin A1C, LDL, Magnesium, Phosphorus, Platelets, Potassium, PSA, Sodium, Triglycerides, and WBC  Lab Results   Component Value Date    ALT 24 02/18/2020    AST 22 02/18/2020    BUN 17 02/18/2020    CALCIUM 8 9 02/18/2020     02/18/2020    CHOL 173 03/26/2015    CO2 26 02/18/2020    CREATININE 1 07 02/18/2020    HDL 58 02/18/2020    HCT 48 7 02/18/2020    HGB 16 1 02/18/2020    MG 2 3 03/19/2018     02/18/2020    K 4 4 02/18/2020    PSA 0 6 03/19/2018     (L) 04/02/2015    TRIG 108 02/18/2020    WBC 7 21 02/18/2020     Note: for a comprehensive list of the patient's lab results, access the Results Review activity    Objective:      /78 (BP Location: Left arm, Patient Position: Sitting, Cuff Size: Large)   Pulse 68   Temp (!) 97 2 °F (36 2 °C) (Temporal)   Ht 5' 9" (1 753 m)   Wt 115 kg (253 lb 11 2 oz)   SpO2 97%   BMI 37 46 kg/m²          Physical Exam Constitutional: He is oriented to person, place, and time  He appears well-developed and well-nourished  HENT:   Head: Normocephalic and atraumatic  Right Ear: External ear normal    Left Ear: External ear normal    Nose: Nose normal    Mouth/Throat: Oropharynx is clear and moist    Eyes: Pupils are equal, round, and reactive to light  Conjunctivae and EOM are normal    Neck: Normal range of motion  Neck supple  Cardiovascular: Normal rate, regular rhythm, normal heart sounds and intact distal pulses  Pulmonary/Chest: Effort normal and breath sounds normal    Abdominal: Soft  Bowel sounds are normal    Musculoskeletal: Normal range of motion  Neurological: He is alert and oriented to person, place, and time  Skin: Skin is warm and dry  Capillary refill takes less than 2 seconds  Psychiatric: He has a normal mood and affect  His behavior is normal  Judgment and thought content normal    Vitals reviewed

## 2020-03-19 ENCOUNTER — OFFICE VISIT (OUTPATIENT)
Dept: INTERNAL MEDICINE CLINIC | Facility: CLINIC | Age: 65
End: 2020-03-19
Payer: MEDICARE

## 2020-03-19 VITALS
BODY MASS INDEX: 38.55 KG/M2 | DIASTOLIC BLOOD PRESSURE: 80 MMHG | OXYGEN SATURATION: 100 % | SYSTOLIC BLOOD PRESSURE: 122 MMHG | WEIGHT: 260.3 LBS | TEMPERATURE: 97.1 F | HEART RATE: 80 BPM | RESPIRATION RATE: 18 BRPM | HEIGHT: 69 IN

## 2020-03-19 DIAGNOSIS — J40 BRONCHITIS: Primary | ICD-10-CM

## 2020-03-19 PROCEDURE — 3008F BODY MASS INDEX DOCD: CPT | Performed by: NURSE PRACTITIONER

## 2020-03-19 PROCEDURE — 99213 OFFICE O/P EST LOW 20 MIN: CPT | Performed by: NURSE PRACTITIONER

## 2020-03-19 PROCEDURE — 3079F DIAST BP 80-89 MM HG: CPT | Performed by: NURSE PRACTITIONER

## 2020-03-19 PROCEDURE — 4040F PNEUMOC VAC/ADMIN/RCVD: CPT | Performed by: NURSE PRACTITIONER

## 2020-03-19 PROCEDURE — 3074F SYST BP LT 130 MM HG: CPT | Performed by: NURSE PRACTITIONER

## 2020-03-19 PROCEDURE — 1036F TOBACCO NON-USER: CPT | Performed by: NURSE PRACTITIONER

## 2020-03-19 RX ORDER — BENZONATATE 200 MG/1
200 CAPSULE ORAL 3 TIMES DAILY PRN
Qty: 30 CAPSULE | Refills: 0 | Status: SHIPPED | OUTPATIENT
Start: 2020-03-19 | End: 2020-12-29

## 2020-03-19 RX ORDER — DOXYCYCLINE 100 MG/1
100 CAPSULE ORAL 2 TIMES DAILY
Qty: 20 CAPSULE | Refills: 0 | Status: SHIPPED | OUTPATIENT
Start: 2020-03-19 | End: 2020-03-29

## 2020-03-19 RX ORDER — PREDNISONE 10 MG/1
TABLET ORAL
Qty: 18 TABLET | Refills: 0 | Status: SHIPPED | OUTPATIENT
Start: 2020-03-19 | End: 2020-08-21

## 2020-03-19 NOTE — PROGRESS NOTES
Assessment/Plan: Will start on Doxcycyline 100 mg BID for 10 days, Tessalon 200 MG TID PRN, and Prednisone tapered dose  He was advised to continue supportive care and if any worsening of symptoms to call the office  No problem-specific Assessment & Plan notes found for this encounter  Problem List Items Addressed This Visit        Respiratory    Bronchitis - Primary    Relevant Medications    doxycycline monohydrate (MONODOX) 100 mg capsule    predniSONE 10 mg tablet    benzonatate (TESSALON) 200 MG capsule       Other    BMI 38 0-38 9,adult            Subjective:      Patient ID: Rosaura Dodson is a 71 y o  male  Lynn Martinez is here today for an acute visit  He states starting last week he started with cough, congestion, sore throat, and wheezing  He states he did not have any fever or SOB  He has been around his Grandkids which were sick  He offers no other issues  The following portions of the patient's history were reviewed and updated as appropriate: He  has a past medical history of Cardiac disease, Hypertension, and Myocardial infarction ([facility] [address] )    He   Patient Active Problem List    Diagnosis Date Noted    BMI 38 0-38 9,adult 03/19/2020    Bronchitis 03/19/2020    Right wrist pain 09/20/2019    Iatrogenic pulmonary embolism and infarction [facility]) 09/20/2019    Cough 09/20/2019    Medicare annual wellness visit, subsequent 09/20/2019    Medicare annual wellness visit, initial 09/20/2019    Screening for thyroid disorder 09/20/2019    Colon cancer screening 03/07/2019    Acquired scoliosis 03/05/2019    Carotid artery occlusion 03/05/2019    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 03/05/2019    Lumbosacral spondylosis without myelopathy 03/05/2019    Old intraocular magnetic foreign body 03/05/2019    Overweight 09/04/2018    Pure hypercholesterolemia 09/04/2018    S/P coronary artery stent placement 09/04/2018    Joint inflammation 05/30/2017    Localized primary osteoarthritis of wrist 02/01/2017    Vitamin D deficiency 03/06/2015    Anxiety 05/21/2013    Atherosclerotic heart disease of native coronary artery without angina pectoris 11/29/2012    Hyperlipidemia 11/29/2012    Coronary atherosclerosis 11/17/2010    Benign essential hypertension 11/17/2010    Osteoarthritis of hip 96/68/9694    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[facility] [address] ) 11/11/2008     He  has a past surgical history that includes Coronary stent placement; Total hip arthroplasty; Hernia repair; Other surgical history (09/18/2010); Tonsillectomy and adenoidectomy; and pr colonoscopy flx dx w/collj spec when pfrmd (N/A, 3/27/2019)  His family history includes Atrial fibrillation in his father; Breast cancer in his mother; Coronary artery disease in his mother; Diabetes in his mother; Hyperlipidemia in his mother  He  reports that he has never smoked  He has never used smokeless tobacco  He reports that he does not drink alcohol or use drugs  Current Outpatient Medications   Medication Sig Dispense Refill    albuterol (VENTOLIN HFA) 90 mcg/act inhaler Inhale 2 puffs every 6 (six) hours as needed for wheezing 1 Inhaler 5    aspirin 81 MG tablet Take 81 mg by mouth daily        cetirizine (ZyrTEC) 10 mg tablet Take 1 tablet (10 mg total) by mouth daily 30 tablet 3    Cholecalciferol (VITAMIN D3) 02484 units CAPS Take 1 capsule (50,000 Units total) by mouth once a week 12 capsule 1    ezetimibe (ZETIA) 10 mg tablet       Garlic Oil 8573 MG CAPS Take 2 capsules by mouth daily      nitroglycerin (NITROSTAT) 0 4 mg SL tablet Place 1 tablet (0 4 mg total) under the tongue every 5 (five) minutes as needed for chest pain 25 tablet 3    rosuvastatin (CRESTOR) 20 MG tablet Take 1 tablet (20 mg total) by mouth daily 90 tablet 3    benzonatate (TESSALON) 200 MG capsule Take 1 capsule (200 mg total) by mouth 3 (three) times a day as needed for cough 30 capsule 0    doxycycline monohydrate (MONODOX) 100 mg capsule Take 1 capsule (100 mg total) by mouth 2 (two) times a day for 10 days 20 capsule 0    predniSONE 10 mg tablet 30 mg by mouth daily for 3 days, then 20 mg by mouth daily for 3 days, then 10 mg by mouth daily for 3 days, then stop 18 tablet 0     No current facility-administered medications for this visit  Current Outpatient Medications on File Prior to Visit   Medication Sig    albuterol (VENTOLIN HFA) 90 mcg/act inhaler Inhale 2 puffs every 6 (six) hours as needed for wheezing    aspirin 81 MG tablet Take 81 mg by mouth daily   cetirizine (ZyrTEC) 10 mg tablet Take 1 tablet (10 mg total) by mouth daily    Cholecalciferol (VITAMIN D3) 14867 units CAPS Take 1 capsule (50,000 Units total) by mouth once a week    ezetimibe (ZETIA) 10 mg tablet     Garlic Oil 8196 MG CAPS Take 2 capsules by mouth daily    nitroglycerin (NITROSTAT) 0 4 mg SL tablet Place 1 tablet (0 4 mg total) under the tongue every 5 (five) minutes as needed for chest pain    rosuvastatin (CRESTOR) 20 MG tablet Take 1 tablet (20 mg total) by mouth daily     No current facility-administered medications on file prior to visit  He is allergic to erythromycin base; other; oxycodone-acetaminophen; percocet [oxycodone-acetaminophen]; and penicillins       Review of Systems   Constitutional: Negative  HENT: Positive for congestion and postnasal drip  Eyes: Negative  Respiratory: Positive for cough  Cardiovascular: Negative  Gastrointestinal: Negative  Endocrine: Negative  Genitourinary: Negative  Musculoskeletal: Negative  Skin: Negative  Allergic/Immunologic: Negative  Neurological: Negative  Hematological: Negative  Psychiatric/Behavioral: Negative            Objective:      /80 (BP Location: Right arm, Patient Position: Sitting, Cuff Size: Large)   Pulse 80   Temp (!) 97 1 °F (36 2 °C) (Temporal)   Resp 18   Ht 5' 9" (1 753 m)   Wt 118 kg (260 lb 4 8 oz)   SpO2 100%   BMI 38 44 kg/m²          Physical Exam   Constitutional: He is oriented to person, place, and time  He appears well-developed and well-nourished  HENT:   Head: Normocephalic and atraumatic  Right Ear: External ear normal    Left Ear: External ear normal    Mouth/Throat: Oropharynx is clear and moist    BL turbinates red and edematous   Eyes: Pupils are equal, round, and reactive to light  Conjunctivae and EOM are normal    Neck: Normal range of motion  Neck supple  Cardiovascular: Normal rate, regular rhythm, normal heart sounds and intact distal pulses  Pulmonary/Chest: Effort normal and breath sounds normal    Abdominal: Soft  Bowel sounds are normal    Musculoskeletal: Normal range of motion  Neurological: He is alert and oriented to person, place, and time  Skin: Skin is warm and dry  Capillary refill takes less than 2 seconds  Psychiatric: He has a normal mood and affect  His behavior is normal  Judgment and thought content normal    Vitals reviewed  BMI Counseling: Body mass index is 38 44 kg/m²  The BMI is above normal  Nutrition recommendations include reducing portion sizes, decreasing overall calorie intake, 3-5 servings of fruits/vegetables daily, reducing fast food intake, consuming healthier snacks, decreasing soda and/or juice intake, moderation in carbohydrate intake, increasing intake of lean protein, reducing intake of saturated fat and trans fat and reducing intake of cholesterol

## 2020-03-19 NOTE — PATIENT INSTRUCTIONS

## 2020-04-02 DIAGNOSIS — E55.9 VITAMIN D DEFICIENCY: ICD-10-CM

## 2020-04-02 RX ORDER — CHOLECALCIFEROL (VITAMIN D3) 1250 MCG
1 CAPSULE ORAL WEEKLY
Qty: 12 CAPSULE | Refills: 3 | Status: SHIPPED | OUTPATIENT
Start: 2020-04-02 | End: 2020-04-16 | Stop reason: SDUPTHER

## 2020-04-16 DIAGNOSIS — E55.9 VITAMIN D DEFICIENCY: ICD-10-CM

## 2020-04-16 RX ORDER — CHOLECALCIFEROL (VITAMIN D3) 1250 MCG
1 CAPSULE ORAL WEEKLY
Qty: 12 CAPSULE | Refills: 3 | Status: SHIPPED | OUTPATIENT
Start: 2020-04-16 | End: 2021-06-18 | Stop reason: SDUPTHER

## 2020-06-11 ENCOUNTER — TELEPHONE (OUTPATIENT)
Dept: INTERNAL MEDICINE CLINIC | Facility: CLINIC | Age: 65
End: 2020-06-11

## 2020-06-11 DIAGNOSIS — M25.511 ACUTE PAIN OF BOTH SHOULDERS: Primary | ICD-10-CM

## 2020-06-11 DIAGNOSIS — M25.512 ACUTE PAIN OF BOTH SHOULDERS: Primary | ICD-10-CM

## 2020-06-11 RX ORDER — METHYLPREDNISOLONE 4 MG/1
TABLET ORAL
Qty: 21 EACH | Refills: 0 | Status: SHIPPED | OUTPATIENT
Start: 2020-06-11 | End: 2020-08-21

## 2020-08-17 ENCOUNTER — APPOINTMENT (OUTPATIENT)
Dept: LAB | Facility: MEDICAL CENTER | Age: 65
End: 2020-08-17
Payer: MEDICARE

## 2020-08-17 DIAGNOSIS — I26.99 IATROGENIC PULMONARY EMBOLISM AND INFARCTION, SEQUELA (HCC): ICD-10-CM

## 2020-08-17 DIAGNOSIS — I10 BENIGN ESSENTIAL HYPERTENSION: ICD-10-CM

## 2020-08-17 DIAGNOSIS — T81.718S IATROGENIC PULMONARY EMBOLISM AND INFARCTION, SEQUELA (HCC): ICD-10-CM

## 2020-08-17 DIAGNOSIS — M47.817 LUMBOSACRAL SPONDYLOSIS WITHOUT MYELOPATHY: ICD-10-CM

## 2020-08-17 DIAGNOSIS — I25.10 ATHEROSCLEROSIS OF NATIVE CORONARY ARTERY OF NATIVE HEART WITHOUT ANGINA PECTORIS: ICD-10-CM

## 2020-08-17 DIAGNOSIS — E55.9 VITAMIN D DEFICIENCY: ICD-10-CM

## 2020-08-17 DIAGNOSIS — E78.2 MIXED HYPERLIPIDEMIA: ICD-10-CM

## 2020-08-17 LAB
25(OH)D3 SERPL-MCNC: 52 NG/ML (ref 30–100)
ALBUMIN SERPL BCP-MCNC: 3.6 G/DL (ref 3.5–5)
ALP SERPL-CCNC: 38 U/L (ref 46–116)
ALT SERPL W P-5'-P-CCNC: 36 U/L (ref 12–78)
ANION GAP SERPL CALCULATED.3IONS-SCNC: 5 MMOL/L (ref 4–13)
AST SERPL W P-5'-P-CCNC: 26 U/L (ref 5–45)
BASOPHILS # BLD AUTO: 0.02 THOUSANDS/ΜL (ref 0–0.1)
BASOPHILS NFR BLD AUTO: 0 % (ref 0–1)
BILIRUB SERPL-MCNC: 1.28 MG/DL (ref 0.2–1)
BUN SERPL-MCNC: 22 MG/DL (ref 5–25)
CALCIUM SERPL-MCNC: 8.9 MG/DL (ref 8.3–10.1)
CHLORIDE SERPL-SCNC: 105 MMOL/L (ref 100–108)
CHOLEST SERPL-MCNC: 154 MG/DL (ref 50–200)
CO2 SERPL-SCNC: 28 MMOL/L (ref 21–32)
CREAT SERPL-MCNC: 1.14 MG/DL (ref 0.6–1.3)
EOSINOPHIL # BLD AUTO: 0.09 THOUSAND/ΜL (ref 0–0.61)
EOSINOPHIL NFR BLD AUTO: 1 % (ref 0–6)
ERYTHROCYTE [DISTWIDTH] IN BLOOD BY AUTOMATED COUNT: 13.6 % (ref 11.6–15.1)
GFR SERPL CREATININE-BSD FRML MDRD: 67 ML/MIN/1.73SQ M
GLUCOSE P FAST SERPL-MCNC: 109 MG/DL (ref 65–99)
HCT VFR BLD AUTO: 49.7 % (ref 36.5–49.3)
HDLC SERPL-MCNC: 51 MG/DL
HGB BLD-MCNC: 16.8 G/DL (ref 12–17)
IMM GRANULOCYTES # BLD AUTO: 0.11 THOUSAND/UL (ref 0–0.2)
IMM GRANULOCYTES NFR BLD AUTO: 1 % (ref 0–2)
LDLC SERPL CALC-MCNC: 77 MG/DL (ref 0–100)
LYMPHOCYTES # BLD AUTO: 2.77 THOUSANDS/ΜL (ref 0.6–4.47)
LYMPHOCYTES NFR BLD AUTO: 24 % (ref 14–44)
MCH RBC QN AUTO: 30.4 PG (ref 26.8–34.3)
MCHC RBC AUTO-ENTMCNC: 33.8 G/DL (ref 31.4–37.4)
MCV RBC AUTO: 90 FL (ref 82–98)
MONOCYTES # BLD AUTO: 0.78 THOUSAND/ΜL (ref 0.17–1.22)
MONOCYTES NFR BLD AUTO: 7 % (ref 4–12)
NEUTROPHILS # BLD AUTO: 7.78 THOUSANDS/ΜL (ref 1.85–7.62)
NEUTS SEG NFR BLD AUTO: 67 % (ref 43–75)
NONHDLC SERPL-MCNC: 103 MG/DL
NRBC BLD AUTO-RTO: 0 /100 WBCS
PLATELET # BLD AUTO: 231 THOUSANDS/UL (ref 149–390)
PMV BLD AUTO: 11.3 FL (ref 8.9–12.7)
POTASSIUM SERPL-SCNC: 4.8 MMOL/L (ref 3.5–5.3)
PROT SERPL-MCNC: 6.8 G/DL (ref 6.4–8.2)
RBC # BLD AUTO: 5.52 MILLION/UL (ref 3.88–5.62)
SODIUM SERPL-SCNC: 138 MMOL/L (ref 136–145)
TRIGL SERPL-MCNC: 128 MG/DL
TSH SERPL DL<=0.05 MIU/L-ACNC: 2.21 UIU/ML (ref 0.36–3.74)
WBC # BLD AUTO: 11.55 THOUSAND/UL (ref 4.31–10.16)

## 2020-08-17 PROCEDURE — 84443 ASSAY THYROID STIM HORMONE: CPT

## 2020-08-17 PROCEDURE — 80061 LIPID PANEL: CPT

## 2020-08-17 PROCEDURE — 82306 VITAMIN D 25 HYDROXY: CPT

## 2020-08-17 PROCEDURE — 80053 COMPREHEN METABOLIC PANEL: CPT

## 2020-08-17 PROCEDURE — 85025 COMPLETE CBC W/AUTO DIFF WBC: CPT

## 2020-08-17 PROCEDURE — 36415 COLL VENOUS BLD VENIPUNCTURE: CPT

## 2020-08-21 ENCOUNTER — OFFICE VISIT (OUTPATIENT)
Dept: INTERNAL MEDICINE CLINIC | Facility: CLINIC | Age: 65
End: 2020-08-21
Payer: MEDICARE

## 2020-08-21 VITALS
BODY MASS INDEX: 37.64 KG/M2 | HEART RATE: 62 BPM | SYSTOLIC BLOOD PRESSURE: 124 MMHG | WEIGHT: 254.1 LBS | DIASTOLIC BLOOD PRESSURE: 76 MMHG | OXYGEN SATURATION: 98 % | TEMPERATURE: 97.2 F | HEIGHT: 69 IN

## 2020-08-21 DIAGNOSIS — M47.817 LUMBOSACRAL SPONDYLOSIS WITHOUT MYELOPATHY: ICD-10-CM

## 2020-08-21 DIAGNOSIS — T81.718S IATROGENIC PULMONARY EMBOLISM AND INFARCTION, SEQUELA (HCC): Primary | ICD-10-CM

## 2020-08-21 DIAGNOSIS — E78.2 MIXED HYPERLIPIDEMIA: ICD-10-CM

## 2020-08-21 DIAGNOSIS — I25.10 ATHEROSCLEROSIS OF NATIVE CORONARY ARTERY OF NATIVE HEART WITHOUT ANGINA PECTORIS: ICD-10-CM

## 2020-08-21 DIAGNOSIS — I26.99 IATROGENIC PULMONARY EMBOLISM AND INFARCTION, SEQUELA (HCC): Primary | ICD-10-CM

## 2020-08-21 PROBLEM — Z00.00 MEDICARE ANNUAL WELLNESS VISIT, INITIAL: Status: RESOLVED | Noted: 2019-09-20 | Resolved: 2020-08-21

## 2020-08-21 PROBLEM — Z00.00 MEDICARE ANNUAL WELLNESS VISIT, SUBSEQUENT: Status: RESOLVED | Noted: 2019-09-20 | Resolved: 2020-08-21

## 2020-08-21 PROBLEM — R05.9 COUGH: Status: RESOLVED | Noted: 2019-09-20 | Resolved: 2020-08-21

## 2020-08-21 PROBLEM — Z13.29 SCREENING FOR THYROID DISORDER: Status: RESOLVED | Noted: 2019-09-20 | Resolved: 2020-08-21

## 2020-08-21 PROCEDURE — 99214 OFFICE O/P EST MOD 30 MIN: CPT | Performed by: NURSE PRACTITIONER

## 2020-08-21 PROCEDURE — 1036F TOBACCO NON-USER: CPT | Performed by: NURSE PRACTITIONER

## 2020-08-21 PROCEDURE — 3008F BODY MASS INDEX DOCD: CPT | Performed by: NURSE PRACTITIONER

## 2020-08-21 PROCEDURE — 1160F RVW MEDS BY RX/DR IN RCRD: CPT | Performed by: NURSE PRACTITIONER

## 2020-08-21 PROCEDURE — 3078F DIAST BP <80 MM HG: CPT | Performed by: NURSE PRACTITIONER

## 2020-08-21 PROCEDURE — 3074F SYST BP LT 130 MM HG: CPT | Performed by: NURSE PRACTITIONER

## 2020-08-21 PROCEDURE — 4040F PNEUMOC VAC/ADMIN/RCVD: CPT | Performed by: NURSE PRACTITIONER

## 2020-08-21 NOTE — PROGRESS NOTES
Assessment/Plan: Patient is following up with Cardiology and did have recent stress test done which was normal  He did see them in December and is now taking Zetia  He is taking Crestor 20 mg and can not tolerate 40 mg  Last lab work did show his LDL is at 77 and triglycerides 108  Vitamin D still low at 52  He is deferring vaccines at this time  /76  Recent colonoscopy did show internal hemorrhoids with no polyps  Other screenings are up to date  Labs reviewed with patient and normal  Patient is deferring to follow up with Pulmonary at this time and is deferring a sleep study  Will repeat fasting labs as well  Continue to follow up with [facility] and is having surgery in November to his right shoulder  Will follow up in 6 months or sooner if need be  No problem-specific Assessment & Plan notes found for this encounter  Problem List Items Addressed This Visit        Respiratory    Iatrogenic pulmonary embolism and infarction [facility]) - Primary    Relevant Orders    Comprehensive metabolic panel    CBC and differential    TSH, 3rd generation with Free T4 reflex       Cardiovascular and Mediastinum    Atherosclerotic heart disease of native coronary artery without angina pectoris    Relevant Orders    Comprehensive metabolic panel    CBC and differential    TSH, 3rd generation with Free T4 reflex       Musculoskeletal and Integument    Lumbosacral spondylosis without myelopathy    Relevant Orders    Comprehensive metabolic panel    CBC and differential    TSH, 3rd generation with Free T4 reflex       Other    Hyperlipidemia    Relevant Orders    Comprehensive metabolic panel    CBC and differential    TSH, 3rd generation with Free T4 reflex    Lipid panel            Subjective:      Patient ID: Adelia Carreno is a 76 y o  male  Juan Herrera is here today for a follow up visit   He is seeing Cardiology for a  past medical history of coronary disease status post non-STEMI status post drug-eluting stenting x2 in September 2010 with residual moderate left circumflex disease, hypertension, dyslipidemia, 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, reported nocturnal bradycardia  He did follow up in July and no changes were made  He is seeing [facility] and is having surgery on his shoulder in November  He states he did have an injection and his arm was feeling better but is starting with pain again  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 and it will be over then  He denies any chest pain, SOB, or palpitations  He denies any depression or anxiety  He otherwise is doing well  The following portions of the patient's history were reviewed and updated as appropriate:   He  has a past medical history of Cardiac disease, Hypertension, and Myocardial infarction ([facility] [address] )  He   Patient Active Problem List    Diagnosis Date Noted    BMI 38 0-38 9,adult 03/19/2020    Bronchitis 03/19/2020    Right wrist pain 09/20/2019    Iatrogenic pulmonary embolism and infarction [facility]) 09/20/2019    Colon cancer screening 03/07/2019    Acquired scoliosis 03/05/2019    Carotid artery occlusion 03/05/2019    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 03/05/2019    Lumbosacral spondylosis without myelopathy 03/05/2019    Old intraocular magnetic foreign body 03/05/2019    Overweight 09/04/2018    Pure hypercholesterolemia 09/04/2018    S/P coronary artery stent placement 09/04/2018    Joint inflammation 05/30/2017    Localized primary osteoarthritis of wrist 02/01/2017    Vitamin D deficiency 03/06/2015    Anxiety 05/21/2013    Atherosclerotic heart disease of native coronary artery without angina pectoris 11/29/2012    Hyperlipidemia 11/29/2012    Coronary atherosclerosis 11/17/2010    Benign essential hypertension 11/17/2010    Osteoarthritis of hip 65/11/0505    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[facility] [address] ) 11/11/2008     He  has a past surgical history that includes Coronary stent placement;  Total hip arthroplasty; Hernia repair; Other surgical history (09/18/2010); Tonsillectomy and adenoidectomy; and pr colonoscopy flx dx w/collj spec when pfrmd (N/A, 3/27/2019)  His family history includes Atrial fibrillation in his father; Breast cancer in his mother; Coronary artery disease in his mother; Diabetes in his mother; Hyperlipidemia in his mother  He  reports that he has never smoked  He has never used smokeless tobacco  He reports that he does not drink alcohol or use drugs  Current Outpatient Medications   Medication Sig Dispense Refill    albuterol (VENTOLIN HFA) 90 mcg/act inhaler Inhale 2 puffs every 6 (six) hours as needed for wheezing 1 Inhaler 5    aspirin 81 MG tablet Take 81 mg by mouth daily   benzonatate (TESSALON) 200 MG capsule Take 1 capsule (200 mg total) by mouth 3 (three) times a day as needed for cough 30 capsule 0    cetirizine (ZyrTEC) 10 mg tablet Take 1 tablet (10 mg total) by mouth daily 30 tablet 3    Cholecalciferol (VITAMIN D3) 1 25 MG (19545 UT) CAPS Take 1 capsule (50,000 Units total) by mouth once a week 12 capsule 3    ezetimibe (ZETIA) 10 mg tablet       Garlic Oil 4434 MG CAPS Take 2 capsules by mouth daily      nitroglycerin (NITROSTAT) 0 4 mg SL tablet Place 1 tablet (0 4 mg total) under the tongue every 5 (five) minutes as needed for chest pain 25 tablet 3    rosuvastatin (CRESTOR) 20 MG tablet Take 1 tablet (20 mg total) by mouth daily 90 tablet 3     No current facility-administered medications for this visit  Current Outpatient Medications on File Prior to Visit   Medication Sig    albuterol (VENTOLIN HFA) 90 mcg/act inhaler Inhale 2 puffs every 6 (six) hours as needed for wheezing    aspirin 81 MG tablet Take 81 mg by mouth daily      benzonatate (TESSALON) 200 MG capsule Take 1 capsule (200 mg total) by mouth 3 (three) times a day as needed for cough    cetirizine (ZyrTEC) 10 mg tablet Take 1 tablet (10 mg total) by mouth daily    Cholecalciferol (VITAMIN D3) 1 25 MG (29642 UT) CAPS Take 1 capsule (50,000 Units total) by mouth once a week    ezetimibe (ZETIA) 10 mg tablet     Garlic Oil 3646 MG CAPS Take 2 capsules by mouth daily    nitroglycerin (NITROSTAT) 0 4 mg SL tablet Place 1 tablet (0 4 mg total) under the tongue every 5 (five) minutes as needed for chest pain    rosuvastatin (CRESTOR) 20 MG tablet Take 1 tablet (20 mg total) by mouth daily    [DISCONTINUED] methylPREDNISolone 4 MG tablet therapy pack Use as directed on package    [DISCONTINUED] predniSONE 10 mg tablet 30 mg by mouth daily for 3 days, then 20 mg by mouth daily for 3 days, then 10 mg by mouth daily for 3 days, then stop (Patient not taking: Reported on 8/21/2020)     No current facility-administered medications on file prior to visit  He is allergic to erythromycin base; other; oxycodone-acetaminophen; percocet [oxycodone-acetaminophen]; and penicillins       Review of Systems   Constitutional: Negative  HENT: Negative  Eyes: Negative  Respiratory: Negative  Cardiovascular: Negative  Gastrointestinal: Negative  Endocrine: Negative  Genitourinary: Negative  Musculoskeletal: Positive for arthralgias and myalgias  Skin: Negative  Allergic/Immunologic: Negative  Neurological: Negative  Hematological: Negative  Psychiatric/Behavioral: Negative  Below is the patient's most recent value for Albumin, ALT, AST, BUN, Calcium, Chloride, Cholesterol, CO2, Creatinine, GFR, Glucose, HDL, Hematocrit, Hemoglobin, Hemoglobin A1C, LDL, Magnesium, Phosphorus, Platelets, Potassium, PSA, Sodium, Triglycerides, and WBC     Lab Results   Component Value Date    ALT 36 08/17/2020    AST 26 08/17/2020    BUN 22 08/17/2020    CALCIUM 8 9 08/17/2020     08/17/2020    CHOL 173 03/26/2015    CO2 28 08/17/2020    CREATININE 1 14 08/17/2020    HDL 51 08/17/2020    HCT 49 7 (H) 08/17/2020    HGB 16 8 08/17/2020    MG 2 3 03/19/2018    PLT 231 08/17/2020    K 4 8 08/17/2020    PSA 0 6 03/19/2018     (L) 04/02/2015    TRIG 128 08/17/2020    WBC 11 55 (H) 08/17/2020     Note: for a comprehensive list of the patient's lab results, access the Results Review activity  Objective:      /76 (BP Location: Right arm, Patient Position: Sitting, Cuff Size: Large)   Pulse 62   Temp (!) 97 2 °F (36 2 °C) (Temporal)   Ht 5' 9" (1 753 m)   Wt 115 kg (254 lb 1 6 oz)   SpO2 98%   BMI 37 52 kg/m²          Physical Exam  Vitals signs reviewed  Constitutional:       Appearance: Normal appearance  He is normal weight  HENT:      Head: Normocephalic and atraumatic  Right Ear: Tympanic membrane, ear canal and external ear normal       Left Ear: Tympanic membrane, ear canal and external ear normal       Nose: Nose normal       Mouth/Throat:      Mouth: Mucous membranes are moist       Pharynx: Oropharynx is clear  Eyes:      Extraocular Movements: Extraocular movements intact  Conjunctiva/sclera: Conjunctivae normal       Pupils: Pupils are equal, round, and reactive to light  Neck:      Musculoskeletal: Normal range of motion and neck supple  Cardiovascular:      Rate and Rhythm: Normal rate and regular rhythm  Pulses: Normal pulses  Heart sounds: Normal heart sounds  Pulmonary:      Effort: Pulmonary effort is normal       Breath sounds: Normal breath sounds  Abdominal:      General: Abdomen is flat  Bowel sounds are normal       Palpations: Abdomen is soft  Musculoskeletal: Normal range of motion  Skin:     General: Skin is warm and dry  Capillary Refill: Capillary refill takes less than 2 seconds  Neurological:      General: No focal deficit present  Mental Status: He is alert and oriented to person, place, and time  Mental status is at baseline  Psychiatric:         Mood and Affect: Mood normal          Behavior: Behavior normal          Thought Content:  Thought content normal          Judgment: Judgment normal

## 2020-11-06 ENCOUNTER — OFFICE VISIT (OUTPATIENT)
Dept: INTERNAL MEDICINE CLINIC | Facility: CLINIC | Age: 65
End: 2020-11-06
Payer: MEDICARE

## 2020-11-06 VITALS
HEIGHT: 69 IN | DIASTOLIC BLOOD PRESSURE: 72 MMHG | TEMPERATURE: 96.3 F | HEART RATE: 58 BPM | SYSTOLIC BLOOD PRESSURE: 122 MMHG | BODY MASS INDEX: 38.63 KG/M2 | OXYGEN SATURATION: 98 % | WEIGHT: 260.8 LBS

## 2020-11-06 DIAGNOSIS — Z00.00 MEDICARE ANNUAL WELLNESS VISIT, SUBSEQUENT: Primary | ICD-10-CM

## 2020-11-06 DIAGNOSIS — M75.41 IMPINGEMENT SYNDROME OF RIGHT SHOULDER: ICD-10-CM

## 2020-11-06 DIAGNOSIS — E78.2 MIXED HYPERLIPIDEMIA: ICD-10-CM

## 2020-11-06 DIAGNOSIS — Z23 NEED FOR INFLUENZA VACCINATION: ICD-10-CM

## 2020-11-06 DIAGNOSIS — Z01.818 PREOPERATIVE CLEARANCE: ICD-10-CM

## 2020-11-06 PROCEDURE — 90662 IIV NO PRSV INCREASED AG IM: CPT | Performed by: NURSE PRACTITIONER

## 2020-11-06 PROCEDURE — G0439 PPPS, SUBSEQ VISIT: HCPCS | Performed by: NURSE PRACTITIONER

## 2020-11-06 PROCEDURE — G0008 ADMIN INFLUENZA VIRUS VAC: HCPCS | Performed by: NURSE PRACTITIONER

## 2020-11-06 PROCEDURE — 99213 OFFICE O/P EST LOW 20 MIN: CPT | Performed by: NURSE PRACTITIONER

## 2020-11-06 RX ORDER — EZETIMIBE 10 MG/1
10 TABLET ORAL DAILY
Qty: 90 TABLET | Refills: 3 | Status: SHIPPED | OUTPATIENT
Start: 2020-11-06 | End: 2021-02-23 | Stop reason: SDUPTHER

## 2020-11-06 RX ORDER — ROSUVASTATIN CALCIUM 20 MG/1
20 TABLET, COATED ORAL DAILY
Qty: 90 TABLET | Refills: 3 | Status: SHIPPED | OUTPATIENT
Start: 2020-11-06 | End: 2021-02-23 | Stop reason: SDUPTHER

## 2020-12-04 ENCOUNTER — EVALUATION (OUTPATIENT)
Dept: PHYSICAL THERAPY | Facility: CLINIC | Age: 65
End: 2020-12-04
Payer: MEDICARE

## 2020-12-04 ENCOUNTER — TRANSCRIBE ORDERS (OUTPATIENT)
Dept: PHYSICAL THERAPY | Facility: CLINIC | Age: 65
End: 2020-12-04

## 2020-12-04 DIAGNOSIS — M75.41 IMPINGEMENT SYNDROME OF RIGHT SHOULDER: Primary | ICD-10-CM

## 2020-12-04 PROCEDURE — 97140 MANUAL THERAPY 1/> REGIONS: CPT | Performed by: PHYSICAL THERAPIST

## 2020-12-04 PROCEDURE — 97163 PT EVAL HIGH COMPLEX 45 MIN: CPT | Performed by: PHYSICAL THERAPIST

## 2020-12-04 PROCEDURE — 97535 SELF CARE MNGMENT TRAINING: CPT | Performed by: PHYSICAL THERAPIST

## 2020-12-07 ENCOUNTER — OFFICE VISIT (OUTPATIENT)
Dept: PHYSICAL THERAPY | Facility: CLINIC | Age: 65
End: 2020-12-07
Payer: MEDICARE

## 2020-12-07 DIAGNOSIS — M75.41 IMPINGEMENT SYNDROME OF RIGHT SHOULDER: Primary | ICD-10-CM

## 2020-12-07 PROCEDURE — 97140 MANUAL THERAPY 1/> REGIONS: CPT | Performed by: PHYSICAL THERAPIST

## 2020-12-07 PROCEDURE — 97110 THERAPEUTIC EXERCISES: CPT | Performed by: PHYSICAL THERAPIST

## 2020-12-09 ENCOUNTER — OFFICE VISIT (OUTPATIENT)
Dept: PHYSICAL THERAPY | Facility: CLINIC | Age: 65
End: 2020-12-09
Payer: MEDICARE

## 2020-12-09 DIAGNOSIS — M75.41 IMPINGEMENT SYNDROME OF RIGHT SHOULDER: Primary | ICD-10-CM

## 2020-12-09 PROCEDURE — 97110 THERAPEUTIC EXERCISES: CPT

## 2020-12-09 PROCEDURE — 97140 MANUAL THERAPY 1/> REGIONS: CPT

## 2020-12-14 ENCOUNTER — TELEPHONE (OUTPATIENT)
Dept: INTERNAL MEDICINE CLINIC | Facility: CLINIC | Age: 65
End: 2020-12-14

## 2020-12-14 DIAGNOSIS — R05.9 COUGH: Primary | ICD-10-CM

## 2020-12-14 RX ORDER — PROMETHAZINE HYDROCHLORIDE AND CODEINE PHOSPHATE 6.25; 1 MG/5ML; MG/5ML
5 SYRUP ORAL EVERY 4 HOURS PRN
Qty: 240 ML | Refills: 0 | Status: SHIPPED | OUTPATIENT
Start: 2020-12-14 | End: 2020-12-29

## 2020-12-14 NOTE — TELEPHONE ENCOUNTER
Patients wife calling saying that Dorothy Carmen has a horrible dry cough, denies fever/SOB/chest discomfort  She stated that patient does not have an issue taking codeine and has taken it in the past  She states that percocet does cause him to hallucinate  Per your suggestion they will try humidifier  They will also call back tomorrow if he is any worse

## 2020-12-15 ENCOUNTER — HOSPITAL ENCOUNTER (EMERGENCY)
Facility: HOSPITAL | Age: 65
Discharge: HOME | End: 2020-12-15
Attending: EMERGENCY MEDICINE | Admitting: EMERGENCY MEDICINE
Payer: MEDICARE

## 2020-12-15 ENCOUNTER — APPOINTMENT (EMERGENCY)
Dept: CT IMAGING | Facility: HOSPITAL | Age: 65
End: 2020-12-15
Payer: MEDICARE

## 2020-12-15 ENCOUNTER — HOSPITAL ENCOUNTER (OUTPATIENT)
Dept: RADIOLOGY | Facility: HOSPITAL | Age: 65
Discharge: HOME | End: 2020-12-15
Payer: MEDICARE

## 2020-12-15 ENCOUNTER — TELEPHONE (OUTPATIENT)
Dept: INTERNAL MEDICINE CLINIC | Facility: CLINIC | Age: 65
End: 2020-12-15

## 2020-12-15 ENCOUNTER — LAB (OUTPATIENT)
Dept: LAB | Facility: HOSPITAL | Age: 65
End: 2020-12-15
Payer: MEDICARE

## 2020-12-15 ENCOUNTER — TELEMEDICINE (OUTPATIENT)
Dept: INTERNAL MEDICINE CLINIC | Facility: CLINIC | Age: 65
End: 2020-12-15
Payer: MEDICARE

## 2020-12-15 VITALS
SYSTOLIC BLOOD PRESSURE: 130 MMHG | BODY MASS INDEX: 38.11 KG/M2 | HEART RATE: 99 BPM | OXYGEN SATURATION: 95 % | RESPIRATION RATE: 16 BRPM | TEMPERATURE: 100.1 F | WEIGHT: 257.28 LBS | HEIGHT: 69 IN | DIASTOLIC BLOOD PRESSURE: 67 MMHG

## 2020-12-15 VITALS — HEART RATE: 114 BPM | TEMPERATURE: 99 F | OXYGEN SATURATION: 95 %

## 2020-12-15 DIAGNOSIS — R05.9 COUGH: ICD-10-CM

## 2020-12-15 DIAGNOSIS — U07.1 COVID-19: Primary | ICD-10-CM

## 2020-12-15 DIAGNOSIS — Z20.822 EXPOSURE TO COVID-19 VIRUS: ICD-10-CM

## 2020-12-15 DIAGNOSIS — Z20.822 EXPOSURE TO COVID-19 VIRUS: Primary | ICD-10-CM

## 2020-12-15 DIAGNOSIS — R06.02 SOB (SHORTNESS OF BREATH): ICD-10-CM

## 2020-12-15 LAB
ALBUMIN SERPL BCP-MCNC: 3.2 G/DL (ref 3.5–5)
ALP SERPL-CCNC: 47 U/L (ref 46–116)
ALT SERPL W P-5'-P-CCNC: 41 U/L (ref 12–78)
ANION GAP SERPL CALCULATED.3IONS-SCNC: 11 MMOL/L (ref 4–13)
APTT PPP: 36 SECONDS (ref 23–37)
AST SERPL W P-5'-P-CCNC: 55 U/L (ref 5–45)
BASOPHILS # BLD AUTO: 0.02 THOUSANDS/ΜL (ref 0–0.1)
BASOPHILS NFR BLD AUTO: 0 % (ref 0–1)
BILIRUB DIRECT SERPL-MCNC: 0.07 MG/DL (ref 0–0.2)
BILIRUB SERPL-MCNC: 0.7 MG/DL (ref 0.2–1)
BILIRUB UR QL STRIP: NEGATIVE
BUN SERPL-MCNC: 17 MG/DL (ref 5–25)
CALCIUM SERPL-MCNC: 8.3 MG/DL (ref 8.3–10.1)
CHLORIDE SERPL-SCNC: 96 MMOL/L (ref 100–108)
CLARITY UR: CLEAR
CO2 SERPL-SCNC: 25 MMOL/L (ref 21–32)
COLOR UR: YELLOW
CREAT SERPL-MCNC: 1.28 MG/DL (ref 0.6–1.3)
D DIMER PPP FEU-MCNC: 2.73 UG/ML FEU
EOSINOPHIL # BLD AUTO: 0.15 THOUSAND/ΜL (ref 0–0.61)
EOSINOPHIL NFR BLD AUTO: 3 % (ref 0–6)
ERYTHROCYTE [DISTWIDTH] IN BLOOD BY AUTOMATED COUNT: 12.1 % (ref 11.6–15.1)
FLUAV RNA RESP QL NAA+PROBE: NEGATIVE
FLUBV RNA RESP QL NAA+PROBE: NEGATIVE
GFR SERPL CREATININE-BSD FRML MDRD: 58 ML/MIN/1.73SQ M
GLUCOSE SERPL-MCNC: 110 MG/DL (ref 65–140)
GLUCOSE UR STRIP-MCNC: NEGATIVE MG/DL
HCT VFR BLD AUTO: 46.1 % (ref 36.5–49.3)
HGB BLD-MCNC: 15.7 G/DL (ref 12–17)
HGB UR QL STRIP.AUTO: NEGATIVE
IMM GRANULOCYTES # BLD AUTO: 0.02 THOUSAND/UL (ref 0–0.2)
IMM GRANULOCYTES NFR BLD AUTO: 0 % (ref 0–2)
INR PPP: 1.03 (ref 0.84–1.19)
KETONES UR STRIP-MCNC: NEGATIVE MG/DL
LEUKOCYTE ESTERASE UR QL STRIP: NEGATIVE
LYMPHOCYTES # BLD AUTO: 1.99 THOUSANDS/ΜL (ref 0.6–4.47)
LYMPHOCYTES NFR BLD AUTO: 36 % (ref 14–44)
MCH RBC QN AUTO: 30.4 PG (ref 26.8–34.3)
MCHC RBC AUTO-ENTMCNC: 34.1 G/DL (ref 31.4–37.4)
MCV RBC AUTO: 89 FL (ref 82–98)
MONOCYTES # BLD AUTO: 0.59 THOUSAND/ΜL (ref 0.17–1.22)
MONOCYTES NFR BLD AUTO: 11 % (ref 4–12)
NEUTROPHILS # BLD AUTO: 2.83 THOUSANDS/ΜL (ref 1.85–7.62)
NEUTS SEG NFR BLD AUTO: 50 % (ref 43–75)
NITRITE UR QL STRIP: NEGATIVE
NRBC BLD AUTO-RTO: 0 /100 WBCS
PH UR STRIP.AUTO: 5.5 [PH]
PLATELET # BLD AUTO: 180 THOUSANDS/UL (ref 149–390)
PMV BLD AUTO: 10.4 FL (ref 8.9–12.7)
POTASSIUM SERPL-SCNC: 4.7 MMOL/L (ref 3.5–5.3)
PROT SERPL-MCNC: 6.9 G/DL (ref 6.4–8.2)
PROT UR STRIP-MCNC: NEGATIVE MG/DL
PROTHROMBIN TIME: 13.3 SECONDS (ref 11.6–14.5)
RBC # BLD AUTO: 5.17 MILLION/UL (ref 3.88–5.62)
RSV RNA RESP QL NAA+PROBE: NEGATIVE
SARS-COV-2 RNA RESP QL NAA+PROBE: POSITIVE
SODIUM SERPL-SCNC: 132 MMOL/L (ref 136–145)
SP GR UR STRIP.AUTO: 1.02 (ref 1–1.03)
TROPONIN I SERPL-MCNC: <0.02 NG/ML
UROBILINOGEN UR QL STRIP.AUTO: 0.2 E.U./DL
WBC # BLD AUTO: 5.6 THOUSAND/UL (ref 4.31–10.16)

## 2020-12-15 PROCEDURE — 0241U HB NFCT DS VIR RESP RNA 4 TRGT: CPT | Performed by: EMERGENCY MEDICINE

## 2020-12-15 PROCEDURE — 71046 X-RAY EXAM CHEST 2 VIEWS: CPT

## 2020-12-15 PROCEDURE — U0003 INFECTIOUS AGENT DETECTION BY NUCLEIC ACID (DNA OR RNA); SEVERE ACUTE RESPIRATORY SYNDROME CORONAVIRUS 2 (SARS-COV-2) (CORONAVIRUS DISEASE [COVID-19]), AMPLIFIED PROBE TECHNIQUE, MAKING USE OF HIGH THROUGHPUT TECHNOLOGIES AS DESCRIBED BY CMS-2020-01-R: HCPCS | Performed by: NURSE PRACTITIONER

## 2020-12-15 PROCEDURE — 85730 THROMBOPLASTIN TIME PARTIAL: CPT | Performed by: EMERGENCY MEDICINE

## 2020-12-15 PROCEDURE — 85379 FIBRIN DEGRADATION QUANT: CPT | Performed by: EMERGENCY MEDICINE

## 2020-12-15 PROCEDURE — 80076 HEPATIC FUNCTION PANEL: CPT | Performed by: EMERGENCY MEDICINE

## 2020-12-15 PROCEDURE — 99285 EMERGENCY DEPT VISIT HI MDM: CPT

## 2020-12-15 PROCEDURE — 71275 CT ANGIOGRAPHY CHEST: CPT

## 2020-12-15 PROCEDURE — 81003 URINALYSIS AUTO W/O SCOPE: CPT | Performed by: EMERGENCY MEDICINE

## 2020-12-15 PROCEDURE — 80048 BASIC METABOLIC PNL TOTAL CA: CPT | Performed by: EMERGENCY MEDICINE

## 2020-12-15 PROCEDURE — 99442 PR PHYS/QHP TELEPHONE EVALUATION 11-20 MIN: CPT | Performed by: NURSE PRACTITIONER

## 2020-12-15 PROCEDURE — 85610 PROTHROMBIN TIME: CPT | Performed by: EMERGENCY MEDICINE

## 2020-12-15 PROCEDURE — 84484 ASSAY OF TROPONIN QUANT: CPT | Performed by: EMERGENCY MEDICINE

## 2020-12-15 PROCEDURE — 93005 ELECTROCARDIOGRAM TRACING: CPT

## 2020-12-15 PROCEDURE — 85025 COMPLETE CBC W/AUTO DIFF WBC: CPT | Performed by: EMERGENCY MEDICINE

## 2020-12-15 PROCEDURE — 99284 EMERGENCY DEPT VISIT MOD MDM: CPT | Performed by: EMERGENCY MEDICINE

## 2020-12-15 PROCEDURE — G1004 CDSM NDSC: HCPCS

## 2020-12-15 RX ORDER — MULTIVIT WITH MINERALS/LUTEIN
1000 TABLET ORAL 2 TIMES DAILY
Qty: 30 TABLET | Refills: 0 | Status: SHIPPED | OUTPATIENT
Start: 2020-12-15 | End: 2021-09-20 | Stop reason: ALTCHOICE

## 2020-12-15 RX ORDER — MELATONIN
2000 DAILY
Qty: 30 TABLET | Refills: 0 | Status: SHIPPED | OUTPATIENT
Start: 2020-12-15 | End: 2022-02-02

## 2020-12-15 RX ADMIN — IOHEXOL 85 ML: 350 INJECTION, SOLUTION INTRAVENOUS at 20:52

## 2020-12-16 ENCOUNTER — TELEMEDICINE (OUTPATIENT)
Dept: INTERNAL MEDICINE CLINIC | Facility: CLINIC | Age: 65
End: 2020-12-16
Payer: MEDICARE

## 2020-12-16 ENCOUNTER — HOSPITAL ENCOUNTER (OUTPATIENT)
Dept: INFUSION CENTER | Facility: HOSPITAL | Age: 65
Discharge: HOME | End: 2020-12-16
Payer: MEDICARE

## 2020-12-16 ENCOUNTER — APPOINTMENT (OUTPATIENT)
Dept: PHYSICAL THERAPY | Facility: CLINIC | Age: 65
End: 2020-12-16
Payer: MEDICARE

## 2020-12-16 VITALS
DIASTOLIC BLOOD PRESSURE: 82 MMHG | TEMPERATURE: 99.4 F | OXYGEN SATURATION: 92 % | SYSTOLIC BLOOD PRESSURE: 126 MMHG | RESPIRATION RATE: 20 BRPM | HEART RATE: 81 BPM

## 2020-12-16 DIAGNOSIS — U07.1 COVID-19: Primary | ICD-10-CM

## 2020-12-16 PROCEDURE — 99442 PR PHYS/QHP TELEPHONE EVALUATION 11-20 MIN: CPT | Performed by: NURSE PRACTITIONER

## 2020-12-16 PROCEDURE — M0239 BAMLANIVIMAB-XXXX INFUSION: HCPCS | Performed by: FAMILY MEDICINE

## 2020-12-16 RX ORDER — SODIUM CHLORIDE 9 MG/ML
20 INJECTION, SOLUTION INTRAVENOUS ONCE
Status: COMPLETED | OUTPATIENT
Start: 2020-12-16 | End: 2020-12-16

## 2020-12-16 RX ORDER — ACETAMINOPHEN 325 MG/1
650 TABLET ORAL ONCE AS NEEDED
Status: CANCELLED | OUTPATIENT
Start: 2020-12-16

## 2020-12-16 RX ORDER — SODIUM CHLORIDE 9 MG/ML
20 INJECTION, SOLUTION INTRAVENOUS ONCE
Status: CANCELLED | OUTPATIENT
Start: 2020-12-16

## 2020-12-16 RX ORDER — ALBUTEROL SULFATE 90 UG/1
3 AEROSOL, METERED RESPIRATORY (INHALATION) ONCE AS NEEDED
Status: CANCELLED | OUTPATIENT
Start: 2020-12-16

## 2020-12-16 RX ORDER — ACETAMINOPHEN 325 MG/1
650 TABLET ORAL ONCE AS NEEDED
Status: DISCONTINUED | OUTPATIENT
Start: 2020-12-16 | End: 2020-12-19 | Stop reason: HOSPADM

## 2020-12-16 RX ORDER — ALBUTEROL SULFATE 90 UG/1
3 AEROSOL, METERED RESPIRATORY (INHALATION) ONCE AS NEEDED
Status: DISCONTINUED | OUTPATIENT
Start: 2020-12-16 | End: 2020-12-19 | Stop reason: HOSPADM

## 2020-12-16 RX ADMIN — SODIUM CHLORIDE 700 MG: 9 INJECTION, SOLUTION INTRAVENOUS at 12:31

## 2020-12-16 RX ADMIN — SODIUM CHLORIDE 20 ML/HR: 0.9 INJECTION, SOLUTION INTRAVENOUS at 12:30

## 2020-12-16 RX ADMIN — ACETAMINOPHEN 650 MG: 325 TABLET, FILM COATED ORAL at 13:40

## 2020-12-17 ENCOUNTER — TELEMEDICINE (OUTPATIENT)
Dept: INTERNAL MEDICINE CLINIC | Facility: CLINIC | Age: 65
End: 2020-12-17
Payer: MEDICARE

## 2020-12-17 VITALS — OXYGEN SATURATION: 98 %

## 2020-12-17 DIAGNOSIS — U07.1 COVID-19: Primary | ICD-10-CM

## 2020-12-17 LAB
ATRIAL RATE: 98 BPM
P AXIS: 0 DEGREES
PR INTERVAL: 160 MS
QRS AXIS: -1 DEGREES
QRSD INTERVAL: 66 MS
QT INTERVAL: 332 MS
QTC INTERVAL: 423 MS
SARS-COV-2 RNA SPEC QL NAA+PROBE: DETECTED
T WAVE AXIS: 35 DEGREES
VENTRICULAR RATE: 98 BPM

## 2020-12-17 PROCEDURE — 99442 PR PHYS/QHP TELEPHONE EVALUATION 11-20 MIN: CPT | Performed by: NURSE PRACTITIONER

## 2020-12-17 PROCEDURE — 93010 ELECTROCARDIOGRAM REPORT: CPT | Performed by: INTERNAL MEDICINE

## 2020-12-18 ENCOUNTER — APPOINTMENT (OUTPATIENT)
Dept: PHYSICAL THERAPY | Facility: CLINIC | Age: 65
End: 2020-12-18
Payer: MEDICARE

## 2020-12-18 DIAGNOSIS — U07.1 COVID-19: Primary | ICD-10-CM

## 2020-12-18 RX ORDER — AZITHROMYCIN 250 MG/1
TABLET, FILM COATED ORAL
Qty: 6 TABLET | Refills: 0 | Status: SHIPPED | OUTPATIENT
Start: 2020-12-18 | End: 2020-12-22

## 2020-12-21 ENCOUNTER — APPOINTMENT (OUTPATIENT)
Dept: PHYSICAL THERAPY | Facility: CLINIC | Age: 65
End: 2020-12-21
Payer: MEDICARE

## 2020-12-22 DIAGNOSIS — R05.9 COUGH: Primary | ICD-10-CM

## 2020-12-22 RX ORDER — METHYLPREDNISOLONE 4 MG/1
TABLET ORAL
Qty: 21 EACH | Refills: 0 | Status: SHIPPED | OUTPATIENT
Start: 2020-12-22 | End: 2022-02-02

## 2020-12-23 ENCOUNTER — TELEMEDICINE (OUTPATIENT)
Dept: INTERNAL MEDICINE CLINIC | Facility: CLINIC | Age: 65
End: 2020-12-23
Payer: MEDICARE

## 2020-12-23 ENCOUNTER — APPOINTMENT (OUTPATIENT)
Dept: PHYSICAL THERAPY | Facility: CLINIC | Age: 65
End: 2020-12-23
Payer: MEDICARE

## 2020-12-23 VITALS — OXYGEN SATURATION: 98 %

## 2020-12-23 DIAGNOSIS — U07.1 COVID-19: Primary | ICD-10-CM

## 2020-12-23 PROBLEM — Z01.818 PREOPERATIVE CLEARANCE: Status: RESOLVED | Noted: 2020-11-06 | Resolved: 2020-12-23

## 2020-12-23 PROCEDURE — 99213 OFFICE O/P EST LOW 20 MIN: CPT | Performed by: NURSE PRACTITIONER

## 2020-12-28 ENCOUNTER — APPOINTMENT (OUTPATIENT)
Dept: PHYSICAL THERAPY | Facility: CLINIC | Age: 65
End: 2020-12-28
Payer: MEDICARE

## 2020-12-28 DIAGNOSIS — K21.9 GASTROESOPHAGEAL REFLUX DISEASE WITHOUT ESOPHAGITIS: Primary | ICD-10-CM

## 2020-12-28 RX ORDER — OMEPRAZOLE 20 MG/1
20 CAPSULE, DELAYED RELEASE ORAL
Qty: 90 CAPSULE | Refills: 3 | Status: SHIPPED | OUTPATIENT
Start: 2020-12-28 | End: 2022-02-02

## 2020-12-29 ENCOUNTER — TELEMEDICINE (OUTPATIENT)
Dept: INTERNAL MEDICINE CLINIC | Facility: CLINIC | Age: 65
End: 2020-12-29
Payer: MEDICARE

## 2020-12-29 DIAGNOSIS — U07.1 COVID-19: Primary | ICD-10-CM

## 2020-12-29 PROCEDURE — 99213 OFFICE O/P EST LOW 20 MIN: CPT | Performed by: NURSE PRACTITIONER

## 2020-12-30 ENCOUNTER — APPOINTMENT (OUTPATIENT)
Dept: PHYSICAL THERAPY | Facility: CLINIC | Age: 65
End: 2020-12-30
Payer: MEDICARE

## 2021-01-04 ENCOUNTER — APPOINTMENT (OUTPATIENT)
Dept: PHYSICAL THERAPY | Facility: CLINIC | Age: 66
End: 2021-01-04
Payer: MEDICARE

## 2021-01-04 ENCOUNTER — OFFICE VISIT (OUTPATIENT)
Dept: PHYSICAL THERAPY | Facility: CLINIC | Age: 66
End: 2021-01-04
Payer: MEDICARE

## 2021-01-04 DIAGNOSIS — M75.41 IMPINGEMENT SYNDROME OF RIGHT SHOULDER: Primary | ICD-10-CM

## 2021-01-04 PROCEDURE — 97110 THERAPEUTIC EXERCISES: CPT

## 2021-01-04 PROCEDURE — 97140 MANUAL THERAPY 1/> REGIONS: CPT

## 2021-01-04 NOTE — PROGRESS NOTES
Daily Note     Today's date: 2021  Patient name: [facility]  : 1955  MRN: [id number]  Referring provider: Clary Hughes*  Dx:   Encounter Diagnosis     ICD-10-CM    1  Impingement syndrome of right shoulder  M75 41                   Subjective: Patient reports he has been trying to keep the shoulder moving while his PT was on HOLD for medical reasons  He states he has the most discomfor in the bicep area  Objective: See treatment diary below  The exercise program was progressed per protocol (5wks)      Assessment: Tolerated treatment well  Patient exhibited good technique with therapeutic exercises  Patient noted soreness post exercise and PROM      Plan: Continue per plan of care           Precautions: Per protocol      Manuals      PROM R' S' and E' RK RK LF LF     STM R' biceps  RK                Neuro Re-Ed         3 Position Prone Scapular Retraction          Ball Stabilization         Bodyblade         PNF                  Ther Ex         Passive Pendulums 10-20x QID HEP 20x ea 20X 20X     Pulleys     NV    Wand: Passive ER 10-20x QID HEP 20x ea 20X 20X     Scapular Retractions 10-20x QID HEP 20x ea 20X 20X iso @ wall     Scap Dep Iso @ table    20X     Elbow AROM  20x 20X HEP     Wall Slides         Shoulder Isometrics         TB High Row         TB Mid Row         TB Low Row         TB Biceps    L1 2/10     TB Triceps    L1 2/10     TB Upright Row         TB Flexion         TB Abduction         TB SA Pulldown         TB IR         TB ER         TB Clock         SL ER         SL ABD         SL Flexion         SL Horiz ABD         FWD/LAT Raises         DB OH Press         Ceiling Punches         Hoist Row: S         LAT Pulldown         XO Biceps         XO Triceps         XO Upright Row         XO IR         XO ER         XO SA Pulldowns         Blackburns                  Ther Activity         Reaching         Carrying         Lifting         Catching Throwing                  Modalities         CP  Def by patient 13'

## 2021-01-06 ENCOUNTER — APPOINTMENT (OUTPATIENT)
Dept: PHYSICAL THERAPY | Facility: CLINIC | Age: 66
End: 2021-01-06
Payer: MEDICARE

## 2021-01-08 ENCOUNTER — OFFICE VISIT (OUTPATIENT)
Dept: PHYSICAL THERAPY | Facility: CLINIC | Age: 66
End: 2021-01-08
Payer: MEDICARE

## 2021-01-08 DIAGNOSIS — M75.41 IMPINGEMENT SYNDROME OF RIGHT SHOULDER: Primary | ICD-10-CM

## 2021-01-08 PROCEDURE — 97110 THERAPEUTIC EXERCISES: CPT

## 2021-01-08 PROCEDURE — 97140 MANUAL THERAPY 1/> REGIONS: CPT

## 2021-01-08 NOTE — PROGRESS NOTES
Daily Note     Today's date: 2021  Patient name: Vielka Ramos  : 1955  MRN: [id number]  Referring provider: Jeet Sosa*  Dx:   Encounter Diagnosis     ICD-10-CM    1  Impingement syndrome of right shoulder  M75 41                   Subjective: Patient reports he has little actual shoulder pain  He describes it as annoying soreness, especially during the night  Objective: See treatment diary below      Assessment: Tolerated treatment well  Patient would benefit from continued PT      Plan: Continue per plan of care           Precautions: Per protocol      Manuals     PROM R' S' and E' RK RK LF LF LF    STM R' biceps  RK                Neuro Re-Ed         3 Position Prone Scapular Retraction          Ball Stabilization         Bodyblade         PNF                  Ther Ex         Passive Pendulums 10-20x QID HEP 20x ea 20X 20X D/C    Pulleys         Wand: Passive ER 10-20x QID HEP 20x ea 20X 20X 20X    Scapular Retractions 10-20x QID HEP 20x ea 20X 20X iso @ wall 20X@ wall    Scap Dep Iso @ table    20X 20X    Elbow AROM  20x 20X HEP     Table slides     20X    Wall Slides         Shoulder Isometrics         TB High Row         TB Mid Row         TB Low Row         TB Biceps    L1 2/10 L1 2/10    TB Triceps    L1 2/10 L1 2/10    TB Upright Row         TB Flexion         TB Abduction         TB SA Pulldown         TB IR         TB ER         TB Clock         SL ER         SL ABD         SL Flexion         SL Horiz ABD         FWD/LAT Raises         DB OH Press         Ceiling Punches         Hoist Row: S         LAT Pulldown         XO Biceps         XO Triceps         XO Upright Row         XO IR         XO ER         XO SA Pulldowns         Blackburns                  Ther Activity         Reaching         Carrying         Lifting         Catching         Throwing                  Modalities         CP  Def by patient 13'  15'

## 2021-01-11 ENCOUNTER — TRANSCRIBE ORDERS (OUTPATIENT)
Dept: PHYSICAL THERAPY | Facility: CLINIC | Age: 66
End: 2021-01-11

## 2021-01-11 ENCOUNTER — OFFICE VISIT (OUTPATIENT)
Dept: PHYSICAL THERAPY | Facility: CLINIC | Age: 66
End: 2021-01-11
Payer: MEDICARE

## 2021-01-11 DIAGNOSIS — M75.41 IMPINGEMENT SYNDROME OF RIGHT SHOULDER: Primary | ICD-10-CM

## 2021-01-11 PROCEDURE — 97110 THERAPEUTIC EXERCISES: CPT

## 2021-01-11 PROCEDURE — 97140 MANUAL THERAPY 1/> REGIONS: CPT

## 2021-01-11 NOTE — LETTER
2021    Tomasz Amezquita MD  [address] [facility] [address]    Patient: Noemi Larson   YOB: 1955   Date of Visit: 2021     Encounter Diagnosis     ICD-10-CM    1  Impingement syndrome of right shoulder  M75 41        Dear Dr Ray Fuel:    Thank you for your recent referral of Noemi Larson  Please review the attached evaluation summary from Javy's recent visit  Please verify that you agree with the plan of care by signing the attached order  If you have any questions or concerns, please do not hesitate to call  I sincerely appreciate the opportunity to share in the care of one of your patients and hope to have another opportunity to work with you in the near future  Sincerely,    Priyanka Warren PT      Referring Provider:      I certify that I have read the below Plan of Care and certify the need for these services furnished under this plan of treatment while under my care  Tomasz Amezquita MD  [address] [facility]  Via Fax: [phone]          Daily Note     Today's date: 2021  Patient name: Noemi Larson  : 1955  MRN: [id number]  Referring provider: Pearlie Peabody*  Dx:   Encounter Diagnosis     ICD-10-CM    1  Impingement syndrome of right shoulder  M75 41                   Subjective: Pt  to see Dr Dyan Hernandez  Pt  notes pain in his bicep today  Objective: See treatment diary below      Assessment: Tolerated treatment well  Patient demonstrated fatigue post treatment, exhibited good technique with therapeutic exercises and would benefit from continued PT  Added prone row/ext as well as Mid/low Row without adverse effect  Plan: Progress as/protocol  See RE        Precautions: Per protocol      Manuals    PROM R' S' and E' RK RK LF LF LF KR   STM R' biceps  RK                Neuro Re-Ed         3 Position Prone Scapular Retraction  Add prone "horizontal Abd palm down     Row  Ext  2/10   Ball Stabilization         Bodyblade         PNF                  Ther Ex         Passive Pendulums 10-20x QID HEP 20x ea 20X 20X D/C    Pulleys      2/10   Wand: Passive ER 10-20x QID HEP 20x ea 20X 20X 20X 10"x10   Scapular Retractions 10-20x QID HEP 20x ea 20X 20X iso @ wall 20X@ wall    Scap Dep Iso @ table    20X 20X NV   Elbow AROM  20x 20X HEP     Table slides     20X    Wall Slides         Shoulder Isometrics         TB High Row         TB Mid Row      L1 2/10   TB Low Row      L1  2/10   TB Biceps    L1 2/10 L1 2/10  L1  2/10   TB Triceps    L1 2/10 L1 2/10 L1  2/10   TB Upright Row         TB Flexion         TB Abduction         TB SA Pulldown         TB IR         TB ER         TB Clock         SL ER      2/10   SL ABD         SL Flexion         SL Horiz ABD         FWD/LAT Raises         DB OH Press         Ceiling Punches         Hoist Row: S         LAT Pulldown         XO Biceps         XO Triceps         XO Upright Row         XO IR         XO ER         XO SA Pulldowns         Blackburns                  Ther Activity         Reaching         Carrying         Lifting         Catching         Throwing                  Modalities         CP  Def by patient 13'  15' 10'                   PT Re-Evaluation     Today's date: 2020  Patient name: Karlos Hutton  : 1955  MRN: [id number]  Referring provider: Moe Luna*  Dx:   Encounter Diagnosis     ICD-10-CM    1  Impingement syndrome of right shoulder  M75 41     t     Start Time: 1000  Stop Time: 1110  Total time in clinic (min): 70 minutes    Assessment  Assessment details:   CURRENT FUNCTIONAL STATUS    Dressing tolerance: Assist of one  Bathing/washing hair tolerance: Unable with right arm  Unable to reach with right arm  Unable to lift with right arm      Work Status: Off    AROM  Flexion:  156   Abduction 150  External rotation:  50     Strength   Flexion:  8#  " Abduction:  3#    External rotation:  7#  SHORT TERM GOALS (2 WEEKS)    Increase shoulder PROM by 30-40 degrees  Decrease pain to 0-4/10  Dressing tolerance: Independent  Bathing/washing hair tolerance: Refrain from using right arm  Refrain from reaching with the right arm  Refrain from lifting with the right arm  Work Status: Remain off    LONG TERM GOALS (DISCHARGE)    Shoulder AROM: F=170 deg, ABD=180 deg, ER=45 deg, IR BTB=T-L JCT  - proressing  Shoulder Strength: F=57 lbs, ABD=53 lbs without pain, ER=25 lbs without pain, IR=43 lbs  - progressing   Decrease pain to 0-2/10  - progressing   Bathing/washing hair tolerance: Good with right arm  - progressing    Able to reach overhead with the right arm  - progressing   Able to lift 10 lbs above shoulder level with the right arm  - not met   Work Status: Full Duty - not met  Understanding of Dx/Px/POC: good   Prognosis: good    Goals  See assessment details above  Plan  Plan details: Karlos Hutton is a 76 y o  male presenting to PT with pain, decreased range of motion, decreased strength, and decreased tolerance to activity  This patient would benefit from skilled PT services to address these issues and to maximize function  He is now six week post op and progressing well towards all goals  He will continue to benefit from skilled PT to address remaining deficits  Patient would benefit from: skilled physical therapy  Planned modality interventions: unattended electrical stimulation, thermotherapy: hydrocollator packs and cryotherapy  Planned therapy interventions: manual therapy, neuromuscular re-education, therapeutic activities and therapeutic exercise  Frequency: 2x week  Duration in weeks: 4        Subjective Evaluation    History of Present Illness  Mechanism of injury: CC: Right shoulder pain, stiffness, and weakness  HPI: The patient's right shoulder problem began in 2015 after a fall  He had a RTC repair performed on 11/30/2020   He is wearing a sling and takes medication for relief  He is employed as a farmer  Pain  Current pain rating: 3  At best pain ratin  At worst pain ratin    Hand dominance: right    Patient Goals  Patient goals for therapy: decreased pain, increased motion, increased strength and return to work          Objective     General Comments:      Shoulder Comments   CURRENT OBJECTIVE MEASUREMENTS    Shoulder AROM: Deferred  Shoulder PROM: F=45 deg, ABD=45 deg, ER=5 deg in neutral, IR=Deferred     Shoulder Strength: Deferred                  Precautions: Per protocol      Manuals         PROM RK        STM                  Neuro Re-Ed         3 Position Prone Scapular Retraction          Ball Stabilization         Bodyblade         PNF                  Ther Ex         Passive Pendulums 10-20x QID HEP        Pulleys         Wand: Passive ER 10-20x QID HEP        Scapular Retractions 10-20x QID HEP        Table Slides         Wall Slides         Shoulder Isometrics         TB High Row         TB Mid Row         TB Low Row         TB Biceps         TB Triceps         TB Upright Row         TB Flexion         TB Abduction         TB SA Pulldown         TB IR         TB ER         TB Clock         SL ER         SL ABD         SL Flexion         SL Horiz ABD         FWD/LAT Raises         DB OH Press         Ceiling Punches         Hoist Row: S         LAT Pulldown         XO Biceps         XO Triceps         XO Upright Row         XO IR         XO ER         XO SA Pulldowns         Blackburns                  Ther Activity         Reaching         Carrying         Lifting         Catching         Throwing                  Modalities         CP/IFC

## 2021-01-11 NOTE — PROGRESS NOTES
"PT Re-Evaluation     Today's date: 2020  Patient name: Pilo Lemus  : 1955  MRN: [id number]  Referring provider: Geovani Melissa*  Dx:   Encounter Diagnosis     ICD-10-CM    1  Impingement syndrome of right shoulder  M75 41     t     Start Time: 1000  Stop Time: 1110  Total time in clinic (min): 70 minutes    Assessment  Assessment details:   CURRENT FUNCTIONAL STATUS    Dressing tolerance: Assist of one  Bathing/washing hair tolerance: Unable with right arm  Unable to reach with right arm  Unable to lift with right arm  Work Status: Off    AROM  Flexion:  156   Abduction 150  External rotation:  50     Strength   Flexion:  8#  Abduction:  3#    External rotation:  7#  SHORT TERM GOALS (2 WEEKS)    Increase shoulder PROM by 30-40 degrees  Decrease pain to 0-4/10  Dressing tolerance: Independent  Bathing/washing hair tolerance: Refrain from using right arm  Refrain from reaching with the right arm  Refrain from lifting with the right arm  Work Status: Remain off    LONG TERM GOALS (DISCHARGE)    Shoulder AROM: F=170 deg, ABD=180 deg, ER=45 deg, IR BTB=T-L JCT  - proressing  Shoulder Strength: F=57 lbs, ABD=53 lbs without pain, ER=25 lbs without pain, IR=43 lbs  - progressing   Decrease pain to 0-2/10  - progressing   Bathing/washing hair tolerance: Good with right arm  - progressing    Able to reach overhead with the right arm  - progressing   Able to lift 10 lbs above shoulder level with the right arm  - not met   Work Status: Full Duty - not met  Understanding of Dx/Px/POC: good   Prognosis: good    Goals  See assessment details above  Plan  Plan details: Pilo Lemus is a 76 y o  male presenting to PT with pain, decreased range of motion, decreased strength, and decreased tolerance to activity  This patient would benefit from skilled PT services to address these issues and to maximize function   He is now six week post op and progressing well towards all " goals  He will continue to benefit from skilled PT to address remaining deficits  Patient would benefit from: skilled physical therapy  Planned modality interventions: unattended electrical stimulation, thermotherapy: hydrocollator packs and cryotherapy  Planned therapy interventions: manual therapy, neuromuscular re-education, therapeutic activities and therapeutic exercise  Frequency: 2x week  Duration in weeks: 4        Subjective Evaluation    History of Present Illness  Mechanism of injury: CC: Right shoulder pain, stiffness, and weakness  HPI: The patient's right shoulder problem began in  after a fall  He had a RTC repair performed on 2020  He is wearing a sling and takes medication for relief  He is employed as a farmer  Pain  Current pain rating: 3  At best pain ratin  At worst pain ratin    Hand dominance: right    Patient Goals  Patient goals for therapy: decreased pain, increased motion, increased strength and return to work          Objective     General Comments:      Shoulder Comments   CURRENT OBJECTIVE MEASUREMENTS    Shoulder AROM: Deferred  Shoulder PROM: F=45 deg, ABD=45 deg, ER=5 deg in neutral, IR=Deferred     Shoulder Strength: Deferred                  Precautions: Per protocol      Manuals         PROM RK        STM                  Neuro Re-Ed         3 Position Prone Scapular Retraction          Ball Stabilization         Bodyblade         PNF                  Ther Ex         Passive Pendulums 10-20x QID HEP        Pulleys         Wand: Passive ER 10-20x QID HEP        Scapular Retractions 10-20x QID HEP        Table Slides         Wall Slides         Shoulder Isometrics         TB High Row         TB Mid Row         TB Low Row         TB Biceps         TB Triceps         TB Upright Row         TB Flexion         TB Abduction         TB SA Pulldown         TB IR         TB ER         TB Clock         SL ER         SL ABD         SL Flexion         SL Horiz ABD         FWD/LAT Raises         DB OH Press         Ceiling Punches         Hoist Row: S         LAT Pulldown         XO Biceps         XO Triceps         XO Upright Row         XO IR         XO ER         XO SA Pulldowns         Blackburns                  Ther Activity         Reaching         Carrying         Lifting         Catching         Throwing                  Modalities         CP/IFC

## 2021-01-11 NOTE — PROGRESS NOTES
Daily Note     Today's date: 2021  Patient name: Igor Jefferson  : 1955  MRN: [id number]  Referring provider: Ashley Valentin*  Dx:   Encounter Diagnosis     ICD-10-CM    1  Impingement syndrome of right shoulder  M75 41                   Subjective: Pt  to see Dr Mia Luke  Pt  notes pain in his bicep today  Objective: See treatment diary below      Assessment: Tolerated treatment well  Patient demonstrated fatigue post treatment, exhibited good technique with therapeutic exercises and would benefit from continued PT  Added prone row/ext as well as Mid/low Row without adverse effect  Plan: Progress as/protocol  See RE        Precautions: Per protocol      Manuals    PROM R' S' and E' RK RK LF LF LF KR   STM R' biceps  RK                Neuro Re-Ed         3 Position Prone Scapular Retraction  Add prone horizontal Abd palm down     Row  Ext  2/10   Ball Stabilization         Bodyblade         PNF                  Ther Ex         Passive Pendulums 10-20x QID HEP 20x ea 20X 20X D/C    Pulleys      2/10   Wand: Passive ER 10-20x QID HEP 20x ea 20X 20X 20X 10"x10   Scapular Retractions 10-20x QID HEP 20x ea 20X 20X iso @ wall 20X@ wall    Scap Dep Iso @ table    20X 20X NV   Elbow AROM  20x 20X HEP     Table slides     20X    Wall Slides         Shoulder Isometrics         TB High Row         TB Mid Row      L1 2/10   TB Low Row      L1  2/10   TB Biceps    L1 2/10 L1 2/10  L1  2/10   TB Triceps    L1 2/10 L1 2/10 L1  2/10   TB Upright Row         TB Flexion         TB Abduction         TB SA Pulldown         TB IR         TB ER         TB Clock         SL ER      2/10   SL ABD         SL Flexion         SL Horiz ABD         FWD/LAT Raises         DB OH Press         Ceiling Punches         Hoist Row: S         LAT Pulldown         XO Biceps         XO Triceps         XO Upright Row         XO IR         XO ER         XO SA Pulldowns         Carrie Ther Activity         Reaching         Carrying         Lifting         Catching         Throwing                  Modalities         CP  Def by patient 13'  15' 10'

## 2021-01-14 ENCOUNTER — OFFICE VISIT (OUTPATIENT)
Dept: PHYSICAL THERAPY | Facility: CLINIC | Age: 66
End: 2021-01-14
Payer: MEDICARE

## 2021-01-14 DIAGNOSIS — M75.41 IMPINGEMENT SYNDROME OF RIGHT SHOULDER: Primary | ICD-10-CM

## 2021-01-14 PROCEDURE — 97140 MANUAL THERAPY 1/> REGIONS: CPT

## 2021-01-14 PROCEDURE — 97110 THERAPEUTIC EXERCISES: CPT

## 2021-01-14 NOTE — PROGRESS NOTES
Daily Note     Today's date: 2021  Patient name: Amarilys Allison  : 1955  MRN: [id number]  Referring provider: Amie John*  Dx:   Encounter Diagnosis     ICD-10-CM    1  Impingement syndrome of right shoulder  M75 41                   Subjective: Patient reports his arm feels " a little tight"   He has to put the sling back on at times to relieve pain  Objective: See treatment diary below      Assessment: Tolerated treatment well  Patient exhibited good technique with therapeutic exercises      Plan: Continue per plan of care              Precautions: Per protocol        Manuals    PROM R' S' and E' LF RK LF LF LF KR   STM R' biceps   RK                           Neuro Re-Ed               3 Position Prone Scapular Retraction  Add prone horizontal Abd palm down  ++       Row  Ext  2/10   Ball Stabilization               Bodyblade               PNF                               Ther Ex                        Pulleys  20X         2/10   Wand: Passive ER 20x  20x ea 20X 20X 20X 10"x10   Scapular Retractions Iso @ wall 20X 20x ea 20X 20X iso @ wall 20X@ wall     Scap Dep     20X     20X 20X NV                              Shoulder Isometrics Abd @ wall 20X             TB High Row               TB Mid Row  L2 2/10         L1 2/10   TB Low Row  L1 2/10         L1  2/10   TB Biceps  L1 2/10     L1 2/10 L1 2/10  L1  2/10   TB Triceps  L2 2/10     L1 2/10 L1 2/10 L1  2/10   TB Upright Row               TB Flexion               TB Abduction               TB SA Pulldown               TB IR               TB ER               TB Clock               SL ER  2/10         2/10   SL ABD               SL Flexion               SL Horiz ABD               FWD/LAT Raises               DB OH Press               Ceiling Punches               Hoist Row: S               LAT Pulldown               XO Biceps               XO Triceps               XO Upright Row               XO IR               XO ER               XO SA Pulldowns               Blackburns                               Ther Activity               Reaching               Carrying               Lifting               Catching               Throwing                               Modalities               CP  10' Def by patient 13'   13' 10'

## 2021-01-18 ENCOUNTER — OFFICE VISIT (OUTPATIENT)
Dept: PHYSICAL THERAPY | Facility: CLINIC | Age: 66
End: 2021-01-18
Payer: MEDICARE

## 2021-01-18 DIAGNOSIS — M75.41 IMPINGEMENT SYNDROME OF RIGHT SHOULDER: Primary | ICD-10-CM

## 2021-01-18 PROCEDURE — 97140 MANUAL THERAPY 1/> REGIONS: CPT

## 2021-01-18 PROCEDURE — 97110 THERAPEUTIC EXERCISES: CPT

## 2021-01-18 NOTE — PROGRESS NOTES
Daily Note     Today's date: 2021  Patient name: Jelani Brooks  : 1955  MRN: [id number]  Referring provider: Willie Adams*  Dx:   Encounter Diagnosis     ICD-10-CM    1  Impingement syndrome of right shoulder  M75 41                   Subjective: Patient reports he had increased, temporary soreness post last session  He applied CP       Objective: See treatment diary below      Assessment: Tolerated treatment well   Patient exhibited good technique with therapeutic exercises      Plan: Continue per plan of care              Manuals    PROM R' S' and E' LF LF LF LF LF KR   STM R' biceps                              Neuro Re-Ed               3 Position Prone Scapular Retraction  Add prone horizontal Abd palm down  10X       Row  Ext  2/10   Ball Stabilization               Bodyblade               PNF                               Ther Ex                               Pulleys  20X  20X       2/10   Wand: Passive ER 20x  20x ea 20X 20X 20X 10"x10   Supine wand flexion  10X       Scapular Retractions Iso @ wall 20X 20x ea 20X 20X iso @ wall 20X@ wall     Scap Dep     20X  20X   20X 20X NV                                                   Shoulder Isometrics Abd @ wall 20X  20X           TB High Row               TB Mid Row  L2 2/10  L2 2/10       L1 2/10   TB Low Row  L1 2/10  L1 2/10       L1  2/10   TB Biceps  L1 2/10  L1 2/10   L1 2/10 L1 2/10  L1  2/10   TB Triceps  L2 2/10  L2 2/10   L1 2/10 L1 2/10 L1  2/10   TB Upright Row               TB Flexion               TB Abduction               TB SA Pulldown               TB IR               TB ER               TB Clock               SL ER  2/10  2/10       2/10   SL ABD               SL Flexion               SL Horiz ABD               FWD/LAT Raises               DB OH Press               Ceiling Punches               Hoist Row: S               LAT Pulldown               XO Biceps               XO Triceps             XO Upright Row               XO IR               XO ER               XO SA Pulldowns               Blackburns                               Ther Activity               Reaching               Carrying               Lifting               Catching               Throwing                               Modalities               CP  10' 10' 15'   15' 10'

## 2021-01-20 ENCOUNTER — OFFICE VISIT (OUTPATIENT)
Dept: PHYSICAL THERAPY | Facility: CLINIC | Age: 66
End: 2021-01-20
Payer: MEDICARE

## 2021-01-20 DIAGNOSIS — M75.41 IMPINGEMENT SYNDROME OF RIGHT SHOULDER: Primary | ICD-10-CM

## 2021-01-20 PROCEDURE — 97110 THERAPEUTIC EXERCISES: CPT

## 2021-01-20 PROCEDURE — 97140 MANUAL THERAPY 1/> REGIONS: CPT

## 2021-01-20 NOTE — PROGRESS NOTES
Daily Note     Today's date: 2021  Patient name: Lyaa Garcia  : 1955  MRN: [id number]  Referring provider: Merritt Covarrubias*  Dx:   Encounter Diagnosis     ICD-10-CM    1  Impingement syndrome of right shoulder  M75 41                   Subjective: Patient reports he has some degree of constant soreness in the shoulder,arm  He c/o stiffness  Objective: See treatment diary below      Assessment: Tolerated treatment well  Patient would benefit from continued PT  Prone flexion in scapular plane added today per protocol      Plan: Continue per plan of care        {         Manuals    PROM R' S' and E' LF LF LF LF LF KR   STM R' biceps                               Neuro Re-Ed               3 Position Prone Scapular Retraction  Add prone horizontal Abd palm down  10X  10X     Row  Ext  2/10   Ball Stabilization               Bodyblade               PNF                               Ther Ex                               Pulleys  20X  20X  20X     2/10   Wand: Passive ER 20x  20x ea 10X 20X 20X 10"x10   Supine wand flexion   10X  10X         Scapular Retractions Iso @ wall 20X 20x ea 20X 20X iso @ wall 20X@ wall     Scap Dep  Iso table  20X  20X 20X  20X 20X NV    Prone horiz abd palm down      10X          Prone scaption      10X                         Shoulder Isometrics Abd @ wall 20X  20X  20X         TB High Row               TB Mid Row  L2 2/10  L2 2/10  L2 2/10     L1 2/10   TB Low Row  L1 2/10  L1 2/10  L1 2/10     L1  2/10   TB Biceps  L1 2/10  L1 2/10  L1 2/10 L1 2/10 L1 2/10  L1  2/10   TB Triceps  L2 2/10  L2 2/10  L2 2/10 L1 2/10 L1 2/10 L1  2/10   TB Upright Row               TB Flexion               TB Abduction               TB SA Pulldown               TB IR               TB ER               TB Clock               SL ER  2/10  2/10  10X     2/10   SL ABD               SL Flexion               SL Horiz ABD               FWD/LAT Raises               DB OH Press               Ceiling Punches               Hoist Row: S               LAT Pulldown               XO Biceps               XO Triceps               XO Upright Row               XO IR               XO ER               XO SA Pulldowns               Blackburns                               Ther Activity               Reaching               Carrying               Lifting               Catching               Throwing                               Modalities               CP  10' 10' 10'   15' 10'

## 2021-01-21 ENCOUNTER — APPOINTMENT (OUTPATIENT)
Dept: PHYSICAL THERAPY | Facility: CLINIC | Age: 66
End: 2021-01-21
Payer: MEDICARE

## 2021-01-25 ENCOUNTER — OFFICE VISIT (OUTPATIENT)
Dept: PHYSICAL THERAPY | Facility: CLINIC | Age: 66
End: 2021-01-25
Payer: MEDICARE

## 2021-01-25 DIAGNOSIS — M75.41 IMPINGEMENT SYNDROME OF RIGHT SHOULDER: Primary | ICD-10-CM

## 2021-01-25 PROCEDURE — 97110 THERAPEUTIC EXERCISES: CPT

## 2021-01-25 PROCEDURE — 97140 MANUAL THERAPY 1/> REGIONS: CPT

## 2021-01-25 PROCEDURE — 97530 THERAPEUTIC ACTIVITIES: CPT

## 2021-01-25 NOTE — PROGRESS NOTES
Daily Note     Today's date: 2021  Patient name: Zunilda Espino  : 1955  MRN: [id number]  Referring provider: Deja Michaud*  Dx:   Encounter Diagnosis     ICD-10-CM    1  Impingement syndrome of right shoulder  M75 41                   Subjective: Patient reports he has constant soreness in the shoulder  He also had increased pain after lying on it during the night  Objective: See treatment diary below      Assessment: Tolerated treatment well  Patient exhibited good technique with therapeutic exercises  Patient c/o tightness when beginning the exercises, and pain at approx 90 degrees with PROM abd  Plan: Continue per plan of care        { {         Manuals    PROM R' S' and E' LF LF LF LF LF KR   STM R' biceps                               Neuro Re-Ed                        Ball Stabilization               Bodyblade               PNF                               Ther Ex                               Pulleys  20X  20X  20X  30X   2/10   Wand: Passive ER 20x  20x ea 10X 20X 20X 10"x10   Supine wand flexion   10X  10X  10X       Scapular Retractions Iso @ wall 20X 20x ea 20X 25X iso @ wall 20X@ wall     Scap Dep  Iso table  20X  20X 20X  25X 20X NV    Prone horiz abd palm down      10X  10X        Prone scaption      10X  10X                       Shoulder Isometrics Abd @ wall 20X  20X  20X  25X       TB High Row               TB Mid Row  L2 2/10  L2 2/10  L2 2/10  L3 2/10    L1 2/10   TB Low Row  L1 2/10  L1 2/10  L1 2/10  L1 2/10   L1  2/10   TB Biceps  L1 2/10  L1 2/10  L1 2/10 L2 2/10 L1 2/10  L1  2/10   TB Triceps  L2 2/10  L2 2/10  L2 2/10 L2 2/10 L1 2/10 L1  2/10   TB Upright Row               TB Flexion               TB Abduction               TB SA Pulldown               TB IR               TB ER               TB Clock               SL ER  2/10  2/10  10X  10X   2/10   SL ABD               SL Flexion               SL Horiz ABD             FWD/LAT Raises               DB OH Press               Ceiling Punches               Hoist Row: S               LAT Pulldown               XO Biceps               XO Triceps               XO Upright Row               XO IR               XO ER               XO SA Pulldowns               Blackburns                               Ther Activity               Reaching               Carrying               Lifting               Catching               Throwing                               Modalities               CP  10' 10' 10'  10' 15' 10'

## 2021-01-28 ENCOUNTER — OFFICE VISIT (OUTPATIENT)
Dept: PHYSICAL THERAPY | Facility: CLINIC | Age: 66
End: 2021-01-28
Payer: MEDICARE

## 2021-01-28 DIAGNOSIS — M75.41 IMPINGEMENT SYNDROME OF RIGHT SHOULDER: Primary | ICD-10-CM

## 2021-01-28 PROCEDURE — 97110 THERAPEUTIC EXERCISES: CPT | Performed by: PHYSICAL THERAPIST

## 2021-01-28 PROCEDURE — 97140 MANUAL THERAPY 1/> REGIONS: CPT | Performed by: PHYSICAL THERAPIST

## 2021-01-28 PROCEDURE — 97112 NEUROMUSCULAR REEDUCATION: CPT | Performed by: PHYSICAL THERAPIST

## 2021-01-28 NOTE — PROGRESS NOTES
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  Objective: See treatment diary below      Assessment: Tolerated treatment well and he continues to have difficult with prone exercises with only partial AROM in scaption and abduction planes  Progressed TE per protocol today  PROM is improved when patient is cued to relax throughout end ranges of motion    Patient demonstrated fatigue post treatment and would benefit from continued PT      Plan: Continue per plan of care  Progress treatment as tolerated         Precautions: RCR 20    8 Weeks:  21+ TB ER/IR, SL ER to PRE, Prone hz abd thumb up, PNF, stabilization  10 weeks:  21 + blackburns, continue to progress PREs         Manuals     Manual stretch R' S' and E' LF LF LF LF 10'    STM R' biceps                             Neuro Re-Ed              MR PNF D1/D2     5'    Ball Stabilization          3'    Bodyblade          2'                                 Ther Ex                             Pulleys  20X  20X  20X  30X 30x    Wand: Passive ER 20x  20x ea 10X 20X     Supine wand flexion   10X  10X  10X      Scapular Retractions Iso @ wall 20X 20x ea 20X 25X iso @ wall 20X@ wall    Scap Dep  Iso table  20X  20X 20X  25X 20X     Prone horiz abd palm down      10X  10X  2x10     Prone scaption      10X  10X  2x10                   Shoulder Isometrics Abd @ wall 20X  20X  20X  25X  25x    TB High Row              TB Mid Row  L2 2/10  L2 2/10  L2 2/10  L3 2/10   L3 2/10    TB Low Row  L1 2/10  L1 2/10  L1 2/10  L1 2/10  L1  2/10    TB Biceps  L1 2/10  L1 2/10  L1 2/10 L2 2/10 L2 2/10    TB Triceps  L2 2/10  L2 2/10  L2 2/10 L2 2/10 L2 2/10    TB Upright Row              TB Flexion              TB Abduction              TB SA Pulldown              TB IR         L1 2/10    TB ER         L1 2/10    TB Clock              SL ER  2/10  2/10  10X  10X  1# 2/10    Ther Activity              Reaching              Carrying              Lifting              Catching              Throwing                             Modalities              CP  10' 10' 10'  10' 10'

## 2021-02-01 ENCOUNTER — APPOINTMENT (OUTPATIENT)
Dept: PHYSICAL THERAPY | Facility: CLINIC | Age: 66
End: 2021-02-01
Payer: MEDICARE

## 2021-02-02 ENCOUNTER — APPOINTMENT (OUTPATIENT)
Dept: PHYSICAL THERAPY | Facility: CLINIC | Age: 66
End: 2021-02-02
Payer: MEDICARE

## 2021-02-04 ENCOUNTER — OFFICE VISIT (OUTPATIENT)
Dept: PHYSICAL THERAPY | Facility: CLINIC | Age: 66
End: 2021-02-04
Payer: MEDICARE

## 2021-02-04 DIAGNOSIS — M75.41 IMPINGEMENT SYNDROME OF RIGHT SHOULDER: Primary | ICD-10-CM

## 2021-02-04 PROCEDURE — 97110 THERAPEUTIC EXERCISES: CPT

## 2021-02-04 PROCEDURE — 97112 NEUROMUSCULAR REEDUCATION: CPT

## 2021-02-04 PROCEDURE — 97140 MANUAL THERAPY 1/> REGIONS: CPT

## 2021-02-04 NOTE — PROGRESS NOTES
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  Pt  does note soreness in his arm today especially biceps  Objective: See treatment diary below      Assessment: Tolerated treatment well  Patient demonstrated fatigue post treatment, exhibited good technique with therapeutic exercises and would benefit from continued PT  PROM limited at all end ranges with ER being the >st  Tenderness noted bicep area with eccentric PROM  Plan: Progress treament per protocol        Precautions: RCR 20    8 Weeks:  21+ TB ER/IR, SL ER to PRE, Prone hz abd thumb up, PNF, stabilization  10 weeks:  21 + blackburns, continue to progress PREs         Manuals  2   Manual stretch R' S' and E' LF LF LF LF 10' 10'   STM R' biceps                             Neuro Re-Ed              MR PNF D1/D2     5' 5'   Ball Stabilization          3' NV   Bodyblade          2' 30"x2 ways                                Ther Ex                             Pulleys  20X  20X  20X  30X 30x 30x   Wand: Passive ER 20x  20x ea 10X 20X  20x   Supine wand flexion   10X  10X  10X   10x   Scapular Retractions Iso @ wall 20X 20x ea 20X 25X iso @ wall 20X@ wall 25x  @  wall   Scap Dep  Iso table  20X  20X 20X  25X 20X 20x    Prone horiz abd palm down      10X  10X  2x10 2/10    Prone scaption      10X  10X  2x10 2/10                  Shoulder Isometrics Abd @ wall 20X  20X  20X  25X  25x 25x   TB High Row              TB Mid Row  L2 2/10  L2 2/10  L2 2/10  L3 2/10   L3 2/10 L3  30x   TB Low Row  L1 2/10  L1 2/10  L1 2/10  L1 2/10  L1  2/10 L3  2/10   TB Biceps  L1 2/10  L1 2/10  L1 2/10 L2 2/10 L2 2/10 L3  30x   TB Triceps  L2 2/10  L2 2/10  L2 2/10 L2 2/10 L2 2/10 L3  30x   TB Upright Row              TB Flexion              TB Abduction              TB SA Pulldown              TB IR         L1 2/10 L2  2/10   TB ER         L1 2/10 L2  2/10   TB Clock              SL ER  2/10  2/10  10X  10X  1# 2/10 1# 2/10   Ther Activity              Reaching              Carrying              Lifting              Catching              Throwing                             Modalities              CP  10' 10' 10'  10' 10' 10'

## 2021-02-08 ENCOUNTER — OFFICE VISIT (OUTPATIENT)
Dept: PHYSICAL THERAPY | Facility: CLINIC | Age: 66
End: 2021-02-08
Payer: MEDICARE

## 2021-02-08 DIAGNOSIS — M75.41 IMPINGEMENT SYNDROME OF RIGHT SHOULDER: Primary | ICD-10-CM

## 2021-02-08 PROCEDURE — 97112 NEUROMUSCULAR REEDUCATION: CPT

## 2021-02-08 PROCEDURE — 97110 THERAPEUTIC EXERCISES: CPT

## 2021-02-08 PROCEDURE — 97140 MANUAL THERAPY 1/> REGIONS: CPT

## 2021-02-08 NOTE — PROGRESS NOTES
Daily Note     Today's date: 2021  Patient name: Memo Pacheco  : 1955  MRN: [id number]  Referring provider: Tena Gill*  Dx:   Encounter Diagnosis     ICD-10-CM    1  Impingement syndrome of right shoulder  M75 41                   Subjective: Pt  reports his shoulder is sore today  Objective: See treatment diary below      Assessment: Tolerated treatment well  Patient demonstrated fatigue post treatment, exhibited good technique with therapeutic exercises and would benefit from continued PT  ROM improving as pain decreasing  Plan: Progress treatment as tolerated         Precautions: RCR 20    8 Weeks:  21+ TB ER/IR, SL ER to PRE, Prone hz abd thumb up, PNF, stabilization  10 weeks:  21 + blackburns, continue to progress PREs         Manuals  2/4   Manual stretch R' S' and E' KR LF LF LF 10' 10'   STM R' biceps                             Neuro Re-Ed              MR PNF D1/D2 2/10    5' 5'   Ball Stabilization 2'        3' NV   Bodyblade 30"x2   ways        2' 30"x2 ways                                Ther Ex                             Pulleys 30X  20X  20X  30X 30x 30x   Wand: Passive ER 20x  20x ea 10X 20X  20x   Supine wand flexion 2/10 10X  10X  10X   10x   Scapular Retractions Iso @ wall 20X 20x ea 20X 25X iso @ wall 20X@ wall 25x  @  wall   Scap Dep  Iso table  20X  20X 20X  25X 20X 20x    Prone horiz abd palm down  2/10    10X  10X  2x10 2/10    Prone scaption  10x    10X  10X  2x10 2/10                  Shoulder Isometrics Abd @ wall L2 2/10  20X  20X  25X  25x 25x   TB High Row              TB Mid Row  L3 2/10  L2 2/10  L2 2/10  L3 2/10   L3 2/10 L3  30x   TB Low Row  L3 2/10  L1 2/10  L1 2/10  L1 2/10  L1  2/10 L3  2/10   TB Biceps  L3 2/10  L1 2/10  L1 2/10 L2 2/10 L2 2/10 L3  30x   TB Triceps  L3 2/10  L2 2/10  L2 2/10 L2 2/10 L2 2/10 L3  30x   TB Upright Row              TB Flexion                            TB SA Pulldown              TB IR  L2  2/10       L1 2/10 L2  2/10   TB ER  L2  2/10       L1 2/10 L2  2/10   TB Clock              SL ER  2/10  2/10  10X  10X  1# 2/10 1# 2/10   Ther Activity              Reaching              Carrying              Lifting              Catching              Throwing                             Modalities              CP  10' 10' 10'  10' 10' 10'

## 2021-02-11 ENCOUNTER — OFFICE VISIT (OUTPATIENT)
Dept: PHYSICAL THERAPY | Facility: CLINIC | Age: 66
End: 2021-02-11
Payer: MEDICARE

## 2021-02-11 DIAGNOSIS — M75.41 IMPINGEMENT SYNDROME OF RIGHT SHOULDER: Primary | ICD-10-CM

## 2021-02-11 PROCEDURE — 97110 THERAPEUTIC EXERCISES: CPT

## 2021-02-11 PROCEDURE — 97112 NEUROMUSCULAR REEDUCATION: CPT

## 2021-02-11 PROCEDURE — 97140 MANUAL THERAPY 1/> REGIONS: CPT

## 2021-02-11 NOTE — PROGRESS NOTES
Daily Note     Today's date: 2021  Patient name: Keshia Wilson  : 1955  MRN: [id number]  Referring provider: Jean Longoria*  Dx:   Encounter Diagnosis     ICD-10-CM    1  Impingement syndrome of right shoulder  M75 41                   Subjective: Pt  reports his shoulder is a little sore today, but not bad  Objective: See treatment diary below      Assessment: Tolerated treatment well  Patient exhibited good technique with therapeutic exercises and would benefit from continued PT  Pt  A/PROM continues to improve as pain is decreasing  ER remains most limited  Added PB supine overhead  Plan: Progress treatment as tolerated         Precautions: RCR 20    8 Weeks:  21+ TB ER/IR, SL ER to PRE, Prone hz abd thumb up, PNF, stabilization  10 weeks:  21 + blackburns, continue to progress PREs         Manuals  2/4   Manual stretch R' S' and E' KR KR LF LF 10' 10'   STM R' biceps                             Neuro Re-Ed              MR PNF D1/D2 2/10 2/10   5' 5'   Ball Stabilization 2' 2'      3' NV   Bodyblade 30"x2   ways 30"x2  ways      2' 30"x2 ways                                Ther Ex                             Pulleys 30X 30X  20X  30X 30x 30x   Wand: Passive ER 20x  20x ea 10X 20X  20x   Supine wand flexion 2/10 PB 10x  10X  10X   10x   Scapular Retractions Iso @ wall 20X 20x ea 20X 25X iso @ wall 20X@ wall 25x  @  wall   Scap Dep  Iso table  20X 20X 20X  25X 20X 20x    Prone horiz abd palm down  2/10 2/10  10X  10X  2x10 2/10    Prone scaption  10x 2/10  10X  10X  2x10 2/10                  Shoulder Isometrics Abd @ wall L2 2/10 L2 2/10  20X  25X  25x 25x   TB High Row              TB Mid Row  L3 2/10  L3 2/10  L2 2/10  L3 2/10   L3 2/10 L3  30x   TB Low Row  L3 2/10  L3 2/10  L1 2/10  L1 2/10  L1  2/10 L3  2/10   TB Biceps  L3 2/10  L3 2/10  L1 2/10 L2 2/10 L2 2/10 L3  30x   TB Triceps  L3 2/10  L3 2/10  L2 2/10 L2 2/10 L2 2/10 L3  30x   TB Upright Row              TB Flexion                            TB SA Pulldown              TB IR  L2  2/10 L2  2/10     L1 2/10 L2  2/10   TB ER  L2  2/10 L2 2/10     L1 2/10 L2  2/10   TB Clock    NV          SL ER  2/10  1# 2/10  10X  10X  1# 2/10 1# 2/10   Ther Activity              Reaching              Carrying              Lifting              Catching              Throwing                             Modalities              CP  10' 10' 10'  10' 10' 10'

## 2021-02-15 ENCOUNTER — OFFICE VISIT (OUTPATIENT)
Dept: PHYSICAL THERAPY | Facility: CLINIC | Age: 66
End: 2021-02-15
Payer: MEDICARE

## 2021-02-15 DIAGNOSIS — M75.41 IMPINGEMENT SYNDROME OF RIGHT SHOULDER: Primary | ICD-10-CM

## 2021-02-15 PROCEDURE — 97110 THERAPEUTIC EXERCISES: CPT | Performed by: PHYSICAL THERAPIST

## 2021-02-15 PROCEDURE — 97140 MANUAL THERAPY 1/> REGIONS: CPT | Performed by: PHYSICAL THERAPIST

## 2021-02-15 PROCEDURE — 97112 NEUROMUSCULAR REEDUCATION: CPT | Performed by: PHYSICAL THERAPIST

## 2021-02-15 NOTE — PROGRESS NOTES
Daily Note     Today's date: 2/15/2021  Patient name: Keisha Gomez  : 1955  MRN: [id number]  Referring provider: Delmy Xavier*  Dx:   Encounter Diagnosis     ICD-10-CM    1  Impingement syndrome of right shoulder  M75 41                   Subjective: Patient states that his shoulder has been sore the past few days  His discomfort radiates up ginna the neck and into the last 2 fingers of his right hand  Objective: See treatment diary below      Assessment: Capsular tightness noted throughout PROM, with shoulder discomfort reproduced at end range in all planes  Patient completed program without any worsening of shoulder or arm soreness  Plan: Continue as per protocol       Precautions: RCR 20    8 Weeks:  21+ TB ER/IR, SL ER to PRE, Prone hz abd thumb up, PNF, stabilization  10 weeks:  21 + blackburns, continue to progress PREs         Manuals 2/8 2/11 2/15 1/25 1/28 2/4   Manual stretch R' S' and E' KR KR RK LF 10' 10'   STM R' biceps                             Neuro Re-Ed              MR PNF D1/D2 2/10 2/10   5' 5'   Ball Stabilization 2' 2'  2'    3' NV   Bodyblade 30"x2   ways 30"x2  ways  30"x 2 ways    2' 30"x2 ways   C-T retraction and extension sitting      10x ea                       Ther Ex                             Pulleys 30X 30X  20X  30X 30x 30x   Wand: Passive ER 20x  20x ea 10X 20X  20x   Supine wand flexion 2/10 PB 10x  10X  10X   10x   Scapular Retractions Iso @ wall 20X 20x ea  25X iso @ wall 20X@ wall 25x  @  wall   Scap Dep  Iso table  20X 20X   25X 20X 20x    Prone horiz abd palm down  2/10 2/10 2/10  10X  2x10 2/10    Prone scaption  10x 10x 2/10  10X  2x10 2/10                  Shoulder Isometrics Abd @ wall L2 2/10 L2 2/10  20X  25X  25x 25x   TB High Row              TB Mid Row  L3 2/10  L3 2/10  L3 2/10  L3 2/10   L3 2/10 L3  30x   TB Low Row  L3 2/10  L3 2/10  L3 2/10  L1 2/10  L1  2/10 L3  2/10   TB Biceps  L3 2/10  L3 2/10  L3 2/10 L2 2/10 L2 2/10 L3  30x   TB Triceps  L3 2/10  L3 2/10  L3 2/10 L2 2/10 L2 2/10 L3  30x   TB Upright Row              TB Flexion                            TB SA Pulldown              TB IR  L2  2/10 L2  2/10  L2 2/10   L1 2/10 L2  2/10   TB ER  L2  2/10 L2 2/10  L2 2/10   L1 2/10 L2  2/10   TB Clock    NV          SL ER  2/10  1# 2/10  1# 21/0  10X  1# 2/10 1# 2/10   Ther Activity              Reaching              Carrying              Lifting              Catching              Throwing                             Modalities              CP  10' 10' 10'  10' 10' 10'

## 2021-02-16 NOTE — PROGRESS NOTES
PT Re-Evaluation     Today's date: 2020  Patient name: Amarilys Allison  : 1955  MRN: [id number]  Referring provider: Amie John*  Dx:   No diagnosis found  Assessment  Assessment details:   CURRENT FUNCTIONAL STATUS    Dressing tolerance: Independent  Bathing/washing hair tolerance: Fair with right arm  Able to reach below shoulder level with right arm  Able to lift 1 lb with right arm  Work Status: Off    SHORT TERM GOALS (2 WEEKS)    Increase shoulder AROM and PROM by 10-20 degrees  Decrease pain to 0-4/10  Dressing tolerance: Independent  Bathing/washing hair tolerance: Fair/Good with right arm  Able to reach above shoulder level with right arm  Able to lift 2 lbs with right arm  Work Status: Off    LONG TERM GOALS (DISCHARGE)    Shoulder AROM: F=170 deg, ABD=180 deg, ER=45 deg, IR BTB=T-L JCT  - proressing  Shoulder Strength: F=57 lbs, ABD=53 lbs without pain, ER=25 lbs without pain, IR=43 lbs  - progressing   Decrease pain to 0-2/10  - progressing   Bathing/washing hair tolerance: Good with right arm  - progressing    Able to reach overhead with the right arm  - progressing   Able to lift 10 lbs above shoulder level with the right arm  - not met   Work Status: Full Duty - not met  Understanding of Dx/Px/POC: good   Prognosis: good    Goals  See assessment details above  Plan  Plan details: Amarilys Allison is a 76 y o  male presenting to PT with pain, decreased range of motion, decreased strength, and decreased tolerance to activity  This patient would benefit from skilled PT services to address these issues and to maximize function  He is now six week post op and progressing well towards all goals  He will continue to benefit from skilled PT to address remaining deficits         Patient would benefit from: skilled physical therapy  Planned modality interventions: unattended electrical stimulation, thermotherapy: hydrocollator packs and cryotherapy  Planned therapy interventions: manual therapy, neuromuscular re-education, therapeutic activities and therapeutic exercise  Frequency: 2x week  Duration in weeks: 4        Subjective Evaluation    History of Present Illness  Mechanism of injury: Subjective: The patient's right shoulder pain  Pain  Current pain rating: 3  At best pain ratin  At worst pain ratin    Hand dominance: right    Patient Goals  Patient goals for therapy: decreased pain, increased motion, increased strength and return to work          Objective     General Comments:      Shoulder Comments   CURRENT OBJECTIVE MEASUREMENTS    Shoulder AROM: F= deg, ABD= deg, ER= deg, IR BTB= , EXT= deg  Shoulder PROM: F= deg, ABD= deg, ER= deg in neutral, IR= deg at 45 deg abd  Shoulder Strength: F= lbs, ABD= lbs, ER= lbs, IR= lbs                            Precautions: RCR 20     8 Weeks:  21+ TB ER/IR, SL ER to PRE, Prone hz abd thumb up, PNF, stabilization  10 weeks:  21 + blackburns, continue to progress PREs         Manuals 2/8 2/11 2/15 2/18 1/28 2/4   Manual stretch R' S' and E' KR KR RK RK 10' 10'   STM R' biceps                               Neuro Re-Ed               MR PNF D1/D2 2/10 2/10     5' 5'   Ball Stabilization 2' 2'  2'  2"  3' NV   Bodyblade 30"x2   ways 30"x2  ways  30"x 2 ways  30"x2 ways  2' 30"x2 ways   C-T retraction and extension sitting      10x ea 10x ea                       Ther Ex                               Pulleys 30X 30X  20X  30X 30x 30x   Wand: Passive ER 20x  20x ea 10X 20X   20x   Supine wand flexion 2/10 PB 10x  10X  PB 10X   10x   Scapular Retractions Iso @ wall 20X 20x ea   20x 20X@ wall 25x  @  wall   Scap Dep  Iso table  20X 20X   20X 20X 20x    Prone horiz abd palm down  2/10 2/10 210 2/10  2x10 2/10    Prone scaption  10x 10x 2/10  10  2x10 2/10                   Shoulder Isometrics Abd @ wall L2 2/10 L2 210  20X  20X  25x 25x   TB High Row               TB Mid Row  L3 2/10  L3 2/10  L3 2/10  L3 2/10   L3 2/10 L3  30x   TB Low Row  L3 2/10  L3 2/10  L3 2/10  L3 2/10  L1  2/10 L3  2/10   TB Biceps  L3 2/10  L3 2/10  L3 2/10 L3 2/10 L2 2/10 L3  30x   TB Triceps  L3 2/10  L3 2/10  L3 2/10 L3 2/10 L2 2/10 L3  30x   TB Upright Row               TB Flexion                               TB SA Pulldown               TB IR  L2  2/10 L2  2/10  L2 2/10  L2 2/10 L1 2/10 L2  2/10   TB ER  L2  2/10 L2 2/10  L2 2/10  L2 2/10 L1 2/10 L2  2/10   TB Clock    NV           SL ER  2/10  1# 2/10  1# 2/10  1# 2/10  1# 2/10 1# 2/10   Ther Activity               Reaching               Carrying               Lifting               Catching               Throwing                               Modalities               CP  10' 10' 10'  10' 10' 10'

## 2021-02-17 ENCOUNTER — EVALUATION (OUTPATIENT)
Dept: PHYSICAL THERAPY | Facility: CLINIC | Age: 66
End: 2021-02-17
Payer: MEDICARE

## 2021-02-17 ENCOUNTER — LAB (OUTPATIENT)
Dept: LAB | Facility: MEDICAL CENTER | Age: 66
End: 2021-02-17
Payer: MEDICARE

## 2021-02-17 DIAGNOSIS — T81.718S IATROGENIC PULMONARY EMBOLISM AND INFARCTION, SEQUELA (HCC): ICD-10-CM

## 2021-02-17 DIAGNOSIS — I25.10 ATHEROSCLEROSIS OF NATIVE CORONARY ARTERY OF NATIVE HEART WITHOUT ANGINA PECTORIS: ICD-10-CM

## 2021-02-17 DIAGNOSIS — M47.817 LUMBOSACRAL SPONDYLOSIS WITHOUT MYELOPATHY: ICD-10-CM

## 2021-02-17 DIAGNOSIS — I26.99 IATROGENIC PULMONARY EMBOLISM AND INFARCTION, SEQUELA (HCC): ICD-10-CM

## 2021-02-17 DIAGNOSIS — E78.2 MIXED HYPERLIPIDEMIA: ICD-10-CM

## 2021-02-17 DIAGNOSIS — M75.41 IMPINGEMENT SYNDROME OF RIGHT SHOULDER: Primary | ICD-10-CM

## 2021-02-17 LAB
ALBUMIN SERPL BCP-MCNC: 4 G/DL (ref 3.5–5)
ALP SERPL-CCNC: 45 U/L (ref 46–116)
ALT SERPL W P-5'-P-CCNC: 31 U/L (ref 12–78)
ANION GAP SERPL CALCULATED.3IONS-SCNC: 5 MMOL/L (ref 4–13)
AST SERPL W P-5'-P-CCNC: 36 U/L (ref 5–45)
BASOPHILS # BLD AUTO: 0.03 THOUSANDS/ΜL (ref 0–0.1)
BASOPHILS NFR BLD AUTO: 0 % (ref 0–1)
BILIRUB SERPL-MCNC: 0.85 MG/DL (ref 0.2–1)
BUN SERPL-MCNC: 19 MG/DL (ref 5–25)
CALCIUM SERPL-MCNC: 9.5 MG/DL (ref 8.3–10.1)
CHLORIDE SERPL-SCNC: 105 MMOL/L (ref 100–108)
CHOLEST SERPL-MCNC: 129 MG/DL (ref 50–200)
CO2 SERPL-SCNC: 29 MMOL/L (ref 21–32)
CREAT SERPL-MCNC: 1.16 MG/DL (ref 0.6–1.3)
EOSINOPHIL # BLD AUTO: 0.5 THOUSAND/ΜL (ref 0–0.61)
EOSINOPHIL NFR BLD AUTO: 8 % (ref 0–6)
ERYTHROCYTE [DISTWIDTH] IN BLOOD BY AUTOMATED COUNT: 14.3 % (ref 11.6–15.1)
GFR SERPL CREATININE-BSD FRML MDRD: 65 ML/MIN/1.73SQ M
GLUCOSE P FAST SERPL-MCNC: 116 MG/DL (ref 65–99)
HCT VFR BLD AUTO: 49.6 % (ref 36.5–49.3)
HDLC SERPL-MCNC: 41 MG/DL
HGB BLD-MCNC: 16.8 G/DL (ref 12–17)
IMM GRANULOCYTES # BLD AUTO: 0.01 THOUSAND/UL (ref 0–0.2)
IMM GRANULOCYTES NFR BLD AUTO: 0 % (ref 0–2)
LDLC SERPL CALC-MCNC: 56 MG/DL (ref 0–100)
LYMPHOCYTES # BLD AUTO: 2.65 THOUSANDS/ΜL (ref 0.6–4.47)
LYMPHOCYTES NFR BLD AUTO: 40 % (ref 14–44)
MCH RBC QN AUTO: 29.9 PG (ref 26.8–34.3)
MCHC RBC AUTO-ENTMCNC: 33.9 G/DL (ref 31.4–37.4)
MCV RBC AUTO: 88 FL (ref 82–98)
MONOCYTES # BLD AUTO: 0.77 THOUSAND/ΜL (ref 0.17–1.22)
MONOCYTES NFR BLD AUTO: 12 % (ref 4–12)
NEUTROPHILS # BLD AUTO: 2.74 THOUSANDS/ΜL (ref 1.85–7.62)
NEUTS SEG NFR BLD AUTO: 40 % (ref 43–75)
NONHDLC SERPL-MCNC: 88 MG/DL
NRBC BLD AUTO-RTO: 0 /100 WBCS
PLATELET # BLD AUTO: 244 THOUSANDS/UL (ref 149–390)
PMV BLD AUTO: 11.8 FL (ref 8.9–12.7)
POTASSIUM SERPL-SCNC: 4.3 MMOL/L (ref 3.5–5.3)
PROT SERPL-MCNC: 7 G/DL (ref 6.4–8.2)
RBC # BLD AUTO: 5.61 MILLION/UL (ref 3.88–5.62)
SODIUM SERPL-SCNC: 139 MMOL/L (ref 136–145)
TRIGL SERPL-MCNC: 160 MG/DL
TSH SERPL DL<=0.05 MIU/L-ACNC: 2.93 UIU/ML (ref 0.36–3.74)
WBC # BLD AUTO: 6.7 THOUSAND/UL (ref 4.31–10.16)

## 2021-02-17 PROCEDURE — 97110 THERAPEUTIC EXERCISES: CPT | Performed by: PHYSICAL THERAPIST

## 2021-02-17 PROCEDURE — 80061 LIPID PANEL: CPT

## 2021-02-17 PROCEDURE — 84443 ASSAY THYROID STIM HORMONE: CPT

## 2021-02-17 PROCEDURE — 36415 COLL VENOUS BLD VENIPUNCTURE: CPT

## 2021-02-17 PROCEDURE — 97140 MANUAL THERAPY 1/> REGIONS: CPT | Performed by: PHYSICAL THERAPIST

## 2021-02-17 PROCEDURE — 80053 COMPREHEN METABOLIC PANEL: CPT

## 2021-02-17 PROCEDURE — 85025 COMPLETE CBC W/AUTO DIFF WBC: CPT

## 2021-02-17 NOTE — LETTER
2021    Edis Velasquez MD  [address] [facility] [address]    Patient: Zunilda Espino   YOB: 1955   Date of Visit: 2021     Encounter Diagnosis     ICD-10-CM    1  Impingement syndrome of right shoulder  M75 41        Dear Dr Svetlana Clifford:    Thank you for your recent referral of Zunilda Espino  Please review the attached evaluation summary from Javy's recent visit  Please verify that you agree with the plan of care by signing the attached order  If you have any questions or concerns, please do not hesitate to call  I sincerely appreciate the opportunity to share in the care of one of your patients and hope to have another opportunity to work with you in the near future  Sincerely,    Rocio Mcmillan, PT      Referring Provider:      I certify that I have read the below Plan of Care and certify the need for these services furnished under this plan of treatment while under my care  Edis Velasquez MD  [address] [facility] [address]  [address] [place]          PT Re-Evaluation     Today's date: 2021  Patient name: Zunilda Espino  : 1955  MRN: [id number]  Referring provider: Deja Michaud*  Dx:   Encounter Diagnosis     ICD-10-CM    1  Impingement syndrome of right shoulder  M75 41                   Assessment  Assessment details:   CURRENT FUNCTIONAL STATUS    Dressing tolerance: Independent  Bathing/washing hair tolerance: Poor with right arm  Able to reach to shoulder level with right arm  Able to lift several lbs with right arm  Work Status: Off    SHORT TERM GOALS (2 WEEKS)    Increase shoulder AROM and PROM by 10-20 degrees  Decrease pain to 2-6/10  Dressing tolerance: Independent  Bathing/washing hair tolerance: Fair with right arm  Able to reach above shoulder level with right arm  Able to lift 5 lbs with right arm      Work Status: Off    LONG TERM GOALS (DISCHARGE)    Shoulder AROM: F=150 deg, ABD=160 deg, ER=45 deg, IR BTB=T-L JCT  - progressing  Shoulder PROM: F=155 deg, ABD=165 deg, ER=50 deg, IR=60 deg  - progressing  Shoulder Strength: F=57 lbs, ABD=53 lbs, ER=25 lbs, IR=43 lbs  - progressing   Decrease pain to 0-2/10  - progressing   Bathing/washing hair tolerance: Good with right arm  - progressing    Able to reach overhead with the right arm  - progressing   Able to lift 10 lbs above shoulder level with the right arm  - progressing  Work Status: Full Duty - not met    Understanding of Dx/Px/POC: good   Prognosis: good    Goals  See assessment details above  Plan  Plan details: The patient has shown improvement in PT demonstrating decreased pain, increased range of motion, increased strength, and increased tolerance to activity  The patient continues to present with pain, decreased ROM, decreased strength, and decreased tolerance to activity  The patient would benefit from continued skilled PT services to address these issues and to maximize function  The patient will also continue performing their HEP  Patient would benefit from: skilled physical therapy  Planned modality interventions: unattended electrical stimulation, thermotherapy: hydrocollator packs and cryotherapy  Planned therapy interventions: manual therapy, neuromuscular re-education, therapeutic activities and therapeutic exercise  Frequency: 2x week  Duration in weeks: 4        Subjective Evaluation    History of Present Illness  Mechanism of injury: Subjective The patient's right shoulder pain remains constant, with sharp twinges of pain occasionally occurring with movement and at rest  He can reach to shoulder level  He has much difficulty trying to wash his hair with his right arm    Pain  Current pain ratin  At best pain ratin  At worst pain ratin    Hand dominance: right    Patient Goals  Patient goals for therapy: decreased pain, increased motion, increased strength and return to work          Objective     General Comments:      Shoulder Comments   CURRENT OBJECTIVE MEASUREMENTS    Shoulder AROM: F=110 deg, ABD=105 deg, ER=25 deg, IR BTB=Posterior Hip  Shoulder PROM: F=120 deg, ABD=110 deg, ER=30 deg, IR=40 deg    Shoulder Strength: F=41 lbs, ABD=33 lbs, ER=8 lbs, IR=33 lbs                Precautions: RCR 11/30/20     8 Weeks:  1/25/21+ TB ER/IR, SL ER to PRE, Prone hz abd thumb up, PNF, stabilization  10 weeks:  2/8/21 + blackburns, continue to progress PREs         Manuals 2/8 2/11 2/15 2/17 1/28 2/4   Manual stretch R' S' and E' KR KR RK RK 10' 10'   STM R' biceps                               Neuro Re-Ed               MR PNF D1/D2 2/10 2/10     5' 5'   Ball Stabilization 2' 2'  2'  2"  3' NV   Bodyblade 30"x2   ways 30"x2  ways  30"x 2 ways  30"x2 ways  2' 30"x2 ways   C-T retraction and extension sitting      10x ea                         Ther Ex                               Pulleys 30X 30X  20X  30X 30x 30x   Wand: Passive ER 20x  20x ea 10X 20X   20x   Supine wand flexion 2/10 PB 10x  10X  10X   10x   Scapular Retractions Iso @ wall 20X 20x ea    20X@ wall 25x  @  wall   Scap Dep  Iso table  20X 20X    20X 20x    Prone horiz abd palm down  2/10 2/10 2/10  2/10  2x10 2/10    Prone scaption  10x 10x 2/10  2/10  2x10 2/10                   Shoulder Isometrics Abd @ wall L2 2/10 L2 2/10  20X    25x 25x   TB High Row               TB Mid Row  L3 2/10  L3 2/10  L3 2/10  L3 2/10   L3 2/10 L3  30x   TB Low Row  L3 2/10  L3 2/10  L3 2/10  L3 2/10  L1  2/10 L3  2/10   TB Biceps  L3 2/10  L3 2/10  L3 2/10 L3 2/10 L2 2/10 L3  30x   TB Triceps  L3 2/10  L3 2/10  L3 2/10 L3 2/10 L2 2/10 L3  30x   TB Upright Row               TB Flexion                               TB SA Pulldown               TB IR  L2  2/10 L2  2/10  L2 2/10  L2 2/10 L1 2/10 L2  2/10   TB ER  L2  2/10 L2 2/10  L2 2/10  L2 2/10 L1 2/10 L2  2/10   TB Clock    NV           SL ER  2/10  1# 2/10  1# 21/0  1# 2/10  1# 2/10 1# 2/10   Ther Activity               Reaching               Carrying               Lifting               Catching               Throwing                               Modalities               CP  10' 10' 10'  10' 10' 10'

## 2021-02-17 NOTE — PROGRESS NOTES
PT Re-Evaluation     Today's date: 2021  Patient name: Venkat Lindo  : 1955  MRN: [id number]  Referring provider: Brandan Howard*  Dx:   Encounter Diagnosis     ICD-10-CM    1  Impingement syndrome of right shoulder  M75 41                   Assessment  Assessment details:   CURRENT FUNCTIONAL STATUS    Dressing tolerance: Independent  Bathing/washing hair tolerance: Poor with right arm  Able to reach to shoulder level with right arm  Able to lift several lbs with right arm  Work Status: Off    SHORT TERM GOALS (2 WEEKS)    Increase shoulder AROM and PROM by 10-20 degrees  Decrease pain to 2-6/10  Dressing tolerance: Independent  Bathing/washing hair tolerance: Fair with right arm  Able to reach above shoulder level with right arm  Able to lift 5 lbs with right arm  Work Status: Off    LONG TERM GOALS (DISCHARGE)    Shoulder AROM: F=150 deg, ABD=160 deg, ER=45 deg, IR BTB=T-L JCT  - progressing  Shoulder PROM: F=155 deg, ABD=165 deg, ER=50 deg, IR=60 deg  - progressing  Shoulder Strength: F=57 lbs, ABD=53 lbs, ER=25 lbs, IR=43 lbs  - progressing   Decrease pain to 0-2/10  - progressing   Bathing/washing hair tolerance: Good with right arm  - progressing    Able to reach overhead with the right arm  - progressing   Able to lift 10 lbs above shoulder level with the right arm  - progressing  Work Status: Full Duty - not met    Understanding of Dx/Px/POC: good   Prognosis: good    Goals  See assessment details above  Plan  Plan details: The patient has shown improvement in PT demonstrating decreased pain, increased range of motion, increased strength, and increased tolerance to activity  The patient continues to present with pain, decreased ROM, decreased strength, and decreased tolerance to activity  The patient would benefit from continued skilled PT services to address these issues and to maximize function   The patient will also continue performing their HEP         Patient would benefit from: skilled physical therapy  Planned modality interventions: unattended electrical stimulation, thermotherapy: hydrocollator packs and cryotherapy  Planned therapy interventions: manual therapy, neuromuscular re-education, therapeutic activities and therapeutic exercise  Frequency: 2x week  Duration in weeks: 4        Subjective Evaluation    History of Present Illness  Mechanism of injury: Subjective The patient's right shoulder pain remains constant, with sharp twinges of pain occasionally occurring with movement and at rest  He can reach to shoulder level  He has much difficulty trying to wash his hair with his right arm  Pain  Current pain ratin  At best pain ratin  At worst pain ratin    Hand dominance: right    Patient Goals  Patient goals for therapy: decreased pain, increased motion, increased strength and return to work          Objective     General Comments:      Shoulder Comments   CURRENT OBJECTIVE MEASUREMENTS    Shoulder AROM: F=110 deg, ABD=105 deg, ER=25 deg, IR BTB=Posterior Hip  Shoulder PROM: F=120 deg, ABD=110 deg, ER=30 deg, IR=40 deg    Shoulder Strength: F=41 lbs, ABD=33 lbs, ER=8 lbs, IR=33 lbs                Precautions: RCR 20     8 Weeks:  21+ TB ER/IR, SL ER to PRE, Prone hz abd thumb up, PNF, stabilization  10 weeks:  21 + blackburns, continue to progress PREs         Manuals 2/8 2/11 2/15 2/17 1/28 2/4   Manual stretch R' S' and E' KR KR RK RK 10' 10'   STM R' biceps                               Neuro Re-Ed               MR PNF D1/D2 2/10 2/10     5' 5'   Ball Stabilization 2' 2'  2'  2"  3' NV   Bodyblade 30"x2   ways 30"x2  ways  30"x 2 ways  30"x2 ways  2' 30"x2 ways   C-T retraction and extension sitting      10x ea                         Ther Ex                               Pulleys 30X 30X  20X  30X 30x 30x   Wand: Passive ER 20x  20x ea 10X 20X   20x   Supine wand flexion 2/10 PB 10x  10X  10X   10x   Scapular Retractions Iso @ wall 20X 20x ea    20X@ wall 25x  @  wall   Scap Dep  Iso table  20X 20X    20X 20x    Prone horiz abd palm down  2/10 2/10 2/10  2/10  2x10 2/10    Prone scaption  10x 10x 2/10  2/10  2x10 2/10                   Shoulder Isometrics Abd @ wall L2 2/10 L2 2/10  20X    25x 25x   TB High Row               TB Mid Row  L3 2/10 [facility] 2/10  L3 2/10  L3 2/10   L3 2/10 L3  30x   TB Low Row  L3 2/10  L3 2/10  L3 2/10  L3 2/10  L1  2/10 L3  2/10   TB Biceps  L3 2/10  L3 2/10  L3 2/10 L3 2/10 L2 2/10 L3  30x   TB Triceps  L3 2/10  L3 2/10  L3 2/10 L3 2/10 L2 2/10 L3  30x   TB Upright Row               TB Flexion                               TB SA Pulldown               TB IR  L2  2/10 L2  2/10  L2 2/10  L2 2/10 L1 2/10 L2  2/10   TB ER  L2  2/10 L2 2/10  L2 2/10  L2 2/10 L1 2/10 L2  2/10   TB Clock    NV           SL ER  2/10  1# 2/10  1# 21/0  1# 2/10  1# 2/10 1# 2/10   Ther Activity               Reaching               Carrying               Lifting               Catching               Throwing                               Modalities               CP  10' 10' 10'  10' 10' 10'

## 2021-02-18 ENCOUNTER — APPOINTMENT (OUTPATIENT)
Dept: PHYSICAL THERAPY | Facility: CLINIC | Age: 66
End: 2021-02-18
Payer: MEDICARE

## 2021-02-22 ENCOUNTER — OFFICE VISIT (OUTPATIENT)
Dept: PHYSICAL THERAPY | Facility: CLINIC | Age: 66
End: 2021-02-22
Payer: MEDICARE

## 2021-02-22 DIAGNOSIS — M75.41 IMPINGEMENT SYNDROME OF RIGHT SHOULDER: Primary | ICD-10-CM

## 2021-02-22 PROCEDURE — 97110 THERAPEUTIC EXERCISES: CPT | Performed by: PHYSICAL THERAPIST

## 2021-02-22 PROCEDURE — 97140 MANUAL THERAPY 1/> REGIONS: CPT | Performed by: PHYSICAL THERAPIST

## 2021-02-22 NOTE — PROGRESS NOTES
Daily Note     Today's date: 2021  Patient name: Michael Farah  : 1955  MRN: [id number]  Referring provider: Alexis Orosco*  Dx:   Encounter Diagnosis     ICD-10-CM    1  Impingement syndrome of right shoulder  M75 41                   Subjective: Patient states that he is trying to stretch the arm into outward rotation, but it is very tight  He also has difficulty reaching to get his wallet out of his back pocket  Most of his discomfort is in the upper arm  Objective: Functional IR limited to the posterolateral hip  Assessment: Tolerated treatment well  PROM was increased in all plane after MT  Patient exhibited good technique with therapeutic exercise progression of T-band resistance  Plan: Progress treament per protocol           Precautions: RCR 20     8 Weeks:  21+ TB ER/IR, SL ER to PRE, Prone hz abd thumb up, PNF, stabilization  10 weeks:  21 + blackburns, continue to progress PREs         Manuals 2/8 2/11 2/15 2/18 2/22 2   Manual stretch R' S' and E' KR KR RK RK RK 10'   STM R' Biceps/  Lateral arm           RK                     Neuro Re-Ed               MR PNF D1/D2 2/10 2/10      5'   Ball Stabilization 2' 2'  2'  2"  3' NV   Bodyblade 30"x2   ways 30"x2  ways  30"x 2 ways  30"x2 ways  2' 30"x2 ways   C-T retraction and extension sitting      10x ea 10x ea                       Ther Ex                HBB with cane          10x5" TID HEP     Pulleys 30X 30X  20X  30X 30x 30x   Wand: Passive ER 20x  20x ea 10X 20X   20x   Supine wand flexion 2/10 PB 10x  10X  PB 10X  PB 10x 10x   Scapular Retractions Iso @ wall 20X 20x ea   20x  25x  @  wall   Scap Dep  Iso table  20X 20X   20X  20x    Prone horiz abd palm down  2/10 2/10 2/10 2/10  1# 2/10 2/10    Prone scaption  10x 10x 2/10  2/10  1# 2/10 2/10                   Shoulder Isometrics Abd @ wall L2 2/10 L2 2/10  20X  20X  25x   TB High Row               TB Mid Row  L3 2/10  L3 2/10  L3 2/10  L3 2/10  L4 2/10 L3  30x   TB Low Row  L3 2/10  L3 2/10  L3 2/10  L3 2/10  L4  2/10 L3  2/10   TB Biceps  L3 2/10  L3 2/10  L3 2/10 L3 2/10 L4 2/10 L3  30x   TB Triceps  L3 2/10  L3 2/10  L3 2/10 L3 2/10 L4 2/10 L3  30x   TB Upright Row               TB Flexion                               TB SA Pulldown               TB IR  L2  2/10 L2  2/10  L2 2/10  L2 2/10 L4 2/10 L2  2/10   TB ER  L2  2/10 L2 2/10  L2 2/10  L2 2/10 L4 2/10 L2  2/10   TB Clock    NV           SL Flex     1# 2/10    SL ABD     1# 2/10    SL ER  2/10  1# 2/10  1# 2/10  1# 2/10  1# 2/10 1# 2/10   Ther Activity               Reaching               Carrying               Lifting               Catching               Throwing                               Modalities               CP  10' 10' 10'  10' 10' 10'

## 2021-02-23 ENCOUNTER — OFFICE VISIT (OUTPATIENT)
Dept: INTERNAL MEDICINE CLINIC | Facility: CLINIC | Age: 66
End: 2021-02-23
Payer: MEDICARE

## 2021-02-23 VITALS
SYSTOLIC BLOOD PRESSURE: 130 MMHG | WEIGHT: 264 LBS | HEART RATE: 84 BPM | DIASTOLIC BLOOD PRESSURE: 86 MMHG | TEMPERATURE: 96.8 F | OXYGEN SATURATION: 98 % | BODY MASS INDEX: 40.01 KG/M2 | HEIGHT: 68 IN

## 2021-02-23 DIAGNOSIS — Z23 NEED FOR PNEUMOCOCCAL VACCINATION: ICD-10-CM

## 2021-02-23 DIAGNOSIS — I25.10 ATHEROSCLEROSIS OF NATIVE CORONARY ARTERY OF NATIVE HEART WITHOUT ANGINA PECTORIS: Primary | ICD-10-CM

## 2021-02-23 DIAGNOSIS — E55.9 VITAMIN D DEFICIENCY: ICD-10-CM

## 2021-02-23 DIAGNOSIS — R73.01 IMPAIRED FASTING BLOOD SUGAR: ICD-10-CM

## 2021-02-23 DIAGNOSIS — M47.817 LUMBOSACRAL SPONDYLOSIS WITHOUT MYELOPATHY: ICD-10-CM

## 2021-02-23 DIAGNOSIS — E78.2 MIXED HYPERLIPIDEMIA: ICD-10-CM

## 2021-02-23 DIAGNOSIS — I10 BENIGN ESSENTIAL HYPERTENSION: ICD-10-CM

## 2021-02-23 DIAGNOSIS — E66.01 MORBID OBESITY WITH BMI OF 40.0-44.9, ADULT (HCC): ICD-10-CM

## 2021-02-23 PROBLEM — I26.99 IATROGENIC PULMONARY EMBOLISM AND INFARCTION (HCC): Status: RESOLVED | Noted: 2019-09-20 | Resolved: 2021-02-23

## 2021-02-23 PROBLEM — T81.718A IATROGENIC PULMONARY EMBOLISM AND INFARCTION (HCC): Status: RESOLVED | Noted: 2019-09-20 | Resolved: 2021-02-23

## 2021-02-23 PROBLEM — Z00.00 MEDICARE ANNUAL WELLNESS VISIT, SUBSEQUENT: Status: RESOLVED | Noted: 2020-11-06 | Resolved: 2021-02-23

## 2021-02-23 PROCEDURE — 99213 OFFICE O/P EST LOW 20 MIN: CPT | Performed by: NURSE PRACTITIONER

## 2021-02-23 PROCEDURE — G0009 ADMIN PNEUMOCOCCAL VACCINE: HCPCS | Performed by: NURSE PRACTITIONER

## 2021-02-23 PROCEDURE — 90732 PPSV23 VACC 2 YRS+ SUBQ/IM: CPT | Performed by: NURSE PRACTITIONER

## 2021-02-23 RX ORDER — EZETIMIBE 10 MG/1
10 TABLET ORAL DAILY
Qty: 90 TABLET | Refills: 3 | Status: SHIPPED | OUTPATIENT
Start: 2021-02-23 | End: 2022-04-04 | Stop reason: SDUPTHER

## 2021-02-23 RX ORDER — ROSUVASTATIN CALCIUM 20 MG/1
20 TABLET, COATED ORAL DAILY
Qty: 90 TABLET | Refills: 3 | Status: SHIPPED | OUTPATIENT
Start: 2021-02-23 | End: 2022-06-29

## 2021-02-23 NOTE — PATIENT INSTRUCTIONS
Low Fat Diet   AMBULATORY CARE:   A low-fat diet  is an eating plan that is low in total fat, unhealthy fat, and cholesterol  You may need to follow a low-fat diet if you have trouble digesting or absorbing fat  You may also need to follow this diet if you have high cholesterol  You can also lower your cholesterol by increasing the amount of fiber in your diet  Soluble fiber is a type of fiber that helps to decrease cholesterol levels  Different types of fat in food:   · Limit unhealthy fats  A diet that is high in cholesterol, saturated fat, and trans fat may cause unhealthy cholesterol levels  Unhealthy cholesterol levels increase your risk of heart disease  ? Cholesterol:  Limit intake of cholesterol to less than 200 mg per day  Cholesterol is found in meat, eggs, and dairy  ? Saturated fat:  Limit saturated fat to less than 7% of your total daily calories  Ask your dietitian how many calories you need each day  Saturated fat is found in butter, cheese, ice cream, whole milk, and palm oil  Saturated fat is also found in meat, such as beef, pork, chicken skin, and processed meats  Processed meats include sausage, hot dogs, and bologna  ? Trans fat:  Avoid trans fat as much as possible  Trans fat is used in fried and baked foods  Foods that say trans fat free on the label may still have up to 0 5 grams of trans fat per serving  · Include healthy fats  Replace foods that are high in saturated and trans fat with foods high in healthy fats  This may help to decrease high cholesterol levels  ? Monounsaturated fats: These are found in avocados, nuts, and vegetable oils, such as olive, canola, and sunflower oil  ? Polyunsaturated fats: These can be found in vegetable oils, such as soybean or corn oil  Omega-3 fats can help to decrease the risk of heart disease  Omega-3 fats are found in fish, such as salmon, herring, trout, and tuna   Omega-3 fats can also be found in plant foods, such as walnuts, flaxseed, soybeans, and canola oil  Foods to limit or avoid:   · Grains:      ? Snacks that are made with partially hydrogenated oils, such as chips, regular crackers, and butter-flavored popcorn    ? High-fat baked goods, such as biscuits, croissants, doughnuts, pies, cookies, and pastries    · Dairy:      ? Whole milk, 2% milk, and yogurt and ice cream made with whole milk    ? Half and half creamer, heavy cream, and whipping cream    ? Cheese, cream cheese, and sour cream    · Meats and proteins:      ? High-fat cuts of meat (T-bone steak, regular hamburger, and ribs)    ? Fried meat, poultry (turkey and chicken), and fish    ? Poultry (chicken and turkey) with skin    ? Cold cuts (salami or bologna), hot dogs, logan, and sausage    ? Whole eggs and egg yolks    · Vegetables and fruits with added fat:      ? Fried vegetables or vegetables in butter or high-fat sauces, such as cream or cheese sauces    ? Fried fruit or fruit served with butter or cream    · Fats:      ? Butter, stick margarine, and shortening    ? Coconut, palm oil, and palm kernel oil    Foods to include:   · Grains:      ? Whole-grain breads, cereals, pasta, and brown rice    ? Low-fat crackers and pretzels    · Vegetables and fruits:      ? Fresh, frozen, or canned vegetables (no salt or low-sodium)    ? Fresh, frozen, dried, or canned fruit (canned in light syrup or fruit juice)    ? Avocado    · Low-fat dairy products:      ? Nonfat (skim) or 1% milk    ? Nonfat or low-fat cheese, yogurt, and cottage cheese    · Meats and proteins:      ? Chicken or turkey with no skin    ? Baked or broiled fish    ? Lean beef and pork (loin, round, extra lean hamburger)    ? Beans and peas, unsalted nuts, soy products    ? Egg whites and substitutes    ? Seeds and nuts    · Fats:      ? Unsaturated oil, such as canola, olive, peanut, soybean, or sunflower oil    ? Soft or liquid margarine and vegetable oil spread    ?  Low-fat salad dressing    Other ways to decrease fat:   · Read food labels before you buy foods  Choose foods that have less than 30% of calories from fat  Choose low-fat or fat-free dairy products  Remember that fat free does not mean calorie free  These foods still contain calories, and too many calories can lead to weight gain  · Trim fat from meat and avoid fried food  Trim all visible fat from meat before you cook it  Remove the skin from poultry  Do not brennan meat, fish, or poultry  Bake, roast, boil, or broil these foods instead  Avoid fried foods  Eat a baked potato instead of Western Isa fries  Steam vegetables instead of sautéing them in butter  · Add less fat to foods  Use imitation logan bits on salads and baked potatoes instead of regular logan bits  Use fat-free or low-fat salad dressings instead of regular dressings  Use low-fat or nonfat butter-flavored topping instead of regular butter or margarine on popcorn and other foods  Ways to decrease fat in recipes:  Replace high-fat ingredients with low-fat or nonfat ones  This may cause baked goods to be drier than usual  You may need to use nonfat cooking spray on pans to prevent food from sticking  You also may need to change the amount of other ingredients, such as water, in the recipe  Try the following:  · Use low-fat or light margarine instead of regular margarine or shortening  · Use lean ground turkey breast or chicken, or lean ground beef (less than 5% fat) instead of hamburger  · Add 1 teaspoon of canola oil to 8 ounces of skim milk instead of using cream or half and half  · Use grated zucchini, carrots, or apples in breads instead of coconut  · Use blenderized, low-fat cottage cheese, plain tofu, or low-fat ricotta cheese instead of cream cheese  · Use 1 egg white and 1 teaspoon of canola oil, or use ¼ cup (2 ounces) of fat-free egg substitute instead of a whole egg       · Replace half of the oil that is called for in a recipe with applesauce when you bake  Use 3 tablespoons of cocoa powder and 1 tablespoon of canola oil instead of a square of baking chocolate  How to increase fiber:  Eat enough high-fiber foods to get 20 to 30 grams of fiber every day  Slowly increase your fiber intake to avoid stomach cramps, gas, and other problems  · Eat 3 ounces of whole-grain foods each day  An ounce is about 1 slice of bread  Eat whole-grain breads, such as whole-wheat bread  Whole wheat, whole-wheat flour, or other whole grains should be listed as the first ingredient on the food label  Replace white flour with whole-grain flour or use half of each in recipes  Whole-grain flour is heavier than white flour, so you may have to add more yeast or baking powder  · Eat a high-fiber cereal for breakfast   Oatmeal is a good source of soluble fiber  Look for cereals that have bran or fiber in the name  Choose whole-grain products, such as brown rice, barley, and whole-wheat pasta  · Eat more beans, peas, and lentils  For example, add beans to soups or salads  Eat at least 5 cups of fruits and vegetables each day  Eat fruits and vegetables with the peel because the peel is high in fiber  © Copyright [address] Information is for End User's use only and may not be sold, redistributed or otherwise used for commercial purposes  All illustrations and images included in CareNotes® are the copyrighted property of [employer]  or [address] [place] [address]  The above information is an  only  It is not intended as medical advice for individual conditions or treatments  Talk to your doctor, nurse or pharmacist before following any medical regimen to see if it is safe and effective for you  Heart Healthy Diet   AMBULATORY CARE:   A heart healthy diet  is an eating plan low in unhealthy fats and sodium (salt)  The plan is high in healthy fats and fiber   A heart healthy diet helps improve your cholesterol levels and lowers your risk for heart disease and stroke  A dietitian will teach you how to read and understand food labels  Heart healthy diet guidelines to follow:   · Choose foods that contain healthy fats  ? Unsaturated fats  include monounsaturated and polyunsaturated fats  Unsaturated fat is found in foods such as soybean, canola, olive, corn, and safflower oils  It is also found in soft tub margarine that is made with liquid vegetable oil  ? Omega-3 fat  is found in certain fish, such as salmon, tuna, and trout, and in walnuts and flaxseed  Eat fish high in omega-3 fats at least 2 times a week  · Get 20 to 30 grams of fiber each day  Fruits, vegetables, whole-grain foods, and legumes (cooked beans) are good sources of fiber  · Limit or do not have unhealthy fats  ? Cholesterol  is found in animal foods, such as eggs and lobster, and in dairy products made from whole milk  Limit cholesterol to less than 200 mg each day  ? Saturated fat  is found in meats, such as logan and hamburger  It is also found in chicken or turkey skin, whole milk, and butter  Limit saturated fat to less than 7% of your total daily calories  ? Trans fat  is found in packaged foods, such as potato chips and cookies  It is also in hard margarine, some fried foods, and shortening  Do not eat foods that contain trans fats  · Limit sodium as directed  You may be told to limit sodium to 2,000 to 2,300 mg each day  Choose low-sodium or no-salt-added foods  Add little or no salt to food you prepare  Use herbs and spices in place of salt  Include the following in your heart healthy plan:  Ask your dietitian or healthcare provider how many servings to have from each of the following food groups:  · Grains:      ? Whole-wheat breads, cereals, and pastas, and brown rice    ? Low-fat, low-sodium crackers and chips    · Vegetables:      ? Broccoli, green beans, green peas, and spinach    ? Collards, kale, and lima beans    ?  Carrots, sweet potatoes, tomatoes, and peppers    ? Canned vegetables with no salt added    · Fruits:      ? Bananas, peaches, pears, and pineapple    ? Grapes, raisins, and dates    ? Oranges, tangerines, grapefruit, orange juice, and grapefruit juice    ? Apricots, mangoes, melons, and papaya    ? Raspberries and strawberries    ? Canned fruit with no added sugar    · Low-fat dairy:      ? Nonfat (skim) milk, 1% milk, and low-fat almond, cashew, or soy milks fortified with calcium    ? Low-fat cheese, regular or frozen yogurt, and cottage cheese    · Meats and proteins:      ? Lean cuts of beef and pork (loin, leg, round), skinless chicken and turkey    ? Legumes, soy products, egg whites, or nuts    Limit or do not include the following in your heart healthy plan:   · Unhealthy fats and oils:      ? Whole or 2% milk, cream cheese, sour cream, or cheese    ? High-fat cuts of beef (T-bone steaks, ribs), chicken or turkey with skin, and organ meats such as liver    ? Butter, stick margarine, shortening, and cooking oils such as coconut or palm oil    · Foods and liquids high in sodium:      ? Packaged foods, such as frozen dinners, cookies, macaroni and cheese, and cereals with more than 300 mg of sodium per serving    ? Vegetables with added sodium, such as instant potatoes, vegetables with added sauces, or regular canned vegetables    ? Cured or smoked meats, such as hot dogs, logan, and sausage    ? High-sodium ketchup, barbecue sauce, salad dressing, pickles, olives, soy sauce, or miso    · Foods and liquids high in sugar:      ? Candy, cake, cookies, pies, or doughnuts    ? Soft drinks (soda), sports drinks, or sweetened tea    ? Canned or dry mixes for cakes, soups, sauces, or gravies    Other healthy heart guidelines:   · Do not smoke  Nicotine and other chemicals in cigarettes and cigars can cause lung and heart damage  Ask your healthcare provider for information if you currently smoke and need help to quit  E-cigarettes or smokeless tobacco still contain nicotine  Talk to your healthcare provider before you use these products  · Limit or do not drink alcohol as directed  Alcohol can damage your heart and raise your blood pressure  Your healthcare provider may give you specific daily and weekly limits  The general recommended limit is 1 drink a day for women 21 or older and for men 72 or older  Do not have more than 3 drinks in a day or 7 in a week  The recommended limit is 2 drinks a day for men 24to 59years of age  Do not have more than 4 drinks in a day or 14 in a week  A drink of alcohol is 12 ounces of beer, 5 ounces of wine, or 1½ ounces of liquor  · Exercise regularly  Exercise can help you maintain a healthy weight and improve your blood pressure and cholesterol levels  Regular exercise can also decrease your risk for heart problems  Ask your healthcare provider about the best exercise plan for you  Do not start an exercise program without asking your healthcare provider  Follow up with your doctor or cardiologist as directed:  Write down your questions so you remember to ask them during your visits  © Copyright [address] Information is for End User's use only and may not be sold, redistributed or otherwise used for commercial purposes  All illustrations and images included in CareNotes® are the copyrighted property of [employer]  or [address] [place] [address]  The above information is an  only  It is not intended as medical advice for individual conditions or treatments  Talk to your doctor, nurse or pharmacist before following any medical regimen to see if it is safe and effective for you

## 2021-02-23 NOTE — PROGRESS NOTES
Assessment/Plan:Patient is following up with Cardiology and did have recent stress test done which was normal  He did see them in December and is now taking Zetia   He is taking Crestor 20 mg and can not tolerate 40 mg  Last lab work did show his LDL is at 56 and triglycerides 160  Vitamin D still low at 52  He is deferring vaccines at this time  /86  Recent colonoscopy did show internal hemorrhoids with no polyps  Other screenings are up to date  Labs reviewed with patient and normal  Patient is deferring to follow up with Pulmonary at this time and is deferring a sleep study  Will repeat fasting labs as well  Continue to follow up with [facility] and is continuing PT  Will give Pneumonia 23 today and he will consider COVID vaccine in future  No problem-specific Assessment & Plan notes found for this encounter           Problem List Items Addressed This Visit        Cardiovascular and Mediastinum    Atherosclerotic heart disease of native coronary artery without angina pectoris - Primary    Relevant Orders    Comprehensive metabolic panel    CBC and differential    TSH, 3rd generation with Free T4 reflex    Benign essential hypertension    Relevant Orders    Comprehensive metabolic panel    CBC and differential    TSH, 3rd generation with Free T4 reflex       Musculoskeletal and Integument    Lumbosacral spondylosis without myelopathy    Relevant Orders    Comprehensive metabolic panel    CBC and differential    TSH, 3rd generation with Free T4 reflex       Other    Hyperlipidemia    Relevant Medications    ezetimibe (ZETIA) 10 mg tablet    rosuvastatin (CRESTOR) 20 MG tablet    Other Relevant Orders    Comprehensive metabolic panel    CBC and differential    TSH, 3rd generation with Free T4 reflex    Lipid panel    Vitamin D deficiency    Relevant Orders    Comprehensive metabolic panel    CBC and differential    TSH, 3rd generation with Free T4 reflex      Other Visit Diagnoses     Impaired fasting blood sugar Relevant Orders    Comprehensive metabolic panel    CBC and differential    TSH, 3rd generation with Free T4 reflex    HEMOGLOBIN A1C W/ EAG ESTIMATION    Morbid obesity with BMI of 40 0-44 9, adult ([facility] [address] )        Need for pneumococcal vaccination        Relevant Orders    PNEUMOCOCCAL POLYSACCHARIDE VACCINE 23-VALENT =>3YO SQ IM (Completed)            Subjective:      Patient ID: Kae Murphy is a 76 y o  male  Thersa Other is for a follow up visit  He is doing well and is starting to feel better since having COVID  He states he is continuing PT for his arm and feels like it is coming along slow  He does PT twice a week  He is having some hearing loss and feels like his ears are ringing at times  He has seen ENT in the past for this  He denies any chest pain, SOB, or palpitations  He denies any depression or anxiety  He does go back to [facility] in march  He would like the second pneumonia vaccine  He states otherwise he is doing well  The following portions of the patient's history were reviewed and updated as appropriate: He  has a past medical history of Cardiac disease, Hypertension, and Myocardial infarction ([facility] )    He   Patient Active Problem List    Diagnosis Date Noted    COVID-19 12/16/2020    Impingement syndrome of right shoulder 11/06/2020    BMI 38 0-38 9,adult 03/19/2020    Bronchitis 03/19/2020    Right wrist pain 09/20/2019    Colon cancer screening 03/07/2019    Acquired scoliosis 03/05/2019    Carotid artery occlusion 03/05/2019    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 03/05/2019    Lumbosacral spondylosis without myelopathy 03/05/2019    Old intraocular magnetic foreign body 03/05/2019    Overweight 09/04/2018    Pure hypercholesterolemia 09/04/2018    S/P coronary artery stent placement 09/04/2018    Joint inflammation 05/30/2017    Localized primary osteoarthritis of wrist 02/01/2017    Vitamin D deficiency 03/06/2015    Anxiety 05/21/2013    Atherosclerotic heart disease of native coronary artery without angina pectoris 11/29/2012    Hyperlipidemia 11/29/2012    Coronary atherosclerosis 11/17/2010    Benign essential hypertension 11/17/2010    Osteoarthritis of hip 33/06/0312    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[facility] [address] ) 11/11/2008     He  has a past surgical history that includes Coronary stent placement; Total hip arthroplasty; Hernia repair; Other surgical history (09/18/2010); Tonsillectomy and adenoidectomy; and pr colonoscopy flx dx w/collj spec when pfrmd (N/A, 3/27/2019)  His family history includes Atrial fibrillation in his father; Breast cancer in his mother; Coronary artery disease in his mother; Diabetes in his mother; Hyperlipidemia in his mother  He  reports that he has never smoked  He has never used smokeless tobacco  He reports that he does not drink alcohol or use drugs  Current Outpatient Medications   Medication Sig Dispense Refill    aspirin 81 MG tablet Take 81 mg by mouth daily        Cholecalciferol (VITAMIN D3) 1 25 MG (55926 UT) CAPS Take 1 capsule (50,000 Units total) by mouth once a week 12 capsule 3    ezetimibe (ZETIA) 10 mg tablet Take 1 tablet (10 mg total) by mouth daily 90 tablet 3    Garlic Oil 7744 MG CAPS Take 2 capsules by mouth daily      nitroglycerin (NITROSTAT) 0 4 mg SL tablet Place 1 tablet (0 4 mg total) under the tongue every 5 (five) minutes as needed for chest pain 25 tablet 3    rosuvastatin (CRESTOR) 20 MG tablet Take 1 tablet (20 mg total) by mouth daily 90 tablet 3    albuterol (VENTOLIN HFA) 90 mcg/act inhaler Inhale 2 puffs every 6 (six) hours as needed for wheezing (Patient not taking: Reported on 2/23/2021) 1 Inhaler 5    Ascorbic Acid (vitamin C) 1000 MG tablet Take 1 tablet (1,000 mg total) by mouth 2 (two) times a day for 15 days 30 tablet 0    cholecalciferol (VITAMIN D3) 1,000 units tablet Take 2 tablets (2,000 Units total) by mouth daily for 15 days (Patient not taking: Reported on 2/23/2021) 30 tablet 0    methylPREDNISolone 4 MG tablet therapy pack Use as directed on package (Patient not taking: Reported on 2/23/2021) 21 each 0    omeprazole (PriLOSEC) 20 mg delayed release capsule Take 1 capsule (20 mg total) by mouth daily before breakfast (Patient not taking: Reported on 2/23/2021) 90 capsule 3     No current facility-administered medications for this visit  Current Outpatient Medications on File Prior to Visit   Medication Sig    aspirin 81 MG tablet Take 81 mg by mouth daily   Cholecalciferol (VITAMIN D3) 1 25 MG (68298 UT) CAPS Take 1 capsule (50,000 Units total) by mouth once a week    Garlic Oil 5335 MG CAPS Take 2 capsules by mouth daily    nitroglycerin (NITROSTAT) 0 4 mg SL tablet Place 1 tablet (0 4 mg total) under the tongue every 5 (five) minutes as needed for chest pain    [DISCONTINUED] ezetimibe (ZETIA) 10 mg tablet Take 1 tablet (10 mg total) by mouth daily    [DISCONTINUED] rosuvastatin (CRESTOR) 20 MG tablet Take 1 tablet (20 mg total) by mouth daily    albuterol (VENTOLIN HFA) 90 mcg/act inhaler Inhale 2 puffs every 6 (six) hours as needed for wheezing (Patient not taking: Reported on 2/23/2021)    Ascorbic Acid (vitamin C) 1000 MG tablet Take 1 tablet (1,000 mg total) by mouth 2 (two) times a day for 15 days    cholecalciferol (VITAMIN D3) 1,000 units tablet Take 2 tablets (2,000 Units total) by mouth daily for 15 days (Patient not taking: Reported on 2/23/2021)    methylPREDNISolone 4 MG tablet therapy pack Use as directed on package (Patient not taking: Reported on 2/23/2021)    omeprazole (PriLOSEC) 20 mg delayed release capsule Take 1 capsule (20 mg total) by mouth daily before breakfast (Patient not taking: Reported on 2/23/2021)     No current facility-administered medications on file prior to visit        He is allergic to erythromycin base; other; oxycodone-acetaminophen; percocet [oxycodone-acetaminophen]; and penicillins       Review of Systems   HENT: Positive for tinnitus  Eyes: Negative  Respiratory: Negative  Cardiovascular: Negative  Gastrointestinal: Negative  Endocrine: Negative  Genitourinary: Negative  Musculoskeletal: Negative  Skin: Negative  Allergic/Immunologic: Negative  Neurological: Negative  Hematological: Negative  Psychiatric/Behavioral: Negative  Below is the patient's most recent value for Albumin, ALT, AST, BUN, Calcium, Chloride, Cholesterol, CO2, Creatinine, GFR, Glucose, HDL, Hematocrit, Hemoglobin, Hemoglobin A1C, LDL, Magnesium, Phosphorus, Platelets, Potassium, PSA, Sodium, Triglycerides, and WBC  Lab Results   Component Value Date    ALT 31 02/17/2021    AST 36 02/17/2021    BUN 19 02/17/2021    CALCIUM 9 5 02/17/2021     02/17/2021    CHOL 173 03/26/2015    CO2 29 02/17/2021    CREATININE 1 16 02/17/2021    HDL 41 02/17/2021    HCT 49 6 (H) 02/17/2021    HGB 16 8 02/17/2021    MG 2 3 03/19/2018     02/17/2021    K 4 3 02/17/2021    PSA 0 6 03/19/2018     (L) 04/02/2015    TRIG 160 (H) 02/17/2021    WBC 6 70 02/17/2021     Note: for a comprehensive list of the patient's lab results, access the Results Review activity  Objective:      /86 (BP Location: Left arm, Patient Position: Sitting, Cuff Size: Large)   Pulse 84   Temp (!) 96 8 °F (36 °C) (Temporal)   Ht 5' 8" (1 727 m)   Wt 120 kg (264 lb)   SpO2 98%   BMI 40 14 kg/m²          Physical Exam  Vitals signs reviewed  Constitutional:       Appearance: Normal appearance  He is obese  HENT:      Head: Normocephalic and atraumatic  Right Ear: Tympanic membrane, ear canal and external ear normal       Left Ear: Tympanic membrane, ear canal and external ear normal       Nose: Nose normal       Mouth/Throat:      Mouth: Mucous membranes are moist       Pharynx: Oropharynx is clear  Eyes:      Extraocular Movements: Extraocular movements intact  Conjunctiva/sclera: Conjunctivae normal       Pupils: Pupils are equal, round, and reactive to light  Neck:      Musculoskeletal: Normal range of motion and neck supple  Cardiovascular:      Rate and Rhythm: Normal rate and regular rhythm  Pulses: Normal pulses  Heart sounds: Normal heart sounds  Pulmonary:      Effort: Pulmonary effort is normal       Breath sounds: Normal breath sounds  Abdominal:      General: Abdomen is flat  Bowel sounds are normal       Palpations: Abdomen is soft  Musculoskeletal: Normal range of motion  Skin:     General: Skin is warm and dry  Capillary Refill: Capillary refill takes less than 2 seconds  Neurological:      General: No focal deficit present  Mental Status: He is alert and oriented to person, place, and time  Mental status is at baseline  Psychiatric:         Mood and Affect: Mood normal          Behavior: Behavior normal          Thought Content: Thought content normal          Judgment: Judgment normal          BMI Counseling: Body mass index is 40 14 kg/m²  The BMI is above normal  Nutrition recommendations include reducing portion sizes, decreasing overall calorie intake, 3-5 servings of fruits/vegetables daily, reducing fast food intake, consuming healthier snacks, decreasing soda and/or juice intake, moderation in carbohydrate intake, increasing intake of lean protein, reducing intake of saturated fat and trans fat and reducing intake of cholesterol

## 2021-02-25 ENCOUNTER — OFFICE VISIT (OUTPATIENT)
Dept: PHYSICAL THERAPY | Facility: CLINIC | Age: 66
End: 2021-02-25
Payer: MEDICARE

## 2021-02-25 DIAGNOSIS — M75.41 IMPINGEMENT SYNDROME OF RIGHT SHOULDER: Primary | ICD-10-CM

## 2021-02-25 PROCEDURE — 97112 NEUROMUSCULAR REEDUCATION: CPT

## 2021-02-25 PROCEDURE — 97140 MANUAL THERAPY 1/> REGIONS: CPT

## 2021-02-25 PROCEDURE — 97110 THERAPEUTIC EXERCISES: CPT

## 2021-02-25 NOTE — PROGRESS NOTES
1Daily Note     Today's date: 2021  Patient name: Christina Pack  : 1955  MRN: [id number]  Referring provider: Anabela Delacruz*  Dx:   Encounter Diagnosis     ICD-10-CM    1  Impingement syndrome of right shoulder  M75 41                   Subjective: Pt  reports he tried to shovel some snow but had to stop as it started to hurt  Objective: See treatment diary below      Assessment: Tolerated treatment well  Patient exhibited good technique with therapeutic exercises and would benefit from continued PT  Pt  Improving with P/AROM and strength  Pt  continues to be limited at all end ranges  Added sleeper stretch and ER stretch for HEP  Plan: Progress treatment as tolerated            Precautions: RCR 20     8 Weeks:  21+ TB ER/IR, SL ER to PRE, Prone hz abd thumb up, PNF, stabilization  10 weeks:  21 + blackburns, continue to progress PREs         Manuals 2/8 2/11 2/15 2/18 2/22 2/25   Manual stretch R' S' and E' KR KR RK RK RK KR   STM R' Biceps/  Lateral arm           RK     Supine 4 way TB            L2 2/10 ea   Neuro Re-Ed               MRE PNF D1/D2 2/10 2/10      5'   Ball Stabilization 2' 2'  2'  2"  3' 3# 10   Bodyblade 30"x2   ways 30"x2  ways  30"x 2 ways  30"x2 ways  2' 30"x3 ways   C-T retraction and extension sitting      10x ea 10x ea                       Ther Ex                HBB with cane          10x5" TID HEP  10x 5"   Pulleys 30X 30X  20X  30X 30x 30x   Wand: Passive ER 20x  20x ea 10X 20X      Supine wand flexion 10 PB 10x  10X  PB 10X  PB 10x PB 10x   Scapular Retractions Iso @ wall 20X 20x ea   20x     Scap Dep  Iso table  20X 20X   20X      Prone horiz abd palm down  2/10 2/10 2/10 2/10  1# 2/10 1#2/10    Prone scaption  10x 10x 2/10  2/10  1# 2/10 1#2/10                   Shoulder Isometrics Abd @ wall L2 2/10 L2 2/10  20X  20X  L3 25x   TB High Row               TB Mid Row  L3 2/10  L3 2/10  L3 2/10  L3 2/10   L4 2/10 L4  30x   TB Low Row  L3 2/10  L3 2/10  L3 2/10  L3 2/10  L4  2/10 L4  2/10   TB Biceps  L3 2/10  L3 2/10  L3 2/10 L3 2/10 L4 2/10 L4  30x   TB Triceps  L3 2/10  L3 2/10  L3 2/10 L3 2/10 L4 2/10 L4  30x   TB Upright Row               TB Flexion               Sleeper stretch            10"x10   TB SA Pulldown               TB IR  L2  2/10 L2  2/10  L2 2/10  L2 2/10 L4 2/10 L4  2/10   TB ER  L2  2/10 L2 2/10  L2 2/10  L2 2/10 L4 2/10 L4  2/10   TB Clock    NV           SL Flex     1# 2/10 1# 2/10   SL ABD     1# 2/10 1# 2/10   SL ER  2/10  1# 2/10  1# 2/10  1# 2/10  1# 2/10 1# 2/10   Ther Activity               Reaching               Carrying               Lifting               Catching               Throwing                               Modalities               CP  10' 10' 10'  10' 10' 10'

## 2021-03-01 ENCOUNTER — OFFICE VISIT (OUTPATIENT)
Dept: PHYSICAL THERAPY | Facility: CLINIC | Age: 66
End: 2021-03-01
Payer: MEDICARE

## 2021-03-01 DIAGNOSIS — M75.41 IMPINGEMENT SYNDROME OF RIGHT SHOULDER: Primary | ICD-10-CM

## 2021-03-01 PROCEDURE — 97110 THERAPEUTIC EXERCISES: CPT

## 2021-03-01 PROCEDURE — 97112 NEUROMUSCULAR REEDUCATION: CPT

## 2021-03-01 PROCEDURE — 97140 MANUAL THERAPY 1/> REGIONS: CPT

## 2021-03-01 NOTE — PROGRESS NOTES
Daily Note     Today's date: 3/1/2021  Patient name: Storm Dozier  : 1955  MRN: [id number]  Referring provider: Adonis Bingham*  Dx:   Encounter Diagnosis     ICD-10-CM    1  Impingement syndrome of right shoulder  M75 41                   Subjective: Pt  reports his shoulder is sore today  Pt  to see Dr Rasheeda Newman Tuesday  Pt  reports he did grab his sterring wheel this weekend and he has more soreness in his muscle  Objective: See treatment diary below      Assessment: Tolerated treatment well  Patient exhibited good technique with therapeutic exercises and would benefit from continued PT  Pt  continues to be limited at end ranges with ER/IR being the >st       Plan: Progress treatment as tolerated            Precautions: RCR 20     8 Weeks:  21+ TB ER/IR, SL ER to PRE, Prone hz abd thumb up, PNF, stabilization  10 weeks:  21 + blackburns, continue to progress PREs         Manuals 3/1 2/11 2/15 2/18 2/22 2/25   Manual stretch R' S' and E' KR KR RK RK RK KR   STM R' Biceps/  Lateral arm           RK     Supine 4 way TB  L2 /10          L2 10 ea   Neuro Re-Ed               MRE PNF D1/D2 2/10 2/10      5'   Ball Stabilization 3# 210 2'  2'  2"  3' 3# 2/10   Bodyblade 30"x3   ways 30"x2  ways  30"x 2 ways  30"x2 ways  2' 30"x3 ways   C-T retraction and extension sitting      10x ea 10x ea                       Ther Ex                HBB with cane          10x5" TID HEP  10x 5"   Pulleys 30X 30X  20X  30X 30x 30x   Wand: Passive ER  20x ea 10X 20X      Supine wand flexion PB 2/10 PB 10x  10X  PB 10X  PB 10x PB 10x   Scapular Retractions Iso @ wall  20x ea   20x     Scap Dep  Iso table   20X   20X      Prone horiz abd palm down 1#2/10 2/10 2/10 2/10  1# 2/10 1#2/10    Prone scaption 1# 2/10 10x 2/10  2/10  1# 2/10 1#2/10                   Shoulder Isometrics Abd @ wall L3 2/10 L2 2/10  20X  20X  L3 25x   TB High Row               TB Mid Row  L4 2/10  L3 2/10  L3 2/10  L3 2/10   L4 2/10 L4  30x   TB Low Row  L4 2/10  L3 2/10  L3 2/10  L3 2/10  L4  2/10 L4  2/10   TB Biceps  L4 2/10  L3 2/10  L3 2/10 L3 2/10 L4 2/10 L4  30x   TB Triceps  L4 2/10  L3 2/10  L3 2/10 L3 2/10 L4 2/10 L4  30x   TB Upright Row               TB Flexion               Sleeper stretch            10"x10   TB SA Pulldown               TB IR  L4  2/10 L2  2/10  L2 2/10  L2 2/10 L4 2/10 L4  2/10   TB ER  L4  2/10 L2 2/10  L2 2/10  L2 2/10 L4 2/10 L4  2/10   TB Clock    NV           SL Flex 1#25    1# 2/10 1# 2/10   SL ABD/horizontal 1#25 ea    1# 2/10 1# 2/10   SL ER 1# 25  1# 2/10  1# 2/10  1# 2/10  1# 2/10 1# 2/10   Ther Activity               Reaching               Carrying               Lifting               Catching               Throwing                               Modalities               CP  10' 10' 10'  10' 10' 10'

## 2021-03-04 ENCOUNTER — OFFICE VISIT (OUTPATIENT)
Dept: PHYSICAL THERAPY | Facility: CLINIC | Age: 66
End: 2021-03-04
Payer: MEDICARE

## 2021-03-04 DIAGNOSIS — M75.41 IMPINGEMENT SYNDROME OF RIGHT SHOULDER: Primary | ICD-10-CM

## 2021-03-04 PROCEDURE — 97110 THERAPEUTIC EXERCISES: CPT

## 2021-03-04 PROCEDURE — 97140 MANUAL THERAPY 1/> REGIONS: CPT

## 2021-03-04 PROCEDURE — 97112 NEUROMUSCULAR REEDUCATION: CPT

## 2021-03-04 NOTE — PROGRESS NOTES
Daily Note     Today's date: 3/4/2021  Patient name: Michael Farah  : 1955  MRN: [id number]  Referring provider: Alexis Orosco*  Dx:   Encounter Diagnosis     ICD-10-CM    1  Impingement syndrome of right shoulder  M75 41                   Subjective: Patient still experiences discomfort in R shoulder  He still has trouble with behind back motion and ER  Objective: See treatment diary below  Progressed PNF and incorporated ER/IR stretching today  Assessment: Tolerated treatment well  Patient demonstrated fatigue post treatment, exhibited good technique with therapeutic exercises and would benefit from continued PTto increase R shoulder strength and ROM  Patient obtained new range with Pec release with abduction and CR with ER/IR today  Plan: Continue per plan of care           Precautions: RCR 20     8 Weeks:  21+ TB ER/IR, SL ER to PRE, Prone hz abd thumb up, PNF, stabilization  10 weeks:  21 + blackburns, continue to progress PREs         Manuals 3/ 3-4       Manual stretch R' S' and E' KR        STM R' Biceps/  Lateral arm            Supine 4 way TB  L2 2/10         CR technique   15'                Neuro Re-Ed           MRE PNF D1/D2 2/10 dc       Ball Stabilization 3# 2/10 3# 2x10       PNF D1  L3 2x10       PNF D2  L1 2x10       Bodyblade 30"x3   ways 30"x3 ways       C-T retraction and extension sitting                     Ther Ex          UBE  5'        Pulleys 30X 30x        Supine wand flexion PB 2/10         Prone horiz abd palm down 1#2/10 1# 2x10        Prone scaption 1# 2/10 1# 2x10       TB Mid Row  L4 2/10 L4 2x10       TB Low Row  L4 2/10 L4 2x10       TB Biceps  L4 2/10 L4 2x10       TB Triceps  L4 2/10 L4 2x10       ER stretch at wall   15"x5       TB Flexion          Sleeper stretch          TB IR  L4  2/10 L4 2x10       TB ER  L4  2/10 L4 2x10       IR strap    10"x5       SL Flex 1#25 1# 3x10       SL ABD/horizontal 1#25 ea 1# 3x10       SL ER 1# 25 1# 3x10       Ther Activity          Reaching          Carrying          Lifting          Catching          Throwing                      Modalities           CP  10' 10'

## 2021-03-08 ENCOUNTER — OFFICE VISIT (OUTPATIENT)
Dept: PHYSICAL THERAPY | Facility: CLINIC | Age: 66
End: 2021-03-08
Payer: MEDICARE

## 2021-03-08 DIAGNOSIS — M75.41 IMPINGEMENT SYNDROME OF RIGHT SHOULDER: Primary | ICD-10-CM

## 2021-03-08 PROCEDURE — 97140 MANUAL THERAPY 1/> REGIONS: CPT

## 2021-03-08 PROCEDURE — 97110 THERAPEUTIC EXERCISES: CPT

## 2021-03-08 PROCEDURE — 97112 NEUROMUSCULAR REEDUCATION: CPT

## 2021-03-08 NOTE — PROGRESS NOTES
Daily Note     Today's date: 3/8/2021  Patient name: Zunilda Espino  : 1955  MRN: [id number]  Referring provider: Deja Michaud*  Dx:   Encounter Diagnosis     ICD-10-CM    1  Impingement syndrome of right shoulder  M75 41        Start Time: 0800  Stop Time: 913  Total time in clinic (min): 73 minutes    Subjective: Pt reports he continues to struggle with available ROM with IR, having difficulty reaching behind his back with soreness in anterior R shldr       Objective: See treatment diary below  UBE was in use by another pt  Perform NV if available  Assessment: Tolerated treatment well  Pt most limited with available ROM into ER today  Slight pain present at end range initially, but did improve with additional passive movement through this plane  ROM seemed to improve slightly with CR technique  Patient would benefit from continued PT to further improve strength, improve available ROM, and       Plan: Continue per plan of care           Precautions: RCR 20     8 Weeks:  21+ TB ER/IR, SL ER to PRE, Prone hz abd thumb up, PNF, stabilization  10 weeks:  21 + blackburns, continue to progress PREs         Manuals 3/ 3-4 3/8      Manual stretch R' S' and E' KR        STM R' Biceps/  Lateral arm            Supine 4 way TB  L2 2/10         CR technique   15' 15'               Neuro Re-Ed           MRE PNF D1/D2 2/10 dc       Ball Stabilization 3# 2/10 3# 2x10 3# 2x10      PNF D1  L3 2x10 L3 2x10      PNF D2  L1 2x10 L1 2x10      Bodyblade 30"x3   ways 30"x3 ways 30"x3 ways      C-T retraction and extension sitting                     Ther Ex          UBE  5'  NV      Pulleys 30X 30x  30x      Supine wand flexion PB 2/10         Prone horiz abd palm down 1#2/10 1# 2x10 1# 2x10       Prone scaption 1# 2/10 1# 2x10 1# 2x10      TB Mid Row  L4 2/10 L4 2x10 L4 2x10      TB Low Row  L4 2/10 L4 2x10 L4 2x10      TB Biceps  L4 2/10 L4 2x10 L4 2x10      TB Triceps  L4 2/10 L4 2x10 L4 2x10      ER stretch at wall   15"x5 15"x5      TB Flexion          Sleeper stretch          TB IR  L4  2/10 L4 2x10 L4 2x10      TB ER  L4  2/10 L4 2x10 L4 2x10      IR strap    10"x5 10"x5      SL Flex 1#25 1# 3x10 1# 3x10      SL ABD/horizontal 1#25 ea 1# 3x10 1# 3x10      SL ER 1# 25 1# 3x10 1# 3x10      Ther Activity          Reaching          Carrying          Lifting          Catching          Throwing                      Modalities           CP  10' 10' 10'

## 2021-03-11 ENCOUNTER — APPOINTMENT (OUTPATIENT)
Dept: PHYSICAL THERAPY | Facility: CLINIC | Age: 66
End: 2021-03-11
Payer: MEDICARE

## 2021-03-11 NOTE — PROGRESS NOTES
PT Re-Evaluation     Today's date: 3/15/2021  Patient name: Kae Murphy  : 1955  MRN: [id number]  Referring provider: Kiersten Boles*  Dx:   Encounter Diagnosis     ICD-10-CM    1  Impingement syndrome of right shoulder  M75 41                   Assessment  Assessment details:   CURRENT FUNCTIONAL STATUS    Dressing tolerance: Independent  Bathing/washing hair tolerance: Fair with right arm  Able to reach above shoulder level with right arm  Able to lift several lbs with right arm  Work Status: Off    SHORT TERM GOALS (2 WEEKS)    Increase shoulder AROM and PROM by 10-20 degrees  Decrease pain to 0-4/10  Dressing tolerance: Independent  Bathing/washing hair tolerance: Fair/Good with right arm  Able to reach overhead with right arm  Able to lift 5 lbs with right arm  Work Status: Off    LONG TERM GOALS (DISCHARGE)    Shoulder AROM: F=150 deg, ABD=160 deg, ER=45 deg, IR BTB=T-L JCT  - progressing  Shoulder PROM: F=155 deg, ABD=165 deg, ER=50 deg, IR=60 deg  - progressing  Shoulder Strength: F=57 lbs, ABD=53 lbs, ER=25 lbs, IR=43 lbs  - progressing   Decrease pain to 0-2/10  - progressing   Bathing/washing hair tolerance: Good with right arm  - progressing    Able to reach overhead with the right arm  - progressing   Able to lift 10 lbs above shoulder level with the right arm  - progressing  Work Status: Full Duty - not met    Understanding of Dx/Px/POC: good   Prognosis: good    Goals  See assessment details above  Plan  Plan details: The patient has shown improvement in PT demonstrating decreased pain, increased range of motion, increased strength, and increased tolerance to activity  The patient continues to present with pain, decreased ROM, decreased strength, and decreased tolerance to activity  The patient would benefit from continued skilled PT services to address these issues and to maximize function   The patient will also continue performing their HEP         Patient would benefit from: skilled physical therapy  Planned modality interventions: unattended electrical stimulation, thermotherapy: hydrocollator packs and cryotherapy  Planned therapy interventions: manual therapy, neuromuscular re-education, therapeutic activities and therapeutic exercise  Frequency: 2x week  Duration in weeks: 4        Subjective Evaluation    History of Present Illness  Mechanism of injury: Subjective The patient's right shoulder pain is now intermittent  Sharp twinges of pain in the shoulder with radiation into the arm continue to occasionlly with movement and at rest  He can reach above shoulder level, but not overhead  He can lift several pounds with the right arm  Pain  Current pain ratin  At best pain ratin  At worst pain ratin    Hand dominance: right    Patient Goals  Patient goals for therapy: decreased pain, increased motion, increased strength and return to work          Objective     General Comments:      Shoulder Comments   CURRENT OBJECTIVE MEASUREMENTS    Shoulder AROM: F=115 deg, ABD=120 deg, ER=30 deg, IR BTB=Sacrum  Shoulder PROM: F=125 deg, ABD=130 deg, ER=35 deg, IR=50 deg    Shoulder Strength: F=57 lbs, ABD=49 lbs, ER=19 lbs, IR=47 lbs                  Precautions: RCR 20     8 Weeks:  21+ TB ER/IR, SL ER to PRE, Prone hz abd thumb up, PNF, stabilization  10 weeks:  21 + blackburns, continue to progress PREs         Manuals 3/1 3-4 3/8 3/12 315     Manual stretch R' S' and E' KR     JK  RK     STM R' Biceps/  Lateral arm                Supine 4 way TB  L2 2/10     L2 2x10  L2 2/10     CR technique    15' 15' JK                       Neuro Re-Ed               MRE PNF D1/D2 2/10 dc           Ball Stabilization 3# 2/10 3# 2x10 3# 2x10 3# 2x10  3# 2/10     PNF D1   L3 2x10 L3 2x10 L3 2x10  L3 2/10     PNF D2   L1 2x10 L1 2x10 L1 2x10  L1 2/10     Bodyblade 30"x3   ways 30"x3 ways 30"x3 ways 30"x3 ways  30"x3 ways     C-T retraction and extension sitting                               Ther Ex               UBE   5'  NV         Pulleys 30X 30x  30x 30x  30x     Supine wand flexion PB 2/10              Prone horiz abd palm down 1#2/10 1# 2x10 1# 2x10 21# 2x10  2# 2/10      Prone scaption 1# 2/10 1# 2x10 1# 2x10 2# 2x10  2# 2/10     TB Mid Row  L4 2/10 L4 2x10 L4 2x10 L4 2x10  L4 2/10     TB Low Row  L4 2/10 L4 2x10 L4 2x10 L4 2x10  L4 2/10     TB Biceps  L4 2/10 L4 2x10 L4 2x10 L4 2x10  L4 2/10     TB Triceps  L4 2/10 L4 2x10 L4 2x10 L4 2x10  L4 2/10     ER stretch at wall   15"x5 15"x5         TB Flexion               Sleeper stretch               TB IR  L4  2/10 L4 2x10 L4 2x10 L4 2x10  L4 2/10     TB ER  L4  2/10 L4 2x10 L4 2x10 L4 2x10  L4 2/10     IR strap    10"x5 10"x5 10"x5  10"x5     SL Flex 1#25 1# 3x10 1# 3x10 1# 3x10  2# 3/10     SL ABD/horizontal 1#25 ea 1# 3x10 1# 3x10 1#3x10  2# 3/10     SL ER 1# 25 1# 3x10 1# 3x10 1# 3x10  2# 3/10     Ther Activity               Reaching               Carrying               Lifting               Catching               Throwing                               Modalities               CP  10' 10' 10' 10'

## 2021-03-12 ENCOUNTER — OFFICE VISIT (OUTPATIENT)
Dept: PHYSICAL THERAPY | Facility: CLINIC | Age: 66
End: 2021-03-12
Payer: MEDICARE

## 2021-03-12 DIAGNOSIS — M75.41 IMPINGEMENT SYNDROME OF RIGHT SHOULDER: Primary | ICD-10-CM

## 2021-03-12 PROCEDURE — 97110 THERAPEUTIC EXERCISES: CPT

## 2021-03-12 PROCEDURE — 97140 MANUAL THERAPY 1/> REGIONS: CPT

## 2021-03-12 NOTE — PROGRESS NOTES
Daily Note     Today's date: 3/12/2021  Patient name: Juan Esteves  : 1955  MRN: [id number]  Referring provider: Darion Noonan*  Dx:   Encounter Diagnosis     ICD-10-CM    1  Impingement syndrome of right shoulder  M75 41                   Subjective: Patient reports difficulty with IR motions, and forward flexion when reaching above head  Pain also noted with IR motions  Occasional       Objective: See treatment diary below  Assessment: Tightness present all planes during PROM, empty end feel noted, pain limiting  Consider addition of half wall lifts, oh ben, and cone stacking above shoulder height to improve active shoulder flexion  Good strength noted during CR technique  Patient would benefit from continued PT to improve ROM passively and actively to aid in overall function  Plan: Continue per plan of care           Precautions: RCR 20     8 Weeks:  21+ TB ER/IR, SL ER to PRE, Prone hz abd thumb up, PNF, stabilization  10 weeks:  21 + blackburns, continue to progress PREs         Manuals 3/1 3-4 3/8 3/12     Manual stretch R' S' and E' KR   JK     STM R' Biceps/  Lateral arm            Supine 4 way TB  L2 2/10    L2 2x10     CR technique   15' 15' JK              Neuro Re-Ed           MRE PNF D1/D2 2/10 dc       Ball Stabilization 3# 2/10 3# 2x10 3# 2x10 3# 2x10     PNF D1  L3 2x10 L3 2x10 L3 2x10     PNF D2  L1 2x10 L1 2x10 L1 2x10     Bodyblade 30"x3   ways 30"x3 ways 30"x3 ways 30"x3 ways     C-T retraction and extension sitting                     Ther Ex          UBE  5'  NV      Pulleys 30X 30x  30x 30x     Supine wand flexion PB 2/10         Prone horiz abd palm down 1#2/10 1# 2x10 1# 2x10 1# 2x10      Prone scaption 1# 2/10 1# 2x10 1# 2x10 1# 2x10     TB Mid Row  L4 2/10 L4 2x10 L4 2x10 L4 2x10     TB Low Row  L4 2/10 L4 2x10 L4 2x10 L4 2x10     TB Biceps  L4 2/10 L4 2x10 L4 2x10 L4 2x10     TB Triceps  L4 2/10 L4 2x10 L4 2x10 L4 2x10     ER stretch at wall   15"x5 15"x5      TB Flexion          Sleeper stretch          TB IR  L4  2/10 L4 2x10 L4 2x10 L4 2x10     TB ER  L4  2/10 L4 2x10 L4 2x10 L4 2x10     IR strap    10"x5 10"x5 10"x5     SL Flex 1#25 1# 3x10 1# 3x10 1# 3x10     SL ABD/horizontal 1#25 ea 1# 3x10 1# 3x10 1#3x10     SL ER 1# 25 1# 3x10 1# 3x10 1# 3x10     Ther Activity          Reaching          Carrying          Lifting          Catching          Throwing                      Modalities           CP  10' 10' 10' 10'

## 2021-03-15 ENCOUNTER — EVALUATION (OUTPATIENT)
Dept: PHYSICAL THERAPY | Facility: CLINIC | Age: 66
End: 2021-03-15
Payer: MEDICARE

## 2021-03-15 DIAGNOSIS — M75.41 IMPINGEMENT SYNDROME OF RIGHT SHOULDER: Primary | ICD-10-CM

## 2021-03-15 PROCEDURE — 97110 THERAPEUTIC EXERCISES: CPT | Performed by: PHYSICAL THERAPIST

## 2021-03-15 PROCEDURE — 97112 NEUROMUSCULAR REEDUCATION: CPT | Performed by: PHYSICAL THERAPIST

## 2021-03-15 NOTE — LETTER
March 15, 2021    Sergey Estrada MD  [address] [address]    Patient: Rosaura Dodson   YOB: 1955   Date of Visit: 3/15/2021     Encounter Diagnosis     ICD-10-CM    1  Impingement syndrome of right shoulder  M75 41        Dear Dr Keesha Brooks:    Thank you for your recent referral of Rosaura Dodson  Please review the attached evaluation summary from Javy's recent visit  Please verify that you agree with the plan of care by signing the attached order  If you have any questions or concerns, please do not hesitate to call  I sincerely appreciate the opportunity to share in the care of one of your patients and hope to have another opportunity to work with you in the near future  Sincerely,    Edinson Rodriguez, PT      Referring Provider:      I certify that I have read the below Plan of Care and certify the need for these services furnished under this plan of treatment while under my care  Sergey Estrada MD  [address] [address]  [place] [place]          PT Re-Evaluation     Today's date: 3/15/2021  Patient name: Rosaura Dodson  : 1955  MRN: [id number]  Referring provider: Sarah Quick*  Dx:   Encounter Diagnosis     ICD-10-CM    1  Impingement syndrome of right shoulder  M75 41                   Assessment  Assessment details:   CURRENT FUNCTIONAL STATUS    Dressing tolerance: Independent  Bathing/washing hair tolerance: Fair with right arm  Able to reach above shoulder level with right arm  Able to lift several lbs with right arm  Work Status: Off    SHORT TERM GOALS (2 WEEKS)    Increase shoulder AROM and PROM by 10-20 degrees  Decrease pain to 0-4/10  Dressing tolerance: Independent  Bathing/washing hair tolerance: Fair/Good with right arm  Able to reach overhead with right arm  Able to lift 5 lbs with right arm      Work Status: Off    LONG TERM GOALS (DISCHARGE)    Shoulder AROM: F=150 deg, ABD=160 deg, ER=45 deg, IR BTB=T-L JCT  - progressing  Shoulder PROM: F=155 deg, ABD=165 deg, ER=50 deg, IR=60 deg  - progressing  Shoulder Strength: F=57 lbs, ABD=53 lbs, ER=25 lbs, IR=43 lbs  - progressing   Decrease pain to 0-2/10  - progressing   Bathing/washing hair tolerance: Good with right arm  - progressing    Able to reach overhead with the right arm  - progressing   Able to lift 10 lbs above shoulder level with the right arm  - progressing  Work Status: Full Duty - not met    Understanding of Dx/Px/POC: good   Prognosis: good    Goals  See assessment details above  Plan  Plan details: The patient has shown improvement in PT demonstrating decreased pain, increased range of motion, increased strength, and increased tolerance to activity  The patient continues to present with pain, decreased ROM, decreased strength, and decreased tolerance to activity  The patient would benefit from continued skilled PT services to address these issues and to maximize function  The patient will also continue performing their HEP  Patient would benefit from: skilled physical therapy  Planned modality interventions: unattended electrical stimulation, thermotherapy: hydrocollator packs and cryotherapy  Planned therapy interventions: manual therapy, neuromuscular re-education, therapeutic activities and therapeutic exercise  Frequency: 2x week  Duration in weeks: 4        Subjective Evaluation    History of Present Illness  Mechanism of injury: Subjective The patient's right shoulder pain is now intermittent  Sharp twinges of pain in the shoulder with radiation into the arm continue to occasionlly with movement and at rest  He can reach above shoulder level, but not overhead  He can lift several pounds with the right arm    Pain  Current pain ratin  At best pain ratin  At worst pain ratin    Hand dominance: right    Patient Goals  Patient goals for therapy: decreased pain, increased motion, increased strength and return to work          Objective     General Comments:      Shoulder Comments   CURRENT OBJECTIVE MEASUREMENTS    Shoulder AROM: F=115 deg, ABD=120 deg, ER=30 deg, IR BTB=Sacrum  Shoulder PROM: F=125 deg, ABD=130 deg, ER=35 deg, IR=50 deg    Shoulder Strength: F=57 lbs, ABD=49 lbs, ER=19 lbs, IR=47 lbs                  Precautions: RCR 11/30/20     8 Weeks:  1/25/21+ TB ER/IR, SL ER to PRE, Prone hz abd thumb up, PNF, stabilization  10 weeks:  2/8/21 + blackburns, continue to progress PREs         Manuals 3/1 3-4 3/8 3/12 3/15     Manual stretch R' S' and E' KR     JK  RK     STM R' Biceps/  Lateral arm                Supine 4 way TB  L2 2/10     L2 2x10  L2 2/10     CR technique    15' 15' JK                       Neuro Re-Ed               MRE PNF D1/D2 2/10 dc           Ball Stabilization 3# 2/10 3# 2x10 3# 2x10 3# 2x10  3# 2/10     PNF D1   L3 2x10 L3 2x10 L3 2x10  L3 2/10     PNF D2   L1 2x10 L1 2x10 L1 2x10  L1 2/10     Bodyblade 30"x3   ways 30"x3 ways 30"x3 ways 30"x3 ways  30"x3 ways     C-T retraction and extension sitting                               Ther Ex               UBE   5'  NV         Pulleys 30X 30x  30x 30x  30x     Supine wand flexion PB 2/10              Prone horiz abd palm down 1#2/10 1# 2x10 1# 2x10 21# 2x10  2# 2/10      Prone scaption 1# 2/10 1# 2x10 1# 2x10 2# 2x10  2# 2/10     TB Mid Row  L4 2/10 L4 2x10 L4 2x10 L4 2x10  L4 2/10     TB Low Row  L4 2/10 L4 2x10 L4 2x10 L4 2x10  L4 2/10     TB Biceps  L4 2/10 L4 2x10 L4 2x10 L4 2x10  L4 2/10     TB Triceps  L4 2/10 L4 2x10 L4 2x10 L4 2x10  L4 2/10     ER stretch at wall   15"x5 15"x5         TB Flexion               Sleeper stretch               TB IR  L4  2/10 L4 2x10 L4 2x10 L4 2x10  L4 2/10     TB ER  L4  2/10 L4 2x10 L4 2x10 L4 2x10  L4 2/10     IR strap    10"x5 10"x5 10"x5  10"x5     SL Flex 1#25 1# 3x10 1# 3x10 1# 3x10  2# 3/10     SL ABD/horizontal 1#25 ea 1# 3x10 1# 3x10 1#3x10  2# 3/10     SL ER 1# 25 1# 3x10 1# 3x10 1# 3x10  2# 3/10     Ther Activity               Reaching               Carrying               Lifting               Catching               Throwing                               Modalities               CP  10' 10' 10' 10'

## 2021-03-18 ENCOUNTER — TELEPHONE (OUTPATIENT)
Dept: INTERNAL MEDICINE CLINIC | Facility: CLINIC | Age: 66
End: 2021-03-18

## 2021-03-18 ENCOUNTER — OFFICE VISIT (OUTPATIENT)
Dept: PHYSICAL THERAPY | Facility: CLINIC | Age: 66
End: 2021-03-18
Payer: MEDICARE

## 2021-03-18 DIAGNOSIS — M75.41 IMPINGEMENT SYNDROME OF RIGHT SHOULDER: Primary | ICD-10-CM

## 2021-03-18 DIAGNOSIS — M62.838 MUSCLE SPASM: Primary | ICD-10-CM

## 2021-03-18 PROCEDURE — 97110 THERAPEUTIC EXERCISES: CPT

## 2021-03-18 PROCEDURE — 97112 NEUROMUSCULAR REEDUCATION: CPT

## 2021-03-18 PROCEDURE — 97140 MANUAL THERAPY 1/> REGIONS: CPT

## 2021-03-18 RX ORDER — CYCLOBENZAPRINE HCL 10 MG
10 TABLET ORAL 3 TIMES DAILY PRN
Qty: 60 TABLET | Refills: 0 | Status: SHIPPED | OUTPATIENT
Start: 2021-03-18 | End: 2022-02-02

## 2021-03-18 NOTE — TELEPHONE ENCOUNTER
Patient wife called to request if a muscle relaxer for patients shoulder due to his surgery can be sent into [facility] in [facility] [place]

## 2021-03-18 NOTE — PROGRESS NOTES
Daily Note     Today's date: 3/18/2021  Patient name: Christina Pack  : 1955  MRN: [id number]  Referring provider: Anabela Delacruz*  Dx:   Encounter Diagnosis     ICD-10-CM    1  Impingement syndrome of right shoulder  M75 41        Start Time: 806          Subjective: Pt reports he has been over using his R shldr with activities around his [place] since last visit  "I irritated it "      Objective: See treatment diary below      Assessment: Tolerated treatment well  Continued with program as outlined below  Most limited today with PROM ER/IR today  Very challenged with resisted ER motions today  Program slightly modified d/t soreness reported at start of session  Patient would benefit from continued PT to further improve strength and ROM, to maximize function with OH activities  Plan: Continue per plan of care  Progress as able, per MD protocol            Precautions: RCR 20     8 Weeks:  21+ TB ER/IR, SL ER to PRE, Prone hz abd thumb up, PNF, stabilization  10 weeks:  21 + blackburns, continue to progress PREs         Manuals 3/1 3-4 3/8 3/12 3/15  3/18   Manual stretch R' S' and E' KR     JK  RK  AFB   STM R' Biceps/  Lateral arm             +R pec  AFB   Supine 4 way TB  L2 2/10     L2 2x10  L2 2/10  nv   CR technique    15' 15' JK                       Neuro Re-Ed               MRE PNF D1/D2 2/10 dc           Ball Stabilization 3# 2/10 3# 2x10 3# 2x10 3# 2x10  3# 2/10  3# 2/10   PNF D1   L3 2x10 L3 2x10 L3 2x10  L3 2/10  L3 2/10   PNF D2   L1 2x10 L1 2x10 L1 2x10  L1 2/10  L1 2/10   Bodyblade 30"x3   ways 30"x3 ways 30"x3 ways 30"x3 ways  30"x3 ways nv   C-T retraction and extension sitting                               Ther Ex               UBE   5'  NV         Pulleys 30X 30x  30x 30x  30x  30x   Supine wand flexion PB 2/10              Prone horiz abd palm down 1#2/10 1# 2x10 1# 2x10 21# 2x10  2# 2/10  2# 2/10    Prone scaption 1# 2/10 1# 2x10 1# 2x10 2# 2x10  2# 2/10  2# 2/10   TB Mid Row  L4 2/10 L4 2x10 L4 2x10 L4 2x10  L4 2/10  L4 2/10   TB Low Row  L4 2/10 L4 2x10 L4 2x10 L4 2x10  L4 2/10  L4 2/10   TB Biceps  L4 2/10 L4 2x10 L4 2x10 L4 2x10  L4 2/10  L4 2/10   TB Triceps  L4 2/10 L4 2x10 L4 2x10 L4 2x10  L4 2/10  L4 2/10   ER stretch at wall   15"x5 15"x5         TB Flexion               Sleeper stretch               TB IR  L4  2/10 L4 2x10 L4 2x10 L4 2x10  L4 2/10 L4 2/10   TB ER  L4  2/10 L4 2x10 L4 2x10 L4 2x10  L4 2/10  L4 2/10   IR strap    10"x5 10"x5 10"x5  10"x5  nv   SL Flex 1#25 1# 3x10 1# 3x10 1# 3x10  2# 3/10   2# 3/10   SL ABD/horizontal 1#25 ea 1# 3x10 1# 3x10 1#3x10  2# 3/10  2# 3/10   SL ER 1# 25 1# 3x10 1# 3x10 1# 3x10  2# 3/10  2# 3/10   Ther Activity               Reaching               Carrying               Lifting               Catching               Throwing                               Modalities               CP  10' 10' 10' 10'

## 2021-03-22 ENCOUNTER — OFFICE VISIT (OUTPATIENT)
Dept: PHYSICAL THERAPY | Facility: CLINIC | Age: 66
End: 2021-03-22
Payer: MEDICARE

## 2021-03-22 DIAGNOSIS — M75.41 IMPINGEMENT SYNDROME OF RIGHT SHOULDER: Primary | ICD-10-CM

## 2021-03-22 PROCEDURE — 97140 MANUAL THERAPY 1/> REGIONS: CPT

## 2021-03-22 PROCEDURE — 97110 THERAPEUTIC EXERCISES: CPT

## 2021-03-22 NOTE — PROGRESS NOTES
Daily Note     Today's date: 3/22/2021  Patient name: Martin Griffith  : 1955  MRN: [id number]  Referring provider: Buzz Goldmann*  Dx:   Encounter Diagnosis     ICD-10-CM    1  Impingement syndrome of right shoulder  M75 41                   Subjective: Patient stated the his shoulder is feeling ok  No new changes since last visit  Objective: See treatment diary below      Assessment: Tolerated treatment well  Improved endurance, as patient is able to complete multiple strengthening tasks without rest  Fatigues quickly with stability tasks  Minimal UT compensation with end range PNF  Limited in all planes during PROM, severely in IR and ER  Trialed contract relax PNF to increase ROM, with minimal improvement  Patient would benefit from continued PT  Plan: Continue per plan of care          Precautions: RCR 20     8 Weeks:  21+ TB ER/IR, SL ER to PRE, Prone hz abd thumb up, PNF, stabilization  10 weeks:  21 + blackburns, continue to progress PREs         Manuals 3/1 3-4 3/8 3/12 3/15  3/18 3/22   Manual stretch R' S' and E' KR     JK  RK  AFB MB   STM R' Biceps/  Lateral arm             +R pec  AFB MB   Supine 4 way TB  L2 2/10     L2 2x10  L2 2/10  nv    CR technique    15' 15' JK                         Neuro Re-Ed                MRE PNF D1/D2 2/10 dc            Ball Stabilization 3# 2/10 3# 2x10 3# 2x10 3# 2x10  3# 2/10  3# 2/10 3# 2/10   PNF D1   L3 2x10 L3 2x10 L3 2x10  L3 2/10  L3 2/10 L3 2x10   PNF D2   L1 2x10 L1 2x10 L1 2x10  L1 2/10  L1 2/10 L1 2x10   Bodyblade 30"x3   ways 30"x3 ways 30"x3 ways 30"x3 ways  30"x3 ways nv 30" x3 way   C-T retraction and extension sitting                                 Ther Ex                UBE   5'  NV          Pulleys 30X 30x  30x 30x  30x  30x 30x   Supine wand flexion PB 2/10               Prone horiz abd palm down 1#2/10 1# 2x10 1# 2x10 21# 2x10  2# 2/10  2# 2/10 2# 2x10    Prone scaption 1# 2/10 1# 2x10 1# 2x10 2# 2x10  2# 2/10  2# 2/10 2# 2x10   TB Mid Row  L4 2/10 L4 2x10 L4 2x10 L4 2x10  L4 2/10  L4 2/10 L4 2x10   TB Low Row  L4 2/10 L4 2x10 L4 2x10 L4 2x10  L4 2/10  L4 2/10 L4 2x10   TB Biceps  L4 2/10 L4 2x10 L4 2x10 L4 2x10  L4 2/10  L4 2/10  L4 2/10   TB Triceps  L4 2/10 L4 2x10 L4 2x10 L4 2x10  L4 2/10  L4 2/10  L4 2/10   ER stretch at wall   15"x5 15"x5          TB Flexion                Sleeper stretch                TB IR  L4  2/10 L4 2x10 L4 2x10 L4 2x10  L4 2/10 L4 2/10 L4 2x10   TB ER  L4  2/10 L4 2x10 L4 2x10 L4 2x10  L4 2/10  L4 2/10 L4 2x10   IR strap    10"x5 10"x5 10"x5  10"x5  nv 5x10"   SL Flex 1#25 1# 3x10 1# 3x10 1# 3x10  2# 3/10   2# 3/10 2# 2x10   SL ABD/horizontal 1#25 ea 1# 3x10 1# 3x10 1#3x10  2# 3/10  2# 3/10 2# 2x10   SL ER 1# 25 1# 3x10 1# 3x10 1# 3x10  2# 3/10  2# 3/10 2# 2x10   Ther Activity                Reaching                Carrying                Lifting                Catching                Throwing                                 Modalities                CP  10' 10' 10' 10'

## 2021-03-25 ENCOUNTER — OFFICE VISIT (OUTPATIENT)
Dept: PHYSICAL THERAPY | Facility: CLINIC | Age: 66
End: 2021-03-25
Payer: MEDICARE

## 2021-03-25 DIAGNOSIS — M75.41 IMPINGEMENT SYNDROME OF RIGHT SHOULDER: Primary | ICD-10-CM

## 2021-03-25 PROCEDURE — 97140 MANUAL THERAPY 1/> REGIONS: CPT

## 2021-03-25 PROCEDURE — 97112 NEUROMUSCULAR REEDUCATION: CPT

## 2021-03-25 PROCEDURE — 97110 THERAPEUTIC EXERCISES: CPT

## 2021-03-25 NOTE — PROGRESS NOTES
Daily Note     Today's date: 3/25/2021  Patient name: Laya Garcia  : 1955  MRN: [id number]  Referring provider: Merritt Covarrubias*  Dx:   Encounter Diagnosis     ICD-10-CM    1  Impingement syndrome of right shoulder  M75 41        Start Time: 0800  Stop Time: 907  Total time in clinic (min): 67 minutes    Subjective: Patient reports that his shoulder is about the same as last session  Notes that most of his discomfort is down his R arm over the side of his shoulder  Denies any radiating symptoms  Objective: See treatment diary below      Assessment: Tolerated treatment well continuing with current POC  He continues to most limited with abduction and IR ROM  Focused on shoulder mobility and strengthening throughout with improvements made post session  Patient would benefit from continued PT  Plan: Continue per plan of care  Precautions: RCR 20     8 Weeks:  21+ TB ER/IR, SL ER to PRE, Prone hz abd thumb up, PNF, stabilization  10 weeks:  21 + blackburns, continue to progress PREs         Manuals 3/25   3/12 3/15  3/18 3/22   Manual stretch R' S' and E' MM   Carloyn Leal  AFB MB   STM R' Biceps/  Lateral arm   MM        +R pec  AFB MB   Supine 4 way TB    L2 2x10  L2 2/10  nv    CR technique     JK                      Neuro Re-Ed             MRE PNF D1/D2             Ball Stabilization 3# 2x10   3# 2x10  3# 2/10  3# 2/10 3# 2/10   PNF D1 L3 2x10   L3 2x10  L3 2/10  L3 2/10 L3 2x10   PNF D2 L1 2x10   L1 2x10  L1 2/10  L1 2/10 L1 2x10   Bodyblade 30"x3 way   30"x3 ways  30"x3 ways nv 30" x3 way   C-T retraction and extension sitting                           Ther Ex             UBE             Pulleys 30x   30x  30x  30x 30x   Supine wand flexion              Prone horiz abd palm down 2# 2x10   21# 2x10  2# 2/10  2# 2/10 2# 2x10    Prone scaption 2# 2x10   2# 2x10  2# 2/10  2# 2/10 2# 2x10   TB Mid Row L5 2x10   L4 2x10  L4 2/10  L4 2/10 L4 2x10   TB Low Row L5 2x10 L4 2x10  L4 2/10  L4 2/10 L4 2x10   TB Biceps L5 2x10   L4 2x10  L4 2/10  L4 2/10  L4 2/10   TB Triceps L5 2x10   L4 2x10  L4 2/10  L4 2/10  L4 2/10   ER stretch at wall             TB Flexion             Sleeper stretch             TB IR L4 2x10   L4 2x10  L4 2/10 L4 2/10 L4 2x10   TB ER L4 2x10   L4 2x10  L4 2/10  L4 2/10 L4 2x10   IR strap  10"x5   10"x5  10"x5  nv 5x10"   SL Flex 2# 2x10   1# 3x10  2# 3/10   2# 3/10 2# 2x10   SL ABD/horizontal 2# 2x10   1#3x10  2# 3/10  2# 3/10 2# 2x10   SL ER 2# 2x10   1# 3x10  2# 3/10  2# 3/10 2# 2x10   Ther Activity             Reaching             Carrying             Lifting             Catching             Throwing                           Modalities             CP    10'

## 2021-03-29 ENCOUNTER — OFFICE VISIT (OUTPATIENT)
Dept: PHYSICAL THERAPY | Facility: CLINIC | Age: 66
End: 2021-03-29
Payer: MEDICARE

## 2021-03-29 DIAGNOSIS — M75.41 IMPINGEMENT SYNDROME OF RIGHT SHOULDER: Primary | ICD-10-CM

## 2021-03-29 PROCEDURE — 97110 THERAPEUTIC EXERCISES: CPT

## 2021-03-29 PROCEDURE — 97140 MANUAL THERAPY 1/> REGIONS: CPT

## 2021-03-29 NOTE — PROGRESS NOTES
Daily Note     Today's date: 3/29/2021  Patient name: Igor Jefferson  : 1955  MRN: [id number]  Referring provider: Ashley Valentin*  Dx:   Encounter Diagnosis     ICD-10-CM    1  Impingement syndrome of right shoulder  M75 41                   Subjective: Shoulder is still feeling the same  Objective: See treatment diary below      Assessment: Tolerated treatment well  Continued to progress patient with strengthening and improving ROM/mobility  Continues to perform well with strengthening tasks, maybe able to increase resistance/weight next visit  ROM continues to be a miguel to progression  Patient would benefit from continued PT  Plan: Continue per plan of care  Precautions: RCR 20     8 Weeks:  21+ TB ER/IR, SL ER to PRE, Prone hz abd thumb up, PNF, stabilization  10 weeks:  21 + blackburns, continue to progress PREs         Manuals 3/25 3/29  3/12 3/15  3/18 3/22   Manual stretch R' S' and E' MM   Read [place]  AFB MB   STM R' Biceps/  Lateral arm   MM        +R pec  AFB MB   Supine 4 way TB    L2 2x10  L2 2/10  nv    CR technique     JK                      Neuro Re-Ed             MRE PNF D1/D2             Ball Stabilization 3# 2x10 3# 2x10  3# 2x10  3# 2/10  3# 2/10 3# 2/10   PNF D1 L3 2x10 L3 2x10  L3 2x10  L3 2/10  L3 2/10 L3 2x10   PNF D2 L1 2x10 L1 2x10  L1 2x10  L1 2/10  L1 2/10 L1 2x10   Bodyblade 30"x3 way 30"x3 way  30"x3 ways  30"x3 ways nv 30" x3 way   C-T retraction and extension sitting                           Ther Ex             UBE             Pulleys 30x 30x  30x  30x  30x 30x   Supine wand flexion              Prone horiz abd palm down 2# 2x10 2# 2x10  21# 2x10  2# 2/10  2# 2/10 2# 2x10    Prone scaption 2# 2x10 2# 2x10  2# 2x10  2# 2/10  2# 2/10 2# 2x10   TB Mid Row L5 2x10 L5 2x10  L4 2x10  L4 2/10  L4 2/10 L4 2x10   TB Low Row L5 2x10 L5 2x10  L4 2x10  L4 2/10  L4 2/10 L4 2x10   TB Biceps L5 2x10 L5 2x10  L4 2x10  L4 2/10  L4 2/10  L4 2/10   TB Triceps L5 2x10 L5 2x10  L4 2x10  L4 2/10  L4 2/10  L4 2/10   ER stretch at wall             TB Flexion             Sleeper stretch             TB IR L4 2x10 L4 2x10  L4 2x10  L4 2/10 L4 2/10 L4 2x10   TB ER L4 2x10 L4 2x10  L4 2x10  L4 2/10  L4 2/10 L4 2x10   IR strap  10"x5 10"x5  10"x5  10"x5  nv 5x10"   SL Flex 2# 2x10 2# 2x10  1# 3x10  2# 3/10   2# 3/10 2# 2x10   SL ABD/horizontal 2# 2x10 2# 2x10  1#3x10  2# 3/10  2# 3/10 2# 2x10   SL ER 2# 2x10 2# 2x10  1# 3x10  2# 3/10  2# 3/10 2# 2x10   Ther Activity             Reaching             Carrying             Lifting             Catching             Throwing                           Modalities             CP  10  10'

## 2021-03-31 ENCOUNTER — OFFICE VISIT (OUTPATIENT)
Dept: PHYSICAL THERAPY | Facility: CLINIC | Age: 66
End: 2021-03-31
Payer: MEDICARE

## 2021-03-31 DIAGNOSIS — M75.41 IMPINGEMENT SYNDROME OF RIGHT SHOULDER: Primary | ICD-10-CM

## 2021-03-31 PROCEDURE — 97110 THERAPEUTIC EXERCISES: CPT

## 2021-03-31 PROCEDURE — 97140 MANUAL THERAPY 1/> REGIONS: CPT

## 2021-03-31 PROCEDURE — 97112 NEUROMUSCULAR REEDUCATION: CPT

## 2021-03-31 NOTE — PROGRESS NOTES
Daily Note     Today's date: 3/31/2021  Patient name: Jelani Brooks  : 1955  MRN: [id number]  Referring provider: Willie Adams*  Dx:   Encounter Diagnosis     ICD-10-CM    1  Impingement syndrome of right shoulder  M75 41                   Subjective: patient stated that he irritated his shoulder when he was under a table  His shoulder is feeling better now  Objective: See treatment diary below      Assessment: Tolerated treatment well  Patient is able to progress some strengthening tasks today  External rotators remain weak, and patient tends to compensate with torso rotations or abd  to increase arc  Shoulder stability continues to be challenging  PROM remains limited  Patient would benefit from continued PT  Plan: Continue per plan of care  Precautions: RCR 20     8 Weeks:  21+ TB ER/IR, SL ER to PRE, Prone hz abd thumb up, PNF, stabilization  10 weeks:  21 + blackburns, continue to progress PREs         Manuals 3/25 3/29 3/31  3/15  3/18 3/22   Manual stretch R' S' and E' MM  MB   RK  AFB MB   STM R' Biceps/  Lateral arm   MM  MB     +R pec  AFB MB   Supine 4 way TB      L2 2/10  nv    CR technique                          Neuro Re-Ed            MRE PNF D1/D2            Ball Stabilization 3# 2x10 3# 2x10 3# 2x10   3# 2/10  3# 2/10 3# 2/10   PNF D1 L3 2x10 L3 2x10 L2 2x10   L3 2/10  L3 2/10 L3 2x10   PNF D2 L1 2x10 L1 2x10 L2 2x10   L1 2/10  L1 2/10 L1 2x10   Bodyblade 30"x3 way 30"x3 way 30"x3 way   30"x3 ways nv 30" x3 way   C-T retraction and extension sitting                         Ther Ex            UBE            Pulleys 30x 30x 30x   30x  30x 30x   Supine wand flexion             Prone horiz abd palm down 2# 2x10 2# 2x10 2# 2x10   2# 2/10  2# 2/10 2# 2x10    Prone scaption 2# 2x10 2# 2x10 2# 2x10   2# 2/10  2# 2/10 2# 2x10   TB Mid Row L5 2x10 L5 2x10 2x10 p4   L4 2/10  L4 2/10 L4 2x10   TB Low Row L5 2x10 L5 2x10 2x10 p4   L4 2/10  L4 2/10 L4 2x10   TB Biceps L5 2x10 L5 2x10 2x10 p4   L4 2/10  L4 2/10  L4 2/10   TB Triceps L5 2x10 L5 2x10 2x10 p4   L4 2/10  L4 2/10  L4 2/10   ER stretch at wall            TB Flexion            Sleeper stretch            TB IR L4 2x10 L4 2x10 2x10 p1   L4 2/10 L4 2/10 L4 2x10   TB ER L4 2x10 L4 2x10 L 4 2x10   L4 2/10  L4 2/10 L4 2x10   IR strap  10"x5 10"x5 10' x5    10"x5  nv 5x10"   SL Flex 2# 2x10 2# 2x10 2# 2x10   2# 3/10   2# 3/10 2# 2x10   SL ABD/horizontal 2# 2x10 2# 2x10 2# 2x10   2# 3/10  2# 3/10 2# 2x10   SL ER 2# 2x10 2# 2x10 2# 2x10   2# 3/10  2# 3/10 2# 2x10   Ther Activity            Reaching            Carrying            Lifting            Catching            Throwing                         Modalities            CP  10 def

## 2021-04-01 ENCOUNTER — APPOINTMENT (OUTPATIENT)
Dept: PHYSICAL THERAPY | Facility: CLINIC | Age: 66
End: 2021-04-01
Payer: MEDICARE

## 2021-04-05 ENCOUNTER — OFFICE VISIT (OUTPATIENT)
Dept: PHYSICAL THERAPY | Facility: CLINIC | Age: 66
End: 2021-04-05
Payer: MEDICARE

## 2021-04-05 DIAGNOSIS — M75.41 IMPINGEMENT SYNDROME OF RIGHT SHOULDER: Primary | ICD-10-CM

## 2021-04-05 PROCEDURE — 97140 MANUAL THERAPY 1/> REGIONS: CPT | Performed by: PHYSICAL THERAPIST

## 2021-04-05 PROCEDURE — 97112 NEUROMUSCULAR REEDUCATION: CPT | Performed by: PHYSICAL THERAPIST

## 2021-04-05 PROCEDURE — 97110 THERAPEUTIC EXERCISES: CPT | Performed by: PHYSICAL THERAPIST

## 2021-04-05 NOTE — PROGRESS NOTES
Daily Note     Today's date: 2021  Patient name: Simin Dinero  : 1955  MRN: [id number]  Referring provider: Anil Mcfadden*  Dx:   Encounter Diagnosis     ICD-10-CM    1  Impingement syndrome of right shoulder  M75 41                   Subjective: Patient notes more stiffness in the shoulder today  Objective: See treatment diary below      Assessment: Patient had more difficulty with prone exercises due to shoulder tightness  Plan: Progress prone resistance to 3 lbs at next session  Precautions: RCR 20     8 Weeks:  21+ TB ER/IR, SL ER to PRE, Prone hz abd thumb up, PNF, stabilization  10 weeks:  21 + blackburns, continue to progress PREs         Manuals 3/25 3/29 3/31 4/5 3/15  3/18 3/22   Manual stretch R' S' and E' MM  MB RK  RK  AFB MB   STM R' Biceps/  Lateral arm   MM  MB RK    +R pec  AFB MB   Supine 4 way TB      L2 2/10  nv                 Neuro Re-Ed            Ball Stabilization 3# 2x10 3# 2x10 3# 2x10 3# 2min  3# 2/10  3# 2/10 3# 2/10   PNF D1 L3 2x10 L3 2x10 L2 2x10 L2 2/10  L3 2/10  L3 2/10 L3 2x10   PNF D2 L1 2x10 L1 2x10 L2 2x10 L2 2/10  L1 2/10  L1 2/10 L1 2x10   Bodyblade 30"x3 way 30"x3 way 30"x3 way 30"x3 way  30"x3 ways nv 30" x3 way                Ther Ex            Pulleys 30x 30x 30x 30x  30x  30x 30x    Prone horiz abd palm down 2# 2x10 2# 2x10 2# 2x10 2# 2/10  2# 2/10  2# 2/10 2# 2x10    Prone scaption 2# 2x10 2# 2x10 2# 2x10 2# 2/10  2# 2/10  2# 2/10 2# 2x10   TB Mid Row L5 2x10 L5 2x10 2x10 p4 P5 30x  L4 2/10  L4 2/10 L4 2x10   TB Low Row L5 2x10 L5 2x10 2x10 p4 P4 30x  L4 2/10  L4 2/10 L4 2x10   TB Biceps L5 2x10 L5 2x10 2x10 p4 P4 25x  L4 2/10  L4 2/10  L4 2/10   TB Triceps L5 2x10 L5 2x10 2x10 p4 P5 30x  L4 2/10  L4 2/10  L4 2/10   TB IR L4 2x10 L4 2x10 2x10 p1 P1 3/10  L4 2/10 L4 2/10 L4 2x10   TB ER L4 2x10 L4 2x10 L 4 2x10 L4 2/10  L4 2/10  L4 2/10 L4 2x10   IR strap  10"x5 10"x5 10' x5  10"x5  10"x5  nv 5x10"   SL Flex 2# 2x10 2# 2x10 2# 2x10 2# 2/10  2# 3/10   2# 3/10 2# 2x10   SL ABD/horizontal 2# 2x10 2# 2x10 2# 2x10 2# 2/10  2# 3/10  2# 3/10 2# 2x10   SL ER 2# 2x10 2# 2x10 2# 2x10 2# 2/10  2# 3/10  2# 3/10 2# 2x10   Ther Activity            Reaching            Carrying            Lifting                         Modalities            CP  10 def

## 2021-04-08 ENCOUNTER — OFFICE VISIT (OUTPATIENT)
Dept: PHYSICAL THERAPY | Facility: CLINIC | Age: 66
End: 2021-04-08
Payer: MEDICARE

## 2021-04-08 DIAGNOSIS — M75.41 IMPINGEMENT SYNDROME OF RIGHT SHOULDER: Primary | ICD-10-CM

## 2021-04-08 PROCEDURE — 97112 NEUROMUSCULAR REEDUCATION: CPT

## 2021-04-08 PROCEDURE — 97110 THERAPEUTIC EXERCISES: CPT

## 2021-04-08 PROCEDURE — 97140 MANUAL THERAPY 1/> REGIONS: CPT

## 2021-04-08 NOTE — PROGRESS NOTES
Daily Note     Today's date: 2021  Patient name: Dinora Cisneros  : 1955  MRN: [id number]  Referring provider: Lisy Montalvo*  Dx:   Encounter Diagnosis     ICD-10-CM    1  Impingement syndrome of right shoulder  M75 41                   Subjective: Patient stated that his shoulder is moving a little better  Objective: See treatment diary below      Assessment: Tolerated treatment well  Patient continues to be motivated for PT  He is progressing well with strengthening, will continue to work on improving ROM  Patient would benefit from continued PT  Plan: Continue per plan of care  Precautions: RCR 20     8 Weeks:  21+ TB ER/IR, SL ER to PRE, Prone hz abd thumb up, PNF, stabilization  10 weeks:  21 + blackburns, continue to progress PREs         Manuals 3/25 3/29 3/31 4/5 4/7  3/18 3/22   Manual stretch R' S' and E' MM  MB RK   AFB MB   STM R' Biceps/  Lateral arm   MM  MB RK   +R pec  AFB MB   Supine 4 way TB       nv                Neuro Re-Ed           Ball Stabilization 3# 2x10 3# 2x10 3# 2x10 3# 2min 3# 2min  3# 2/10 3# 2/10   PNF D1 L3 2x10 L3 2x10 L2 2x10 L2 2/10 L3 2x10  L3 2/10 L3 2x10   PNF D2 L1 2x10 L1 2x10 L2 2x10 L2 2/10 L3 2x10  L1 2/10 L1 2x10   Bodyblade 30"x3 way 30"x3 way 30"x3 way 30"x3 way 30" 3 way nv 30" x3 way               Ther Ex           Pulleys 30x 30x 30x 30x 30x  30x 30x    Prone horiz abd palm down 2# 2x10 2# 2x10 2# 2x10 2# 2/10 2# 2/10  2# 2/10 2# 2x10    Prone scaption 2# 2x10 2# 2x10 2# 2x10 2# 2/10 2# 2/10  2# 2/10 2# 2x10   TB Mid Row L5 2x10 L5 2x10 2x10 p4 P5 30x P5 30x  L4 2/10 L4 2x10   TB Low Row L5 2x10 L5 2x10 2x10 p4 P4 30x P4 25x  L4 2/10 L4 2x10   TB Biceps L5 2x10 L5 2x10 2x10 p4 P4 25x P4 25x  L4 2/10  L4 2/10   TB Triceps L5 2x10 L5 2x10 2x10 p4 P5 30x P5 30x  L4 2/10  L4 2/10   TB IR L4 2x10 L4 2x10 2x10 p1 P1 3/10 P1 3/10 L4 2/10 L4 2x10   TB ER L4 2x10 L4 2x10 L 4 2x10 L4 2/10 L4 2x10  L4 2/10 L4 2x10   IR strap  10"x5 10"x5 10' x5  10"x5 10"x5  nv 5x10"   SL Flex 2# 2x10 2# 2x10 2# 2x10 2# 2/10 2# 2/10   2# 3/10 2# 2x10   SL ABD/horizontal 2# 2x10 2# 2x10 2# 2x10 2# 2/10 2# 2/10  2# 3/10 2# 2x10   SL ER 2# 2x10 2# 2x10 2# 2x10 2# 2/10 2# 2/10  2# 3/10 2# 2x10   Ther Activity           Reaching           Carrying           Lifting                       Modalities           CP  10 def

## 2021-04-12 ENCOUNTER — OFFICE VISIT (OUTPATIENT)
Dept: PHYSICAL THERAPY | Facility: CLINIC | Age: 66
End: 2021-04-12
Payer: MEDICARE

## 2021-04-12 DIAGNOSIS — M75.41 IMPINGEMENT SYNDROME OF RIGHT SHOULDER: Primary | ICD-10-CM

## 2021-04-12 PROCEDURE — 97112 NEUROMUSCULAR REEDUCATION: CPT

## 2021-04-12 PROCEDURE — 97110 THERAPEUTIC EXERCISES: CPT

## 2021-04-12 PROCEDURE — 97140 MANUAL THERAPY 1/> REGIONS: CPT

## 2021-04-12 NOTE — PROGRESS NOTES
PT Re-Evaluation     Today's date: 4/15/2021  Patient name: Ned Starkey  : 1955  MRN: [id number]  Referring provider: Jessi Gillette*  Dx:   Encounter Diagnosis     ICD-10-CM    1  Impingement syndrome of right shoulder  M75 41                   Assessment  Assessment details:   CURRENT FUNCTIONAL STATUS    Dressing tolerance: Independent  Bathing/washing hair tolerance: Fair/Good with right arm  Able to reach above overhead with right arm with moderate tightness  Able to lift 10 lbs with right arm to shoulder level  Work Status: Able to farm    SHORT TERM GOALS (2 WEEKS)    Increase shoulder AROM and PROM by 10 degrees  Decrease pain to 0-1/10  Dressing tolerance: Independent  Bathing/washing hair tolerance: Good with right arm  Able to reach overhead with right arm with minimal tightness  Able to lift 15 lbs with right arm to shoulder level  LONG TERM GOALS (DISCHARGE)    Shoulder AROM: F=150 deg, ABD=160 deg, ER=45 deg, IR BTB=T-L JCT -partially met  Shoulder PROM: F=155 deg, ABD=165 deg, ER=50 deg, IR=60 deg -partially met  Shoulder Strength: F=57 lbs, ABD=53 lbs, ER=25 lbs, IR=43 lbs -partially met   Decrease pain to 0-2/10 -met   Bathing/washing hair tolerance: Good with right arm -partially met   Able to reach overhead with the right arm with minimal tightness -partially met   Able to lift 10 lbs above shoulder level with the right arm -met  Work Status: Able to farm- met    Understanding of Dx/Px/POC: good   Prognosis: good    Goals  See assessment details above  Plan  Plan details: The patient has shown improvement in PT demonstrating decreased pain, increased range of motion, increased strength, and increased tolerance to activity  The patient continues to present with pain, decreased ROM, decreased strength, and decreased tolerance to activity  The patient would benefit from continued skilled PT services to address these issues and to maximize function  The patient will also continue performing their HEP  Patient would benefit from: skilled physical therapy  Planned modality interventions: unattended electrical stimulation, thermotherapy: hydrocollator packs and cryotherapy  Planned therapy interventions: manual therapy, neuromuscular re-education, therapeutic activities and therapeutic exercise  Frequency: 2x week  Duration in weeks: 4        Subjective Evaluation    History of Present Illness  Mechanism of injury: Subjective The patient's right shoulder pain is now intermittent  Sharp twinges of pain in the shoulder with radiation into the upper arm continue to be present when reaching backward or overhead  He can reach above shoulder level, but not overhead  He can lift 10 pounds to shoulder level with the right arm  Pain  Current pain ratin  At best pain ratin  At worst pain ratin    Hand dominance: right    Patient Goals  Patient goals for therapy: decreased pain, increased motion, increased strength and return to work          Objective     General Comments:      Shoulder Comments   CURRENT OBJECTIVE MEASUREMENTS    Shoulder AROM: F=130 deg, ABD=130 deg, ER=30 deg, IR BTB=Sacrum  Shoulder PROM: F=140 deg, ABD=140 deg, ER=35 deg, IR=50 deg  Shoulder Strength: F=59 lbs, ABD=60 lbs, ER=23 lbs, IR=47 lbs                  Precautions: RCR 20     8 Weeks:  21+ TB ER/IR, SL ER to PRE, Prone hz abd thumb up, PNF, stabilization  10 weeks:  21 + blackburns, continue to progress PREs         Manuals 3/25 3/29 3/31 4/5 4/ 415   Manual stretch R' S' and E' MM   MB RK   MB RK   STM R' Biceps/  Lateral arm   MM   MB RK   MB    Supine 4 way TB                                   Neuro Re-Ed                 Ball Stabilization 3# 2x10 3# 2x10 3# 2x10 3# 2min 3# 2min 3# 2 min 3# 2 min   PNF D1 L3 2x10 L3 2x10 L2 2x10 L2 2/10 L3 2x10 L3 2x10 L3 2x10   PNF D2 L1 2x10 L1 2x10 L2 2x10 L2 2/10 L3 2x10 L3 2x10 L3 2x10   Bodyblade 30"x3 way 30"x3 way 30"x3 way 30"x3 way 30" 3 way 30" 3 way 30" x3 way    Perturbations           3x20"  3x20"   Ther Ex                 Pulleys 30x 30x 30x 30x 30x 30x 30x    Prone horiz abd palm down 2# 2x10 2# 2x10 2# 2x10 2# 2/10 2# 2/10 2# 2/10 3# 2x10    Prone scaption 2# 2x10 2# 2x10 2# 2x10 2# 2/10 2# 2/10 2# 2/10 3# 2x10   TB Mid Row L5 2x10 L5 2x10 2x10 p4 P5 30x P5 30x p6 30x P6 3x10   TB Low Row L5 2x10 L5 2x10 2x10 p4 P4 30x P4 25x p5 25x P5 25x   TB Biceps L5 2x10 L5 2x10 2x10 p4 P4 25x P4 25x P5 25x  P5 25x   TB Triceps L5 2x10 L5 2x10 2x10 p4 P5 30x P5 30x p6 30 P6 3/10   TB IR L4 2x10 L4 2x10 2x10 p1 P1 3/10 P1 3/10 p1 3x10 P1 3x10   TB ER L4 2x10 L4 2x10 L 4 2x10 L4 2/10 L4 2x10 L4 2x10 L4 2x10   IR strap  10"x5 10"x5 10' x5  10"x5 10"x5 10"x5 5x10"   SL Flex 2# 2x10 2# 2x10 2# 2x10 2# 2/10 2# 2/10 2# 2/10 2# 2x10   SL ABD/horizontal 2# 2x10 2# 2x10 2# 2x10 2# 2/10 2# 2/10 2# 2/10 2# 2x10   SL ER 2# 2x10 2# 2x10 2# 2x10 2# 2/10 2# 2/10 2# 2/10 2# 2x10   Ther Activity                 Reaching           [employer] stack NV     Carrying                 Lifting                                   Modalities                 CP   10 def

## 2021-04-12 NOTE — PROGRESS NOTES
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  Objective: See treatment diary below      Assessment: Tolerated treatment well  Patient is able to progress with strengthening, demonstrating good form  Fatigues with stability tasks  Will continue to progress with focus on ROM  Patient would benefit from continued PT  Plan: Continue per plan of care  Precautions: RCR 20     8 Weeks:  21+ TB ER/IR, SL ER to PRE, Prone hz abd thumb up, PNF, stabilization  10 weeks:  21 + blackburns, continue to progress PREs         Manuals 3/25 3/29 3/31 4/5 4/7 4/12 3/22   Manual stretch R' S' and E' MM  MB RK  MB MB   STM R' Biceps/  Lateral arm   MM  MB RK  MB MB   Supine 4 way TB                     Neuro Re-Ed          Ball Stabilization 3# 2x10 3# 2x10 3# 2x10 3# 2min 3# 2min 3# 2 min 3# 2/10   PNF D1 L3 2x10 L3 2x10 L2 2x10 L2 2/10 L3 2x10 L3 2x10 L3 2x10   PNF D2 L1 2x10 L1 2x10 L2 2x10 L2 2/10 L3 2x10 L3 2x10 L1 2x10   Bodyblade 30"x3 way 30"x3 way 30"x3 way 30"x3 way 30" 3 way 30" 3 way 30" x3 way    Perturbations      3x20"    Ther Ex          Pulleys 30x 30x 30x 30x 30x 30x 30x    Prone horiz abd palm down 2# 2x10 2# 2x10 2# 2x10 2# 2/10 2# 2/10 2# 2/10 2# 2x10    Prone scaption 2# 2x10 2# 2x10 2# 2x10 2# 2/10 2# 2/10 2# 2/10 2# 2x10   TB Mid Row L5 2x10 L5 2x10 2x10 p4 P5 30x P5 30x p6 30x L4 2x10   TB Low Row L5 2x10 L5 2x10 2x10 p4 P4 30x P4 25x p5 25x L4 2x10   TB Biceps L5 2x10 L5 2x10 2x10 p4 P4 25x P4 25x P5 25x  L4 2/10   TB Triceps L5 2x10 L5 2x10 2x10 p4 P5 30x P5 30x p6 30  L4 2/10   TB IR L4 2x10 L4 2x10 2x10 p1 P1 3/10 P1 3/10 p1 3x10 L4 2x10   TB ER L4 2x10 L4 2x10 L 4 2x10 L4 2/10 L4 2x10 L4 2x10 L4 2x10   IR strap  10"x5 10"x5 10' x5  10"x5 10"x5 10"x5 5x10"   SL Flex 2# 2x10 2# 2x10 2# 2x10 2# 2/10 2# 2/10 2# 2/10 2# 2x10   SL ABD/horizontal 2# 2x10 2# 2x10 2# 2x10 2# 2/10 2# 2/10 2# 2/10 2# 2x10   SL ER 2# 2x10 2# 2x10 2# 2x10 2# 2/10 2# 2/10 2# 2/10 2# 2x10   Ther Activity          Reaching      [employer] stack NV    Carrying          Lifting                     Modalities          CP  10 def

## 2021-04-15 ENCOUNTER — EVALUATION (OUTPATIENT)
Dept: PHYSICAL THERAPY | Facility: CLINIC | Age: 66
End: 2021-04-15
Payer: MEDICARE

## 2021-04-15 ENCOUNTER — TRANSCRIBE ORDERS (OUTPATIENT)
Dept: PHYSICAL THERAPY | Facility: CLINIC | Age: 66
End: 2021-04-15

## 2021-04-15 DIAGNOSIS — M75.41 IMPINGEMENT SYNDROME OF RIGHT SHOULDER: Primary | ICD-10-CM

## 2021-04-15 PROCEDURE — 97112 NEUROMUSCULAR REEDUCATION: CPT | Performed by: PHYSICAL THERAPIST

## 2021-04-15 PROCEDURE — 97110 THERAPEUTIC EXERCISES: CPT | Performed by: PHYSICAL THERAPIST

## 2021-04-15 PROCEDURE — 97140 MANUAL THERAPY 1/> REGIONS: CPT | Performed by: PHYSICAL THERAPIST

## 2021-04-15 NOTE — LETTER
April 15, 2021    Lizandro Bruce MD  [address] [facility] [address]    Patient: Lv Fox   YOB: 1955   Date of Visit: 4/15/2021     Encounter Diagnosis     ICD-10-CM    1  Impingement syndrome of right shoulder  M75 41        Dear Dr Vinita Pierce:    Thank you for your recent referral of Lv Fox  Please review the attached evaluation summary from Javy's recent visit  Please verify that you agree with the plan of care by signing the attached order  If you have any questions or concerns, please do not hesitate to call  I sincerely appreciate the opportunity to share in the care of one of your patients and hope to have another opportunity to work with you in the near future  Sincerely,    Colletta Colt, PT      Referring Provider:      I certify that I have read the below Plan of Care and certify the need for these services furnished under this plan of treatment while under my care  Lizandro Bruce MD  [address]  Via Fax: [phone]          PT Re-Evaluation     Today's date: 4/15/2021  Patient name: Lv Fox  : 1955  MRN: [id number]  Referring provider: Lexi Camarena*  Dx:   Encounter Diagnosis     ICD-10-CM    1  Impingement syndrome of right shoulder  M75 41                   Assessment  Assessment details:   CURRENT FUNCTIONAL STATUS    Dressing tolerance: Independent  Bathing/washing hair tolerance: Fair/Good with right arm  Able to reach above overhead with right arm with moderate tightness  Able to lift 10 lbs with right arm to shoulder level  Work Status: Able to farm    SHORT TERM GOALS (2 WEEKS)    Increase shoulder AROM and PROM by 10 degrees  Decrease pain to 0-1/10  Dressing tolerance: Independent  Bathing/washing hair tolerance: Good with right arm  Able to reach overhead with right arm with minimal tightness      Able to lift 15 lbs with right arm to shoulder level  LONG TERM GOALS (DISCHARGE)    Shoulder AROM: F=150 deg, ABD=160 deg, ER=45 deg, IR BTB=T-L JCT -partially met  Shoulder PROM: F=155 deg, ABD=165 deg, ER=50 deg, IR=60 deg -partially met  Shoulder Strength: F=57 lbs, ABD=53 lbs, ER=25 lbs, IR=43 lbs -partially met   Decrease pain to 0-2/10 -met   Bathing/washing hair tolerance: Good with right arm -partially met   Able to reach overhead with the right arm with minimal tightness -partially met   Able to lift 10 lbs above shoulder level with the right arm -met  Work Status: Able to farm- met    Understanding of Dx/Px/POC: good   Prognosis: good    Goals  See assessment details above  Plan  Plan details: The patient has shown improvement in PT demonstrating decreased pain, increased range of motion, increased strength, and increased tolerance to activity  The patient continues to present with pain, decreased ROM, decreased strength, and decreased tolerance to activity  The patient would benefit from continued skilled PT services to address these issues and to maximize function  The patient will also continue performing their HEP  Patient would benefit from: skilled physical therapy  Planned modality interventions: unattended electrical stimulation, thermotherapy: hydrocollator packs and cryotherapy  Planned therapy interventions: manual therapy, neuromuscular re-education, therapeutic activities and therapeutic exercise  Frequency: 2x week  Duration in weeks: 4        Subjective Evaluation    History of Present Illness  Mechanism of injury: Subjective The patient's right shoulder pain is now intermittent  Sharp twinges of pain in the shoulder with radiation into the upper arm continue to be present when reaching backward or overhead  He can reach above shoulder level, but not overhead  He can lift 10 pounds to shoulder level with the right arm    Pain  Current pain ratin  At best pain ratin  At worst pain rating: 2    Hand dominance: right    Patient Goals  Patient goals for therapy: decreased pain, increased motion, increased strength and return to work          Objective     General Comments:      Shoulder Comments   CURRENT OBJECTIVE MEASUREMENTS    Shoulder AROM: F=130 deg, ABD=130 deg, ER=30 deg, IR BTB=Sacrum  Shoulder PROM: F=140 deg, ABD=140 deg, ER=35 deg, IR=50 deg  Shoulder Strength: F=59 lbs, ABD=60 lbs, ER=23 lbs, IR=47 lbs                  Precautions: RCR 11/30/20     8 Weeks:  1/25/21+ TB ER/IR, SL ER to PRE, Prone hz abd thumb up, PNF, stabilization  10 weeks:  2/8/21 + blackburns, continue to progress PREs         Manuals 3/25 3/29 3/31 4/5 4/7 4/12 4/15   Manual stretch R' S' and E' MM   MB RK   MB RK   STM R' Biceps/  Lateral arm   MM   MB RK   MB    Supine 4 way TB                                   Neuro Re-Ed                 Ball Stabilization 3# 2x10 3# 2x10 3# 2x10 3# 2min 3# 2min 3# 2 min 3# 2 min   PNF D1 L3 2x10 L3 2x10 L2 2x10 L2 2/10 L3 2x10 L3 2x10 L3 2x10   PNF D2 L1 2x10 L1 2x10 L2 2x10 L2 2/10 L3 2x10 L3 2x10 L3 2x10   Bodyblade 30"x3 way 30"x3 way 30"x3 way 30"x3 way 30" 3 way 30" 3 way 30" x3 way    Perturbations           3x20"  3x20"   Ther Ex                 Pulleys 30x 30x 30x 30x 30x 30x 30x    Prone horiz abd palm down 2# 2x10 2# 2x10 2# 2x10 2# 2/10 2# 2/10 2# 2/10 3# 2x10    Prone scaption 2# 2x10 2# 2x10 2# 2x10 2# 2/10 2# 2/10 2# 2/10 3# 2x10   TB Mid Row L5 2x10 L5 2x10 2x10 p4 P5 30x P5 30x p6 30x P6 3x10   TB Low Row L5 2x10 L5 2x10 2x10 p4 P4 30x P4 25x p5 25x P5 25x   TB Biceps L5 2x10 L5 2x10 2x10 p4 P4 25x P4 25x P5 25x  P5 25x   TB Triceps L5 2x10 L5 2x10 2x10 p4 P5 30x P5 30x p6 30 P6 3/10   TB IR L4 2x10 L4 2x10 2x10 p1 P1 3/10 P1 3/10 p1 3x10 P1 3x10   TB ER L4 2x10 L4 2x10 L 4 2x10 L4 2/10 L4 2x10 L4 2x10 L4 2x10   IR strap  10"x5 10"x5 10' x5  10"x5 10"x5 10"x5 5x10"   SL Flex 2# 2x10 2# 2x10 2# 2x10 2# 2/10 2# 2/10 2# 2/10 2# 2x10   SL ABD/horizontal 2# 2x10 2# 2x10 2# 2x10 2# 2/10 2# 2/10 2# 2/10 2# 2x10   SL ER 2# 2x10 2# 2x10 2# 2x10 2# 2/10 2# 2/10 2# 2/10 2# 2x10   Ther Activity                 Reaching           [place]     Carrying                 Lifting                                   Modalities                 CP   10 def

## 2021-04-19 ENCOUNTER — OFFICE VISIT (OUTPATIENT)
Dept: PHYSICAL THERAPY | Facility: CLINIC | Age: 66
End: 2021-04-19
Payer: MEDICARE

## 2021-04-19 DIAGNOSIS — M75.41 IMPINGEMENT SYNDROME OF RIGHT SHOULDER: Primary | ICD-10-CM

## 2021-04-19 PROCEDURE — 97112 NEUROMUSCULAR REEDUCATION: CPT

## 2021-04-19 PROCEDURE — 97140 MANUAL THERAPY 1/> REGIONS: CPT

## 2021-04-19 NOTE — PROGRESS NOTES
Daily Note     Today's date: 2021  Patient name: Yoana Murphy  : 1955  MRN: [id number]  Referring provider: Pam Kern*  Dx:   Encounter Diagnosis     ICD-10-CM    1  Impingement syndrome of right shoulder  M75 41                   Subjective: Patient stated that his shoulder is feeling a bit sore form shoveling over the weekend  Objective: See treatment diary below      Assessment: Tolerated treatment well  Patient demonstrated good TE for throughout session  Patient demonstrated fatigue post session and would benefit from continued PT  Plan: Continue per plan of care  Precautions: RCR 20     8 Weeks:  21+ TB ER/IR, SL ER to PRE, Prone hz abd thumb up, PNF, stabilization  10 weeks:  21 + kathy, continue to progress PREs         Manuals 4/19  3/31 4/5 4/7 4/12 4/15   Manual stretch R' S' and E' MB  MB RK   MB RK   STM R' Biceps/  Lateral arm     MB RK   MB    Supine 4 way TB                               Neuro Re-Ed               Ball Stabilization 3# 2 min  3# 2x10 3# 2min 3# 2min 3# 2 min 3# 2 min   PNF D1 L3 2x10  L2 2x10 L2 2/10 L3 2x10 L3 2x10 L3 2x10   PNF D2 L3 2x10  L2 2x10 L2 2/10 L3 2x10 L3 2x10 L3 2x10   Bodyblade 30" x3 way  30"x3 way 30"x3 way 30" 3 way 30" 3 way 30" x3 way    Perturbations 3x20"        3x20"  3x20"   Ther Ex               Pulleys 30x  30x 30x 30x 30x 30x    Prone horiz abd palm down 3# 2x10  2# 2x10 2# 2/10 2# 2/10 2# 2/10 3# 2x10    Prone scaption 3# 2x10  2# 2x10 2# 2/10 2# 2/10 2# 2/10 3# 2x10   TB Mid Row P6 3x10  2x10 p4 P5 30x P5 30x p6 30x P6 3x10   TB Low Row P5 25x   2x10 p4 P4 30x P4 25x p5 25x P5 25x   TB Biceps P6 3x20  2x10 p4 P4 25x P4 25x P5 25x  P5 25x   TB Triceps P6 3x10  2x10 p4 P5 30x P5 30x p6 30 P6 3/10   TB IR P2 3x10  2x10 p1 P1 3/10 P1 3/10 p1 3x10 P1 3x10   TB ER L4 2x10  L 4 2x10 L4 2/10 L4 2x10 L4 2x10 L4 2x10   IR strap  5x10"  10' x5  10"x5 10"x5 10"x5 5x10"   SL Flex 2# 2x10  2# 2x10 2# 2/10 2# 2/10 2# 2/10 2# 2x10   SL ABD/horizontal 2# 2x10  2# 2x10 2# 2/10 2# 2/10 2# 2/10 2# 2x10   SL ER 3# 2x10  2# 2x10 2# 2/10 2# 2/10 2# 2/10 2# 2x10   Ther Activity               Reaching         [facility] [place]     Carrying               Lifting                               Modalities               CP   def

## 2021-04-22 ENCOUNTER — OFFICE VISIT (OUTPATIENT)
Dept: PHYSICAL THERAPY | Facility: CLINIC | Age: 66
End: 2021-04-22
Payer: MEDICARE

## 2021-04-22 DIAGNOSIS — M75.41 IMPINGEMENT SYNDROME OF RIGHT SHOULDER: Primary | ICD-10-CM

## 2021-04-22 PROCEDURE — 97112 NEUROMUSCULAR REEDUCATION: CPT | Performed by: PHYSICAL THERAPIST

## 2021-04-22 PROCEDURE — 97140 MANUAL THERAPY 1/> REGIONS: CPT | Performed by: PHYSICAL THERAPIST

## 2021-04-22 PROCEDURE — 97110 THERAPEUTIC EXERCISES: CPT | Performed by: PHYSICAL THERAPIST

## 2021-04-22 NOTE — PROGRESS NOTES
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  His main complaint is tightness of the shoulder  He would like to continue treatment until he sees his surgeon on 21  Objective: See treatment diary below      Assessment: Tolerated treatment well  Patient demonstrated fatigue post treatment      Plan: Progress treatment as tolerated              Precautions: RCR 20     8 Weeks:  21+ TB ER/IR, SL ER to PRE, Prone hz abd thumb up, PNF, stabilization  10 weeks:  21 + blackburns, continue to progress PREs         Manuals 4/19  4-22 3/31 4/5 4/7 4/12 4/15   Manual stretch R' S' and E' MB  RK MB RK   MB RK   STM R' Biceps/  Lateral arm      MB RK   MB     Supine 4 way TB                                   Neuro Re-Ed                 Ball Stabilization 3# 2 min  3# 2 min 3# 2x10 3# 2min 3# 2min 3# 2 min 3# 2 min   PNF D1 L3 2x10  L3 2/10 L2 2x10 L2 2/10 L3 2x10 L3 2x10 L3 2x10   PNF D2 L3 2x10  L3 2/10 L2 2x10 L2 2/10 L3 2x10 L3 2x10 L3 2x10   Bodyblade 30" x3 way  30"x 3 way 30"x3 way 30"x3 way 30" 3 way 30" 3 way 30" x3 way    Perturbations 3x20"  3x20"       3x20"  3x20"   Ther Ex                 Pulleys 30x  30x 30x 30x 30x 30x 30x    Prone horiz abd palm down 3# 2x10  3# 2/10 2# 2x10 2# 2/10 2# 2/10 2# 2/10 3# 2x10    Prone scaption 3# 2x10  3# 2/10 2# 2x10 2# 2/10 2# 2/10 2# 2/10 3# 2x10   TB Mid Row P6 3x10  P6 3x10 2x10 p4 P5 30x P5 30x p6 30x P6 3x10   TB Low Row P5 25x   P5 3x10 2x10 p4 P4 30x P4 25x p5 25x P5 25x   TB Biceps P6 3x20  P6 3x10 2x10 p4 P4 25x P4 25x P5 25x  P5 25x   TB Triceps P6 3x10  P6 3x10 2x10 p4 P5 30x P5 30x p6 30 P6 3/10   TB IR P2 3x10  P2 3/10 2x10 p1 P1 3/10 P1 3/10 p1 3x10 P1 3x10   TB ER L4 2x10  L4 2/10 L 4 2x10 L4 2/10 L4 2x10 L4 2x10 L4 2x10   IR strap  5x10"  5x10" 10' x5  10"x5 10"x5 10"x5 5x10"   SL Flex 2# 2x10  2# 2/10 2# 2x10 2# 2/10 2# 2/10 2# 2/10 2# 2x10   SL ABD/horizontal 2# 2x10  2# 2/10 2# 2x10 2# 2/10 2# 2/10 2# 2/10 2# 2x10   SL ER 3# 2x10  3# 2/10 2# 2x10 2# 2/10 2# 2/10 2# 2/10 2# 2x10   Ther Activity                 Reaching           [facility] [place]     Carrying                 Lifting                                   Modalities                 CP     def

## 2021-04-26 ENCOUNTER — OFFICE VISIT (OUTPATIENT)
Dept: PHYSICAL THERAPY | Facility: CLINIC | Age: 66
End: 2021-04-26
Payer: MEDICARE

## 2021-04-26 DIAGNOSIS — M75.41 IMPINGEMENT SYNDROME OF RIGHT SHOULDER: Primary | ICD-10-CM

## 2021-04-26 PROCEDURE — 97112 NEUROMUSCULAR REEDUCATION: CPT | Performed by: PHYSICAL THERAPIST

## 2021-04-26 PROCEDURE — 97140 MANUAL THERAPY 1/> REGIONS: CPT | Performed by: PHYSICAL THERAPIST

## 2021-04-26 PROCEDURE — 97110 THERAPEUTIC EXERCISES: CPT | Performed by: PHYSICAL THERAPIST

## 2021-04-26 NOTE — PROGRESS NOTES
Daily Note     Today's date: 2021  Patient name: Aditya Bacon  : 1955  MRN: [id number]  Referring provider: Nellie Pires*  Dx:   Encounter Diagnosis     ICD-10-CM    1  Impingement syndrome of right shoulder  M75 41                   Subjective: The patient states that he is not having any pain this morning, just the usual stiffness  He will be going back to see the doctor next Wednesday for his follow up appointment  Objective: See treatment diary below  Assessment: Patient demonstrates almost full PROM for flexion and abduction, still mostly limited with ER  Tight end feel noted for this  No increased in pain noted during session today  Continued PT would be beneficial to improve function  Plan: Continue per plan of care             Precautions: RCR 20     8 Weeks:  21+ TB ER/IR, SL ER to PRE, Prone hz abd thumb up, PNF, stabilization  10 weeks:  21 + blackburns, continue to progress PREs         Manuals -22 4/26 4/5 4/7 4/12 4/15   Manual stretch R' S' and E' MB  RK ML RK   MB RK   STM R' Biceps/  Lateral arm       RK   MB     Supine 4 way TB                                   Neuro Re-Ed                 Ball Stabilization 3# 2 min  3# 2 min 3# 2x10 3# 2min 3# 2min 3# 2 min 3# 2 min   PNF D1 L3 2x10  L3 2/10 L3 2x10 L2 2/10 L3 2x10 L3 2x10 L3 2x10   PNF D2 L3 2x10  L3 2/10 L3 2x10 L2 2/10 L3 2x10 L3 2x10 L3 2x10   Bodyblade 30" x3 way  30"x 3 way 30"x3 way 30"x3 way 30" 3 way 30" 3 way 30" x3 way    Perturbations 3x20"  3x20" 3 x 20"     3x20"  3x20"   Ther Ex                 Pulleys 30x  30x 30x 30x 30x 30x 30x   Prone horiz abd palm down 3# 2x10  3# 2/10 3# 2x10 2# 2/10 2# 2/10 2# 2/10 3# 2x10   Prone scaption 3# 2x10  3# 2/10 3# 2x10 2# 2/10 2# 2/10 2# 2/10 3# 2x10   TB Mid Row P6 3x10  P6 3x10 3x10 P6 P5 30x P5 30x p6 30x P6 3x10   TB Low Row P5 25x   P5 3x10 3x10 P5 P4 30x P4 25x p5 25x P5 25x   TB Biceps P6 3x20  P6 3x10 3x10 P6 P4 25x P4 25x P5 25x  P5 25x   TB Triceps P6 3x10  P6 3x10 3x10 P6 P5 30x P5 30x p6 30 P6 3/10   TB IR P2 3x10  P2 3/10 3x10 P2 P1 3/10 P1 3/10 p1 3x10 P1 3x10   TB ER L4 2x10  L4 2/10 L4 2x10 L4 2/10 L4 2x10 L4 2x10 L4 2x10   IR strap  5x10"  5x10" 10" x5  10"x5 10"x5 10"x5 5x10"   SL Flex 2# 2x10  2# 2/10 3# 2x10 2# 2/10 2# 2/10 2# 2/10 2# 2x10   SL ABD/horizontal 2# 2x10  2# 2/10 3# 2x10 2# 2/10 2# 2/10 2# 2/10 2# 2x10   SL ER 3# 2x10  3# 2/10 3# 2x10 2# 2/10 2# 2/10 2# 2/10 2# 2x10   Ther Activity                 Reaching           [facility] [place]     Carrying                 Lifting                                   Modalities                 CP

## 2021-04-29 ENCOUNTER — OFFICE VISIT (OUTPATIENT)
Dept: PHYSICAL THERAPY | Facility: CLINIC | Age: 66
End: 2021-04-29
Payer: MEDICARE

## 2021-04-29 DIAGNOSIS — M75.41 IMPINGEMENT SYNDROME OF RIGHT SHOULDER: Primary | ICD-10-CM

## 2021-04-29 PROCEDURE — 97112 NEUROMUSCULAR REEDUCATION: CPT | Performed by: PHYSICAL THERAPIST

## 2021-04-29 PROCEDURE — 97110 THERAPEUTIC EXERCISES: CPT | Performed by: PHYSICAL THERAPIST

## 2021-04-29 PROCEDURE — 97140 MANUAL THERAPY 1/> REGIONS: CPT | Performed by: PHYSICAL THERAPIST

## 2021-04-29 NOTE — PROGRESS NOTES
Daily Note     Today's date: 2021  Patient name: Rober Rowley  : 1955  MRN: [id number]  Referring provider: April Ruiz  Dx:   Encounter Diagnosis     ICD-10-CM    1  Impingement syndrome of right shoulder  M75 41                   Subjective: The patient states that he was working in his garden yesterday and his shoulder is sore today from doing that  He will be going back to see the doctor next week for his follow up appointment  Objective: See treatment diary below  Assessment: Despite being more sore today, he was able to complete all TE as outlined below in daily treatment diary  No increase in pain noted during session today but with fatigue with completing TE  Pain level remains the same to end session  Plan: Continue per plan of care  Plan to see patient for one more visit prior to his doctor appointment and to await any recommendations              Precautions: RCR 20     8 Weeks:  21+ TB ER/IR, SL ER to PRE, Prone hz abd thumb up, PNF, stabilization  10 weeks:  21 + blackburns, continue to progress PREs         Manuals -22 4/26 4/29 4/7 4/12 4/15   Manual stretch R' S' and E' MB  RK ML ML   MB RK   STM R' Biceps/  Lateral arm          MB     Supine 4 way TB                                   Neuro Re-Ed                 Ball Stabilization 3# 2 min  3# 2 min 3# 2x10 3# 2min 3# 2min 3# 2 min 3# 2 min   PNF D1 L3 2x10  L3 2/10 L3 2x10 L3 2/10 L3 2x10 L3 2x10 L3 2x10   PNF D2 L3 2x10  L3 2/10 L3 2x10 L3 2/10 L3 2x10 L3 2x10 L3 2x10   Bodyblade 30" x3 way  30"x 3 way 30"x3 way 30"x3 way 30" 3 way 30" 3 way 30" x3 way    Perturbations 3x20"  3x20" 3 x 20" 3 x 20"   3x20"  3x20"   Ther Ex                 Pulleys 30x  30x 30x 30x 30x 30x 30x   Prone horiz abd palm down 3# 2x10  3# 2/10 3# 2x10 3# 2/10 2# 2/10 2# 2/10 3# 2x10   Prone scaption 3# 2x10  3# 2/10 3# 2x10 3# 2/10 2# 2/10 2# 2/10 3# 2x10   TB Mid Row P6 3x10  P6 3x10 3x10 P6 P6 30x P5 30x p6 30x P6 3x10   TB Low Row P5 25x   P5 3x10 3x10 P5 P5 30x P4 25x p5 25x P5 25x   TB Biceps P6 3x20  P6 3x10 3x10 P6 P6 25x P4 25x P5 25x  P5 25x   TB Triceps P6 3x10  P6 3x10 3x10 P6 P6 30x P5 30x p6 30 P6 3/10   TB IR P2 3x10  P2 3/10 3x10 P2 P2 3/10 P1 3/10 p1 3x10 P1 3x10   TB ER L4 2x10  L4 2/10 L4 2x10 L4 2/10 L4 2x10 L4 2x10 L4 2x10   IR strap  5x10"  5x10" 10" x5  10"x5 10"x5 10"x5 5x10"   SL Flex 2# 2x10  2# 2/10 3# 2x10 3# 2/10 2# 2/10 2# 2/10 2# 2x10   SL ABD/horizontal 2# 2x10  2# 2/10 3# 2x10 3# 2/10 2# 2/10 2# 2/10 2# 2x10   SL ER 3# 2x10  3# 2/10 3# 2x10 3# 2/10 2# 2/10 2# 2/10 2# 2x10   Ther Activity                 Reaching           [facility] [place]     Carrying                 Lifting                                   Modalities                 CP

## 2021-05-02 NOTE — PROGRESS NOTES
PT Re-Evaluation  and PT Discharge    Today's date: 5/3/2021  Patient name: Umer Francisco  : 1955  MRN: [id number]  Referring provider: Rodriguez Polo*  Dx:   Encounter Diagnosis     ICD-10-CM    1  Impingement syndrome of right shoulder  M75 41      Addendum 2021: The patient contacted our office, stating that his surgeon has discharged him from PT  He will continue with his HEP  Assessment  Assessment details:   CURRENT FUNCTIONAL STATUS    Dressing tolerance: Independent  Bathing/washing hair tolerance: Fair/Good with right arm  Able to reach above overhead with right arm with mild tightness  Able to lift 30 lbs with right arm  Work Status: Able to farm    [facility] (DISCHARGE)    Shoulder AROM: F=150 deg, ABD=160 deg, ER=45 deg, IR BTB=T-L JCT -partially met  Shoulder PROM: F=155 deg, ABD=165 deg, ER=50 deg, IR=60 deg -partially met  Shoulder Strength: F=57 lbs, ABD=53 lbs, ER=25 lbs, IR=43 lbs -partially met   Decrease pain to 0-2/10 -met   Bathing/washing hair tolerance: Good with right arm -partially met   Able to reach overhead with the right arm with minimal tightness -partially met   Able to lift 10 lbs above shoulder level with the right arm -met  Work Status: Able to farm- met    Understanding of Dx/Px/POC: good   Prognosis: good    Goals  See assessment details above  Plan  Plan details: The patient has shown improvement in PT demonstrating decreased pain, increased range of motion, increased strength, and increased tolerance to activity  His AROM and strength measurements have essentially plateaued at this time  He has been placed on hold until his surgical reevaluation on 21  The patient will also continue performing his HEP              Patient would benefit from: skilled physical therapy  Planned modality interventions: unattended electrical stimulation, thermotherapy: hydrocollator packs and cryotherapy  Planned therapy interventions: manual therapy, neuromuscular re-education, therapeutic activities and therapeutic exercise  Frequency: On hold pending surgeon's reevaluation on 21  Subjective Evaluation    History of Present Illness  Mechanism of injury: Subjective The patient's right shoulder pain is mild and intermittent  It is still present when reaching backward  He can reach overhead with some shoulder tightness, and he can lift 30 pounds with the right arm  Pain  Current pain ratin  At best pain ratin  At worst pain ratin    Hand dominance: right    Patient Goals  Patient goals for therapy: decreased pain, increased motion, increased strength and return to work          Objective     General Comments:      Shoulder Comments   CURRENT OBJECTIVE MEASUREMENTS    Shoulder AROM: F=130 deg, ABD=130 deg, ER=30 deg, IR BTB=Sacrum  Shoulder PROM: F=140 deg, ABD=140 deg, ER=35 deg, IR=50 deg    Shoulder Strength: F=59 lbs, ABD=65 lbs, ER=23 lbs, IR=47 lbs                  Precautions: RCR 20     8 Weeks:  21+ TB ER/IR, SL ER to PRE, Prone hz abd thumb up, PNF, stabilization  10 weeks:  21 + kathy, continue to progress PREs         Manuals -22 4/26 4/29 5/3 4/12 4/15   Manual stretch R' S' and E' MB [address] ML ML  RK MB RK   STM R' Biceps/  Lateral arm            MB     Supine 4 way TB                                   Neuro Re-Ed                 Ball Stabilization 3# 2 min  3# 2 min 3# 2x10 3# 2min 3# 2min 3# 2 min 3# 2 min   PNF D1 L3 2x10  L3 2/10 L3 2x10 L3 2/10 L3 2x10 L3 2x10 L3 2x10   PNF D2 L3 2x10  L3 2/10 L3 2x10 L3 2/10 L3 2x10 L3 2x10 L3 2x10   Bodyblade 30" x3 way  30"x 3 way 30"x3 way 30"x3 way 30" 3 way 30" 3 way 30" x3 way    Perturbations 3x20"  3x20" 3 x 20" 3 x 20"  3x20" 3x20"  3x20"   Ther Ex                 Pulleys 30x  30x 30x 30x 30x 30x 30x   Prone horiz abd palm down 3# 2x10  3# 2/10 3# 2x10 3# 2/10 3# 2/10 2# 2/10 3# 2x10   Prone scaption 3# 2x10  3# 2/10 3# 2x10 3# 2/10 3# 2/10 2# 2/10 3# 2x10   TB Mid Row P6 3x10  P6 3x10 3x10 P6 P6 30x P6 30x p6 30x P6 3x10   TB Low Row P5 25x   P5 3x10 3x10 P5 P5 30x P5 25x p5 25x P5 25x   TB Biceps P6 3x20  P6 3x10 3x10 P6 P6 25x P6 25x P5 25x  P5 25x   TB Triceps P6 3x10  P6 3x10 3x10 P6 P6 30x [id number] p6 30 P6 3/10   TB IR P2 3x10  P2 3/10 3x10 P2 P2 3/10 P2 3/10 p1 3x10 P1 3x10   TB ER L4 2x10  L4 2/10 L4 2x10 L4 2/10 L4 2x10 L4 2x10 L4 2x10   IR strap  5x10"  5x10" 10" x5  10"x5 10"x5 10"x5 5x10"   SL Flex 2# 2x10  2# 2/10 3# 2x10 3# 2/10 3# 2/10 2# 2/10 2# 2x10   SL ABD/horizontal 2# 2x10  2# 2/10 3# 2x10 3# 2/10 3# 2/10 2# 2/10 2# 2x10   SL ER 3# 2x10  3# 2/10 3# 2x10 3# 2/10 3# 2/10 2# 2/10 2# 2x10   Ther Activity                 Reaching           [facility] [place]     Carrying                 Lifting                                   Modalities                 CP

## 2021-05-03 ENCOUNTER — TRANSCRIBE ORDERS (OUTPATIENT)
Dept: PHYSICAL THERAPY | Facility: CLINIC | Age: 66
End: 2021-05-03

## 2021-05-03 ENCOUNTER — EVALUATION (OUTPATIENT)
Dept: PHYSICAL THERAPY | Facility: CLINIC | Age: 66
End: 2021-05-03
Payer: MEDICARE

## 2021-05-03 DIAGNOSIS — M75.41 IMPINGEMENT SYNDROME OF RIGHT SHOULDER: Primary | ICD-10-CM

## 2021-05-03 PROCEDURE — 97140 MANUAL THERAPY 1/> REGIONS: CPT | Performed by: PHYSICAL THERAPIST

## 2021-05-03 PROCEDURE — 97112 NEUROMUSCULAR REEDUCATION: CPT | Performed by: PHYSICAL THERAPIST

## 2021-05-03 NOTE — LETTER
May 3, 2021    Kaitlyn Bruce MD  [address] [address]    Patient: Roberto Gabriel   YOB: 1955   Date of Visit: 5/3/2021     Encounter Diagnosis     ICD-10-CM    1  Impingement syndrome of right shoulder  M75 41        Dear Dr Analisa Mann:    Thank you for your recent referral of Roberto Gabriel  Please review the attached evaluation summary from Javy's recent visit  Please verify that you agree with the plan of care by signing the attached order  If you have any questions or concerns, please do not hesitate to call  I sincerely appreciate the opportunity to share in the care of one of your patients and hope to have another opportunity to work with you in the near future  Sincerely,    Abbie Pena, PT      Referring Provider:      I certify that I have read the below Plan of Care and certify the need for these services furnished under this plan of treatment while under my care  Kaitlyn Bruce MD  [address]  Via Fax: [phone]          PT Re-Evaluation     Today's date: 5/3/2021  Patient name: Roberto Gabriel  : 1955  MRN: [id number]  Referring provider: Israel Rosa*  Dx:   Encounter Diagnosis     ICD-10-CM    1  Impingement syndrome of right shoulder  M75 41                   Assessment  Assessment details:   CURRENT FUNCTIONAL STATUS    Dressing tolerance: Independent  Bathing/washing hair tolerance: Fair/Good with right arm  Able to reach above overhead with right arm with mild tightness  Able to lift 30 lbs with right arm      Work Status: Able to farm    [facility] (DISCHARGE)    Shoulder AROM: F=150 deg, ABD=160 deg, ER=45 deg, IR BTB=T-L JCT -partially met  Shoulder PROM: F=155 deg, ABD=165 deg, ER=50 deg, IR=60 deg -partially met  Shoulder Strength: F=57 lbs, ABD=53 lbs, ER=25 lbs, IR=43 lbs -partially met   Decrease pain to 0-2/10 -met   Bathing/washing hair tolerance: Good with right arm -partially met   Able to reach overhead with the right arm with minimal tightness -partially met   Able to lift 10 lbs above shoulder level with the right arm -met  Work Status: Able to farm- met    Understanding of Dx/Px/POC: good   Prognosis: good    Goals  See assessment details above  Plan  Plan details: The patient has shown improvement in PT demonstrating decreased pain, increased range of motion, increased strength, and increased tolerance to activity  His AROM and strength measurements have essentially plateaued at this time  He has been placed on hold until his surgical reevaluation on 21  The patient will also continue performing his HEP            Patient would benefit from: skilled physical therapy  Planned modality interventions: unattended electrical stimulation, thermotherapy: hydrocollator packs and cryotherapy  Planned therapy interventions: manual therapy, neuromuscular re-education, therapeutic activities and therapeutic exercise  Frequency: On hold pending surgeon's reevaluation on 21  Subjective Evaluation    History of Present Illness  Mechanism of injury: Subjective The patient's right shoulder pain is mild and intermittent  It is still present when reaching backward  He can reach overhead with some shoulder tightness, and he can lift 30 pounds with the right arm  Pain  Current pain ratin  At best pain ratin  At worst pain ratin    Hand dominance: right    Patient Goals  Patient goals for therapy: decreased pain, increased motion, increased strength and return to work          Objective     General Comments:      Shoulder Comments   CURRENT OBJECTIVE MEASUREMENTS    Shoulder AROM: F=130 deg, ABD=130 deg, ER=30 deg, IR BTB=Sacrum  Shoulder PROM: F=140 deg, ABD=140 deg, ER=35 deg, IR=50 deg    Shoulder Strength: F=59 lbs, ABD=65 lbs, ER=23 lbs, IR=47 lbs                  Precautions: RCR 20     8 Weeks:  21+ TB ER/IR, SL ER to PRE, Prone hz abd thumb up, PNF, stabilization  10 weeks:  2/8/21 + blackburns, continue to progress PREs         Manuals 4/19 4-22 4/26 4/29 5/3 4/12 4/15   Manual stretch R' S' and E' MB  RK ML ML  RK MB RK   STM R' Biceps/  Lateral arm            MB     Supine 4 way TB                                   Neuro Re-Ed                 Ball Stabilization 3# 2 min  3# 2 min 3# 2x10 3# 2min 3# 2min 3# 2 min 3# 2 min   PNF D1 L3 2x10  L3 2/10 L3 2x10 L3 2/10 L3 2x10 L3 2x10 L3 2x10   PNF D2 L3 2x10  L3 2/10 L3 2x10 L3 2/10 L3 2x10 L3 2x10 L3 2x10   Bodyblade 30" x3 way  30"x 3 way 30"x3 way 30"x3 way 30" 3 way 30" 3 way 30" x3 way    Perturbations 3x20"  3x20" 3 x 20" 3 x 20"  3x20" 3x20"  3x20"   Ther Ex                 Pulleys 30x  30x 30x 30x 30x 30x 30x   Prone horiz abd palm down 3# 2x10  3# 2/10 3# 2x10 3# 2/10 3# 2/10 2# 2/10 3# 2x10   Prone scaption 3# 2x10  3# 2/10 3# 2x10 3# 2/10 3# 2/10 2# 2/10 3# 2x10   TB Mid Row P6 3x10  P6 3x10 3x10 P6 P6 30x P6 30x p6 30x P6 3x10   TB Low Row P5 25x   P5 3x10 3x10 P5 P5 30x P5 25x p5 25x P5 25x   TB Biceps P6 3x20  P6 3x10 3x10 P6 P6 25x P6 25x P5 25x  P5 25x   TB Triceps P6 3x10  P6 3x10 3x10 P6 P6 30x [id number] p6 30 P6 3/10   TB IR P2 3x10  P2 3/10 3x10 P2 P2 3/10 P2 3/10 p1 3x10 P1 3x10   TB ER L4 2x10  L4 2/10 L4 2x10 L4 2/10 L4 2x10 L4 2x10 L4 2x10   IR strap  5x10"  5x10" 10" x5  10"x5 10"x5 10"x5 5x10"   SL Flex 2# 2x10  2# 2/10 3# 2x10 3# 2/10 3# 2/10 2# 2/10 2# 2x10   SL ABD/horizontal 2# 2x10  2# 2/10 3# 2x10 3# 2/10 3# 2/10 2# 2/10 2# 2x10   SL ER 3# 2x10  3# 2/10 3# 2x10 3# 2/10 3# 2/10 2# 2/10 2# 2x10   Ther Activity                 Reaching           [facility] [place]     Carrying                 Lifting                                   Modalities                 CP

## 2021-06-18 DIAGNOSIS — E55.9 VITAMIN D DEFICIENCY: ICD-10-CM

## 2021-06-18 DIAGNOSIS — U07.1 COVID-19: ICD-10-CM

## 2021-06-18 RX ORDER — CHOLECALCIFEROL (VITAMIN D3) 1250 MCG
1 CAPSULE ORAL WEEKLY
Qty: 12 CAPSULE | Refills: 3 | Status: SHIPPED | OUTPATIENT
Start: 2021-06-18

## 2021-09-16 ENCOUNTER — APPOINTMENT (OUTPATIENT)
Dept: LAB | Facility: MEDICAL CENTER | Age: 66
End: 2021-09-16
Payer: MEDICARE

## 2021-09-16 DIAGNOSIS — I10 BENIGN ESSENTIAL HYPERTENSION: ICD-10-CM

## 2021-09-16 DIAGNOSIS — E55.9 VITAMIN D DEFICIENCY: ICD-10-CM

## 2021-09-16 DIAGNOSIS — E78.2 MIXED HYPERLIPIDEMIA: ICD-10-CM

## 2021-09-16 DIAGNOSIS — I25.10 ATHEROSCLEROSIS OF NATIVE CORONARY ARTERY OF NATIVE HEART WITHOUT ANGINA PECTORIS: ICD-10-CM

## 2021-09-16 DIAGNOSIS — R73.01 IMPAIRED FASTING BLOOD SUGAR: ICD-10-CM

## 2021-09-16 DIAGNOSIS — M47.817 LUMBOSACRAL SPONDYLOSIS WITHOUT MYELOPATHY: ICD-10-CM

## 2021-09-16 LAB
ALBUMIN SERPL BCP-MCNC: 3.2 G/DL (ref 3.5–5)
ALP SERPL-CCNC: 44 U/L (ref 46–116)
ALT SERPL W P-5'-P-CCNC: 29 U/L (ref 12–78)
ANION GAP SERPL CALCULATED.3IONS-SCNC: 6 MMOL/L (ref 4–13)
AST SERPL W P-5'-P-CCNC: 39 U/L (ref 5–45)
BASOPHILS # BLD AUTO: 0.02 THOUSANDS/ΜL (ref 0–0.1)
BASOPHILS NFR BLD AUTO: 0 % (ref 0–1)
BILIRUB SERPL-MCNC: 0.8 MG/DL (ref 0.2–1)
BUN SERPL-MCNC: 16 MG/DL (ref 5–25)
CALCIUM ALBUM COR SERPL-MCNC: 8.9 MG/DL (ref 8.3–10.1)
CALCIUM SERPL-MCNC: 8.3 MG/DL (ref 8.3–10.1)
CHLORIDE SERPL-SCNC: 109 MMOL/L (ref 100–108)
CHOLEST SERPL-MCNC: 111 MG/DL (ref 50–200)
CO2 SERPL-SCNC: 24 MMOL/L (ref 21–32)
CREAT SERPL-MCNC: 1.12 MG/DL (ref 0.6–1.3)
EOSINOPHIL # BLD AUTO: 0.34 THOUSAND/ΜL (ref 0–0.61)
EOSINOPHIL NFR BLD AUTO: 6 % (ref 0–6)
ERYTHROCYTE [DISTWIDTH] IN BLOOD BY AUTOMATED COUNT: 13.2 % (ref 11.6–15.1)
EST. AVERAGE GLUCOSE BLD GHB EST-MCNC: 126 MG/DL
GFR SERPL CREATININE-BSD FRML MDRD: 68 ML/MIN/1.73SQ M
GLUCOSE P FAST SERPL-MCNC: 112 MG/DL (ref 65–99)
HBA1C MFR BLD: 6 %
HCT VFR BLD AUTO: 47.1 % (ref 36.5–49.3)
HDLC SERPL-MCNC: 36 MG/DL
HGB BLD-MCNC: 15.4 G/DL (ref 12–17)
IMM GRANULOCYTES # BLD AUTO: 0.01 THOUSAND/UL (ref 0–0.2)
IMM GRANULOCYTES NFR BLD AUTO: 0 % (ref 0–2)
LDLC SERPL CALC-MCNC: 50 MG/DL (ref 0–100)
LYMPHOCYTES # BLD AUTO: 2.59 THOUSANDS/ΜL (ref 0.6–4.47)
LYMPHOCYTES NFR BLD AUTO: 46 % (ref 14–44)
MCH RBC QN AUTO: 30 PG (ref 26.8–34.3)
MCHC RBC AUTO-ENTMCNC: 32.7 G/DL (ref 31.4–37.4)
MCV RBC AUTO: 92 FL (ref 82–98)
MONOCYTES # BLD AUTO: 0.68 THOUSAND/ΜL (ref 0.17–1.22)
MONOCYTES NFR BLD AUTO: 12 % (ref 4–12)
NEUTROPHILS # BLD AUTO: 2.02 THOUSANDS/ΜL (ref 1.85–7.62)
NEUTS SEG NFR BLD AUTO: 36 % (ref 43–75)
NONHDLC SERPL-MCNC: 75 MG/DL
NRBC BLD AUTO-RTO: 0 /100 WBCS
PLATELET # BLD AUTO: 219 THOUSANDS/UL (ref 149–390)
PMV BLD AUTO: 12.2 FL (ref 8.9–12.7)
POTASSIUM SERPL-SCNC: 4.7 MMOL/L (ref 3.5–5.3)
PROT SERPL-MCNC: 6.4 G/DL (ref 6.4–8.2)
RBC # BLD AUTO: 5.13 MILLION/UL (ref 3.88–5.62)
SODIUM SERPL-SCNC: 139 MMOL/L (ref 136–145)
TRIGL SERPL-MCNC: 126 MG/DL
TSH SERPL DL<=0.05 MIU/L-ACNC: 3.38 UIU/ML (ref 0.36–3.74)
WBC # BLD AUTO: 5.66 THOUSAND/UL (ref 4.31–10.16)

## 2021-09-16 PROCEDURE — 84443 ASSAY THYROID STIM HORMONE: CPT

## 2021-09-16 PROCEDURE — 85025 COMPLETE CBC W/AUTO DIFF WBC: CPT

## 2021-09-16 PROCEDURE — 36415 COLL VENOUS BLD VENIPUNCTURE: CPT

## 2021-09-16 PROCEDURE — 83036 HEMOGLOBIN GLYCOSYLATED A1C: CPT

## 2021-09-16 PROCEDURE — 80053 COMPREHEN METABOLIC PANEL: CPT

## 2021-09-16 PROCEDURE — 80061 LIPID PANEL: CPT

## 2021-09-20 ENCOUNTER — OFFICE VISIT (OUTPATIENT)
Dept: INTERNAL MEDICINE CLINIC | Facility: CLINIC | Age: 66
End: 2021-09-20
Payer: MEDICARE

## 2021-09-20 VITALS
TEMPERATURE: 96.9 F | HEART RATE: 69 BPM | WEIGHT: 265 LBS | SYSTOLIC BLOOD PRESSURE: 126 MMHG | BODY MASS INDEX: 40.16 KG/M2 | DIASTOLIC BLOOD PRESSURE: 78 MMHG | HEIGHT: 68 IN | OXYGEN SATURATION: 97 %

## 2021-09-20 DIAGNOSIS — R73.03 PREDIABETES: ICD-10-CM

## 2021-09-20 DIAGNOSIS — I10 BENIGN ESSENTIAL HYPERTENSION: ICD-10-CM

## 2021-09-20 DIAGNOSIS — E78.2 MIXED HYPERLIPIDEMIA: ICD-10-CM

## 2021-09-20 DIAGNOSIS — I25.119 ATHEROSCLEROSIS OF CORONARY ARTERY WITH ANGINA PECTORIS, UNSPECIFIED VESSEL OR LESION TYPE, UNSPECIFIED WHETHER NATIVE OR TRANSPLANTED HEART (HCC): Primary | ICD-10-CM

## 2021-09-20 DIAGNOSIS — I25.10 ATHEROSCLEROSIS OF NATIVE CORONARY ARTERY OF NATIVE HEART WITHOUT ANGINA PECTORIS: ICD-10-CM

## 2021-09-20 DIAGNOSIS — F41.9 ANXIETY: ICD-10-CM

## 2021-09-20 DIAGNOSIS — M47.817 LUMBOSACRAL SPONDYLOSIS WITHOUT MYELOPATHY: ICD-10-CM

## 2021-09-20 PROBLEM — J40 BRONCHITIS: Status: RESOLVED | Noted: 2020-03-19 | Resolved: 2021-09-20

## 2021-09-20 PROCEDURE — 99213 OFFICE O/P EST LOW 20 MIN: CPT | Performed by: NURSE PRACTITIONER

## 2021-09-20 NOTE — PROGRESS NOTES
Assessment/Plan:Patient is following up with Cardiology and did have recent stress test done which was normal  He did see them in December and is now taking Zetia   He is taking Crestor 20 mg and can not tolerate 40 mg  Last lab work did show his LDL is at 50and triglycerides 126  Vitamin D still low at 52  He is deferring vaccines at this time  /78  Recent colonoscopy did show internal hemorrhoids with no polyps  Other screenings are up to date  Labs reviewed with patient and normal  Patient is deferring to follow up with Pulmonary at this time and is deferring a sleep study  Will repeat fasting labs as well  Continue to follow up with [facility]  A1C is at 6 did advise to continue to watch diet and will recheck  Will bring back for flu vaccine  Will follow up in 6 month or sooner if need be  No problem-specific Assessment & Plan notes found for this encounter  Problem List Items Addressed This Visit        Cardiovascular and Mediastinum    Atherosclerotic heart disease of native coronary artery without angina pectoris    Coronary atherosclerosis - Primary    Benign essential hypertension       Musculoskeletal and Integument    Lumbosacral spondylosis without myelopathy       Other    Anxiety    Hyperlipidemia    Prediabetes    Relevant Orders    HEMOGLOBIN A1C W/ EAG ESTIMATION            Subjective:      Patient ID: Cookie Barr is a 78 y o  male  Caleb Cuong is for a follow up visit  He is doing well and did have the first covid vaccine  He states he did have some chills and did not feel well but that did pass within 24 hours  He is waiting to get his second vaccine  He would like the flu vaccine  He denies any chest pain, SOB, or palpitations  He has not seen Cardiology  He is having some issues with his stomach on and off but does take reflux medications and it does stop  He did have his labs done  He offers no other issues        The following portions of the patient's history were reviewed and updated as appropriate: He  has a past medical history of Cardiac disease, Hypertension, and Myocardial infarction ([facility] [address] )  He   Patient Active Problem List    Diagnosis Date Noted    Prediabetes 09/20/2021    COVID-19 12/16/2020    Impingement syndrome of right shoulder 11/06/2020    BMI 38 0-38 9,adult 03/19/2020    Right wrist pain 09/20/2019    Colon cancer screening 03/07/2019    Acquired scoliosis 03/05/2019    Carotid artery occlusion 03/05/2019    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 03/05/2019    Lumbosacral spondylosis without myelopathy 03/05/2019    Old intraocular magnetic foreign body 03/05/2019    Overweight 09/04/2018    Pure hypercholesterolemia 09/04/2018    S/P coronary artery stent placement 09/04/2018    Joint inflammation 05/30/2017    Localized primary osteoarthritis of wrist 02/01/2017    Vitamin D deficiency 03/06/2015    Anxiety 05/21/2013    Atherosclerotic heart disease of native coronary artery without angina pectoris 11/29/2012    Hyperlipidemia 11/29/2012    Coronary atherosclerosis 11/17/2010    Benign essential hypertension 11/17/2010    Osteoarthritis of hip 65/11/1783    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[facility] [address] ) 11/11/2008     He  has a past surgical history that includes Coronary stent placement; Total hip arthroplasty; Hernia repair; Other surgical history (09/18/2010); Tonsillectomy and adenoidectomy; and pr colonoscopy flx dx w/collj spec when pfrmd (N/A, 3/27/2019)  His family history includes Atrial fibrillation in his father; Breast cancer in his mother; Coronary artery disease in his mother; Diabetes in his mother; Hyperlipidemia in his mother  He  reports that he has never smoked  He has never used smokeless tobacco  He reports that he does not drink alcohol and does not use drugs  Current Outpatient Medications   Medication Sig Dispense Refill    aspirin 81 MG tablet Take 81 mg by mouth daily        cholecalciferol (VITAMIN D3) 1,000 units tablet Take 2 tablets (2,000 Units total) by mouth daily for 15 days 30 tablet 0    Cholecalciferol (Vitamin D3) 1 25 MG (52544 UT) CAPS Take 1 capsule (50,000 Units total) by mouth once a week 12 capsule 3    cyclobenzaprine (FLEXERIL) 10 mg tablet Take 1 tablet (10 mg total) by mouth 3 (three) times a day as needed for muscle spasms 60 tablet 0    ezetimibe (ZETIA) 10 mg tablet Take 1 tablet (10 mg total) by mouth daily 90 tablet 3    Garlic Oil 1869 MG CAPS Take 2 capsules by mouth daily      nitroglycerin (NITROSTAT) 0 4 mg SL tablet Place 1 tablet (0 4 mg total) under the tongue every 5 (five) minutes as needed for chest pain 25 tablet 3    rosuvastatin (CRESTOR) 20 MG tablet Take 1 tablet (20 mg total) by mouth daily 90 tablet 3    albuterol (VENTOLIN HFA) 90 mcg/act inhaler Inhale 2 puffs every 6 (six) hours as needed for wheezing (Patient not taking: Reported on 9/20/2021) 1 Inhaler 5    Ascorbic Acid (vitamin C) 1000 MG tablet Take 1 tablet (1,000 mg total) by mouth 2 (two) times a day for 15 days (Patient not taking: Reported on 9/20/2021) 30 tablet 0    methylPREDNISolone 4 MG tablet therapy pack Use as directed on package (Patient not taking: Reported on 2/23/2021) 21 each 0    omeprazole (PriLOSEC) 20 mg delayed release capsule Take 1 capsule (20 mg total) by mouth daily before breakfast (Patient not taking: Reported on 2/23/2021) 90 capsule 3     No current facility-administered medications for this visit  Current Outpatient Medications on File Prior to Visit   Medication Sig    aspirin 81 MG tablet Take 81 mg by mouth daily      cholecalciferol (VITAMIN D3) 1,000 units tablet Take 2 tablets (2,000 Units total) by mouth daily for 15 days    Cholecalciferol (Vitamin D3) 1 25 MG (56185 UT) CAPS Take 1 capsule (50,000 Units total) by mouth once a week    cyclobenzaprine (FLEXERIL) 10 mg tablet Take 1 tablet (10 mg total) by mouth 3 (three) times a day as needed for muscle spasms    ezetimibe (ZETIA) 10 mg tablet Take 1 tablet (10 mg total) by mouth daily    Garlic Oil 4515 MG CAPS Take 2 capsules by mouth daily    nitroglycerin (NITROSTAT) 0 4 mg SL tablet Place 1 tablet (0 4 mg total) under the tongue every 5 (five) minutes as needed for chest pain    rosuvastatin (CRESTOR) 20 MG tablet Take 1 tablet (20 mg total) by mouth daily    albuterol (VENTOLIN HFA) 90 mcg/act inhaler Inhale 2 puffs every 6 (six) hours as needed for wheezing (Patient not taking: Reported on 9/20/2021)    Ascorbic Acid (vitamin C) 1000 MG tablet Take 1 tablet (1,000 mg total) by mouth 2 (two) times a day for 15 days (Patient not taking: Reported on 9/20/2021)    methylPREDNISolone 4 MG tablet therapy pack Use as directed on package (Patient not taking: Reported on 2/23/2021)    omeprazole (PriLOSEC) 20 mg delayed release capsule Take 1 capsule (20 mg total) by mouth daily before breakfast (Patient not taking: Reported on 2/23/2021)     No current facility-administered medications on file prior to visit  He is allergic to erythromycin base, other, oxycodone-acetaminophen, percocet [oxycodone-acetaminophen], and penicillins       Review of Systems   All other systems reviewed and are negative  Below is the patient's most recent value for Albumin, ALT, AST, BUN, Calcium, Chloride, Cholesterol, CO2, Creatinine, GFR, Glucose, HDL, Hematocrit, Hemoglobin, Hemoglobin A1C, LDL, Magnesium, Phosphorus, Platelets, Potassium, PSA, Sodium, Triglycerides, and WBC     Lab Results   Component Value Date    ALT 29 09/16/2021    AST 39 09/16/2021    BUN 16 09/16/2021    CALCIUM 8 3 09/16/2021     (H) 09/16/2021    CHOL 173 03/26/2015    CO2 24 09/16/2021    CREATININE 1 12 09/16/2021    HDL 36 (L) 09/16/2021    HCT 47 1 09/16/2021    HGB 15 4 09/16/2021    HGBA1C 6 0 (H) 09/16/2021    MG 2 3 03/19/2018     09/16/2021    K 4 7 09/16/2021    PSA 0 6 03/19/2018     (L) 04/02/2015    TRIG 126 09/16/2021    WBC 5 66 09/16/2021     Note: for a comprehensive list of the patient's lab results, access the Results Review activity  Objective:      /78 (BP Location: Left arm, Patient Position: Sitting, Cuff Size: Large)   Pulse 69   Temp (!) 96 9 °F (36 1 °C) (Temporal)   Ht 5' 8" (1 727 m)   Wt 120 kg (265 lb)   SpO2 97%   BMI 40 29 kg/m²          Physical Exam  Vitals reviewed  Constitutional:       Appearance: Normal appearance  He is obese  HENT:      Head: Normocephalic and atraumatic  Right Ear: Tympanic membrane, ear canal and external ear normal       Left Ear: Tympanic membrane, ear canal and external ear normal       Nose: Nose normal       Mouth/Throat:      Mouth: Mucous membranes are moist    Eyes:      Extraocular Movements: Extraocular movements intact  Conjunctiva/sclera: Conjunctivae normal       Pupils: Pupils are equal, round, and reactive to light  Cardiovascular:      Rate and Rhythm: Normal rate and regular rhythm  Pulses: Normal pulses  Heart sounds: Normal heart sounds  Pulmonary:      Effort: Pulmonary effort is normal       Breath sounds: Normal breath sounds  Abdominal:      General: Abdomen is flat  Bowel sounds are normal       Palpations: Abdomen is soft  Musculoskeletal:         General: Normal range of motion  Cervical back: Normal range of motion and neck supple  Skin:     General: Skin is warm and dry  Capillary Refill: Capillary refill takes less than 2 seconds  Neurological:      General: No focal deficit present  Mental Status: He is alert and oriented to person, place, and time  Mental status is at baseline  Psychiatric:         Mood and Affect: Mood normal          Behavior: Behavior normal          Thought Content:  Thought content normal          Judgment: Judgment normal

## 2022-01-14 ENCOUNTER — APPOINTMENT (OUTPATIENT)
Dept: LAB | Facility: MEDICAL CENTER | Age: 67
End: 2022-01-14
Payer: MEDICARE

## 2022-01-14 DIAGNOSIS — R73.03 PREDIABETES: ICD-10-CM

## 2022-01-14 LAB
EST. AVERAGE GLUCOSE BLD GHB EST-MCNC: 134 MG/DL
HBA1C MFR BLD: 6.3 %

## 2022-01-14 PROCEDURE — 36415 COLL VENOUS BLD VENIPUNCTURE: CPT

## 2022-01-14 PROCEDURE — 83036 HEMOGLOBIN GLYCOSYLATED A1C: CPT

## 2022-02-02 ENCOUNTER — OFFICE VISIT (OUTPATIENT)
Dept: CARDIOLOGY CLINIC | Facility: HOSPITAL | Age: 67
End: 2022-02-02
Payer: MEDICARE

## 2022-02-02 VITALS
BODY MASS INDEX: 41.22 KG/M2 | HEIGHT: 68 IN | DIASTOLIC BLOOD PRESSURE: 90 MMHG | HEART RATE: 68 BPM | WEIGHT: 272 LBS | SYSTOLIC BLOOD PRESSURE: 168 MMHG

## 2022-02-02 DIAGNOSIS — Z95.5 S/P CORONARY ARTERY STENT PLACEMENT: ICD-10-CM

## 2022-02-02 DIAGNOSIS — I10 BENIGN ESSENTIAL HYPERTENSION: ICD-10-CM

## 2022-02-02 DIAGNOSIS — I25.119 ATHEROSCLEROSIS OF CORONARY ARTERY WITH ANGINA PECTORIS, UNSPECIFIED VESSEL OR LESION TYPE, UNSPECIFIED WHETHER NATIVE OR TRANSPLANTED HEART (HCC): ICD-10-CM

## 2022-02-02 DIAGNOSIS — R06.00 DOE (DYSPNEA ON EXERTION): ICD-10-CM

## 2022-02-02 DIAGNOSIS — I25.10 ATHEROSCLEROSIS OF NATIVE CORONARY ARTERY OF NATIVE HEART WITHOUT ANGINA PECTORIS: Primary | ICD-10-CM

## 2022-02-02 DIAGNOSIS — E66.01 MORBID OBESITY WITH BMI OF 40.0-44.9, ADULT (HCC): ICD-10-CM

## 2022-02-02 DIAGNOSIS — I65.29 OCCLUSION OF CAROTID ARTERY, UNSPECIFIED LATERALITY: ICD-10-CM

## 2022-02-02 DIAGNOSIS — E78.2 MIXED HYPERLIPIDEMIA: ICD-10-CM

## 2022-02-02 DIAGNOSIS — G47.19 DAYTIME HYPERSOMNOLENCE: ICD-10-CM

## 2022-02-02 PROCEDURE — 99204 OFFICE O/P NEW MOD 45 MIN: CPT | Performed by: INTERNAL MEDICINE

## 2022-02-02 PROCEDURE — 93000 ELECTROCARDIOGRAM COMPLETE: CPT | Performed by: INTERNAL MEDICINE

## 2022-02-02 NOTE — PROGRESS NOTES
Cardiology Follow Up    Katia Barrientos [facility] [place]  1955  [id number]  [facility] [place]  [place] [address]  [phone]    1  Atherosclerosis of native coronary artery of native heart without angina pectoris  POCT ECG    VAS screening    Home Study    Echo complete w/ contrast if indicated    NM myocardial perfusion spect (rx stress and/or rest)   2  Benign essential hypertension  POCT ECG    VAS screening    Home Study    Echo complete w/ contrast if indicated    NM myocardial perfusion spect (rx stress and/or rest)   3  Occlusion of carotid artery, unspecified laterality     4  Atherosclerosis of coronary artery with angina pectoris, unspecified vessel or lesion type, unspecified whether native or transplanted heart ([place] [address] )  POCT ECG    VAS screening    Home Study    Echo complete w/ contrast if indicated    NM myocardial perfusion spect (rx stress and/or rest)   5  Mixed hyperlipidemia  POCT ECG    VAS screening    Home Study    Echo complete w/ contrast if indicated    NM myocardial perfusion spect (rx stress and/or rest)   6  S/P coronary artery stent placement  POCT ECG    VAS screening    Home Study    Echo complete w/ contrast if indicated    NM myocardial perfusion spect (rx stress and/or rest)   7  GARCIA (dyspnea on exertion)  POCT ECG    VAS screening    Home Study    Echo complete w/ contrast if indicated    NM myocardial perfusion spect (rx stress and/or rest)   8  Daytime hypersomnolence  POCT ECG    VAS screening    Home Study    Echo complete w/ contrast if indicated    NM myocardial perfusion spect (rx stress and/or rest)   9  Morbid obesity with BMI of 40 0-44 9, adult ([facility] )         Discussion/Summary:  1  Coronary artery disease with PCI of LAD in 2010, moderate residual circ and RCA disease  2  Hypertension  3  Hyperlipidemia  4  Probable obstructive sleep apnea  5  Morbid obesity  6  Peripheral arterial disease  7  Dyspnea on exertion    Recommendations:  Patient has a known history of coronary artery disease dating back to 2010 with moderate residual disease  He has shortness of breath with activity and is unable to walk on a treadmill I recommended a Lexiscan Myoview to rule out ischemia in those other vascular territories  He needs an echocardiogram to evaluate LV function and estimate diastolic parameters and pulmonary artery pressure  He has increased daytime somnolence and witnessed snoring and apneas I have ordered home sleep study  I have also ordered a vascular screening due to history of peripheral arterial disease and possible carotid disease  Blood pressure at home has been well controlled will continue to watch closely  Lipids are at goal on Crestor and ezetimibe  Interval History:  Very pleasant 60-year-old gentleman with a history of coronary artery disease as described above, hypertension, hyperlipidemia, obesity presents for new patient consultation on behalf of [employer] for management of coronary artery disease  Over the past year he has had some shortness of breath with physical activity and walking up an incline  He gets some occasional chest pain it which is located over left anterior chest wall he has been told this is musculoskeletal in the past   He has not had any cardiac workup recently  He denies any lower extremity edema, PND, orthopnea  He does not sleep well has increased daytime somnolence  His wife says he has witnessed apnea and snoring episodes  Diet has been low in sodium  Lifetime nonsmoker      Medical Problems             Problem List     Anxiety    Atherosclerotic heart disease of native coronary artery without angina pectoris    Coronary atherosclerosis    Benign essential hypertension    Hyperlipidemia    Joint inflammation    Vitamin D deficiency    Acquired scoliosis    Carotid artery occlusion    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    Localized primary osteoarthritis of wrist    Lumbosacral spondylosis without myelopathy    Old intraocular magnetic foreign body    Osteoarthritis of hip    Overweight    Pure hypercholesterolemia    S/P coronary artery stent placement    Overview Signed 3/5/2019  7:41 AM by YUNI Warner     Last Assessment & Plan:   CADEN 2010 ?  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[facility] [address] )    Colon cancer screening    Overview Signed 3/7/2019 11:54 AM by Gayle Roe     Added automatically from request for surgery [id number]         Right wrist pain    BMI 38 0-38 9,adult    Impingement syndrome of right shoulder    COVID-19    Prediabetes    Daytime hypersomnolence    Morbid obesity with BMI of 40 0-44 9, adult ([facility] [address] )              Past Medical History:   Diagnosis Date    Cardiac disease     Hypertension     Myocardial infarction [facility])      Social History     Socioeconomic History    Marital status: /Civil Union     Spouse name: Not on file    Number of children: Not on file    Years of education: Not on file    Highest education level: Not on file   Occupational History    Not on file   Tobacco Use    Smoking status: Never Smoker    Smokeless tobacco: Never Used   Vaping Use    Vaping Use: Never used   Substance and Sexual Activity    Alcohol use: No    Drug use: No    Sexual activity: Not on file   Other Topics Concern    Not on file   Social History Narrative    Dental care, regularly    Good dental hygiene    No caffeine use    Sun protection sunscreen     Social Determinants of Health     Financial Resource Strain: Not on file   Food Insecurity: Not on file   Transportation Needs: Not on file   Physical Activity: Not on file   Stress: Not on file   Social Connections: Not on file   Intimate Partner Violence: Not on file   Housing Stability: Not on file      Family History   Problem Relation Age of Onset    Breast cancer Mother     Coronary artery disease Mother     Diabetes Mother     Hyperlipidemia Mother     Atrial fibrillation Father      Past Surgical History:   Procedure Laterality Date    CORONARY STENT PLACEMENT      HERNIA REPAIR      OTHER SURGICAL HISTORY  09/18/2010    Arterial catheterization    ND COLONOSCOPY FLX DX W/COLLJ SPEC WHEN PFRMD N/A 3/27/2019    Procedure: COLONOSCOPY;  Surgeon: Valerio Salas MD;  Location: [facility];  Service: Gastroenterology    TONSILLECTOMY AND ADENOIDECTOMY      TOTAL HIP ARTHROPLASTY         Current Outpatient Medications:     aspirin 81 MG tablet, Take 81 mg by mouth daily  , Disp: , Rfl:     Cholecalciferol (Vitamin D3) 1 25 MG (32301 UT) CAPS, Take 1 capsule (50,000 Units total) by mouth once a week, Disp: 12 capsule, Rfl: 3    ezetimibe (ZETIA) 10 mg tablet, Take 1 tablet (10 mg total) by mouth daily, Disp: 90 tablet, Rfl: 3    Garlic Oil 2883 MG CAPS, Take 2 capsules by mouth daily, Disp: , Rfl:     nitroglycerin (NITROSTAT) 0 4 mg SL tablet, Place 1 tablet (0 4 mg total) under the tongue every 5 (five) minutes as needed for chest pain, Disp: 25 tablet, Rfl: 3    rosuvastatin (CRESTOR) 20 MG tablet, Take 1 tablet (20 mg total) by mouth daily, Disp: 90 tablet, Rfl: 3  Allergies   Allergen Reactions    Erythromycin Base Diarrhea    Other Other (See Comments)     HIGH DOSE ASPIRINS    Nose bleeds    Oxycodone-Acetaminophen Other (See Comments)     hallucinations    Percocet [Oxycodone-Acetaminophen] Other (See Comments)     hallucinations    Penicillins Hives and Rash       Labs:     Chemistry        Component Value Date/Time     (L) 04/02/2015 1435    K 4 7 09/16/2021 0723    K 3 8 04/02/2015 1435     (H) 09/16/2021 0723    CL 98 04/02/2015 1435    CO2 24 09/16/2021 0723    CO2 26 1 04/02/2015 1435    BUN 16 09/16/2021 0723    BUN 15 04/02/2015 1435    CREATININE 1 12 09/16/2021 0723    CREATININE 1 06 04/02/2015 1435        Component Value Date/Time    CALCIUM 8 3 09/16/2021 0723    CALCIUM 8 1 (L) 04/02/2015 1435    ALKPHOS 44 (L) 09/16/2021 0723    ALKPHOS 50 04/02/2015 1435    AST 39 09/16/2021 0723    AST 37 04/02/2015 1435    ALT 29 09/16/2021 0723    ALT 45 04/02/2015 1435    BILITOT 0 5 04/02/2015 1435            Lab Results   Component Value Date    CHOL 173 03/26/2015    CHOL 199 08/11/2014     Lab Results   Component Value Date    HDL 36 (L) 09/16/2021    HDL 41 02/17/2021    HDL 51 08/17/2020     Lab Results   Component Value Date    LDLCALC 50 09/16/2021    LDLCALC 56 02/17/2021    LDLCALC 77 08/17/2020     Lab Results   Component Value Date    TRIG 126 09/16/2021    TRIG 160 (H) 02/17/2021    TRIG 128 08/17/2020     No results found for: CHOLHDL    Imaging: No results found  ECG:  Normal sinus rhythm      Review of Systems   Constitutional: Negative  HENT: Negative  Eyes: Negative  Cardiovascular: Positive for chest pain and dyspnea on exertion  Negative for leg swelling, near-syncope, orthopnea, palpitations, paroxysmal nocturnal dyspnea and syncope  Respiratory: Negative  Endocrine: Negative  Hematologic/Lymphatic: Negative  Skin: Negative  Musculoskeletal: Negative  Gastrointestinal: Negative  Genitourinary: Negative  Neurological: Negative  Psychiatric/Behavioral: Negative  All other systems reviewed and are negative  Vitals:    02/02/22 1302   BP: 168/90   Pulse: 68     Vitals:    02/02/22 1302   Weight: 123 kg (272 lb)     Height: 5' 8" (172 7 cm)   Body mass index is 41 36 kg/m²  Physical Exam:  Vital signs reviewed  General:  Alert and cooperative, appears stated age, no acute distress  HEENT:  PERRLA, EOMI, no scleral icterus, no conjunctival pallor  Neck:  No lymphadenopathy, no thyromegaly, no carotid bruits, no elevated JVP  Heart:  Regular rate and rhythm, normal S1/S2, no S3/S4, no murmur, rubs or gallops    PMI nondisplaced  Lungs:  Clear to auscultation bilaterally, no wheezes rales or rhonchi  Abdomen:  Soft, non-tender, positive bowel sounds, no rebound or guarding,   no organomegaly   Extremities:  Normal range of motion    No clubbing, cyanosis or edema   Vascular:  2+ pedal pulses  Skin:  No rashes or lesions on exposed skin  Neurologic:  Cranial nerves II-XII grossly intact without focal deficits

## 2022-02-09 ENCOUNTER — HOSPITAL ENCOUNTER (OUTPATIENT)
Dept: NON INVASIVE DIAGNOSTICS | Facility: HOSPITAL | Age: 67
Discharge: HOME | End: 2022-02-09
Attending: INTERNAL MEDICINE
Payer: COMMERCIAL

## 2022-02-09 DIAGNOSIS — I10 BENIGN ESSENTIAL HYPERTENSION: ICD-10-CM

## 2022-02-09 DIAGNOSIS — E78.2 MIXED HYPERLIPIDEMIA: ICD-10-CM

## 2022-02-09 DIAGNOSIS — G47.19 DAYTIME HYPERSOMNOLENCE: ICD-10-CM

## 2022-02-09 DIAGNOSIS — Z95.5 S/P CORONARY ARTERY STENT PLACEMENT: ICD-10-CM

## 2022-02-09 DIAGNOSIS — I25.119 ATHEROSCLEROSIS OF CORONARY ARTERY WITH ANGINA PECTORIS, UNSPECIFIED VESSEL OR LESION TYPE, UNSPECIFIED WHETHER NATIVE OR TRANSPLANTED HEART (HCC): ICD-10-CM

## 2022-02-09 DIAGNOSIS — I25.10 ATHEROSCLEROSIS OF NATIVE CORONARY ARTERY OF NATIVE HEART WITHOUT ANGINA PECTORIS: ICD-10-CM

## 2022-02-09 DIAGNOSIS — R06.00 DOE (DYSPNEA ON EXERTION): ICD-10-CM

## 2022-02-09 PROCEDURE — 93880 EXTRACRANIAL BILAT STUDY: CPT | Performed by: RADIOLOGY

## 2022-02-09 PROCEDURE — 93978 VASCULAR STUDY: CPT | Performed by: RADIOLOGY

## 2022-02-09 PROCEDURE — 93922 UPR/L XTREMITY ART 2 LEVELS: CPT | Performed by: RADIOLOGY

## 2022-02-09 PROCEDURE — 93922 UPR/L XTREMITY ART 2 LEVELS: CPT

## 2022-02-11 ENCOUNTER — TELEPHONE (OUTPATIENT)
Dept: SLEEP CENTER | Facility: CLINIC | Age: 67
End: 2022-02-11

## 2022-02-11 NOTE — TELEPHONE ENCOUNTER
----- Message from Nuzhat Viera DO sent at 2/8/2022  8:44 PM EST -----  approved  ----- Message -----  From: Michelle Felipe  Sent: 2/3/2022  11:23 AM EST  To: Sleep Medicine [facility] Provider    This sleep study needs approval      If approved please sign and return to clerical pool  If denied please include reasons why  Also provide alternative testing if warranted  Please sign and return to clerical pool

## 2022-02-11 NOTE — TELEPHONE ENCOUNTER
----- Message from Steven Vogel DO sent at 2/8/2022  8:44 PM EST -----  approved  ----- Message -----  From: Parag Acosta  Sent: 2/3/2022  11:23 AM EST  To: Sleep Medicine [facility] Provider    This sleep study needs approval      If approved please sign and return to clerical pool  If denied please include reasons why  Also provide alternative testing if warranted  Please sign and return to clerical pool

## 2022-02-17 ENCOUNTER — HOSPITAL ENCOUNTER (OUTPATIENT)
Dept: NUCLEAR MEDICINE | Facility: HOSPITAL | Age: 67
Discharge: HOME | End: 2022-02-17
Attending: INTERNAL MEDICINE
Payer: MEDICARE

## 2022-02-17 ENCOUNTER — HOSPITAL ENCOUNTER (OUTPATIENT)
Dept: NON INVASIVE DIAGNOSTICS | Facility: HOSPITAL | Age: 67
Discharge: HOME | End: 2022-02-17
Attending: INTERNAL MEDICINE
Payer: MEDICARE

## 2022-02-17 VITALS
HEIGHT: 68 IN | BODY MASS INDEX: 41.22 KG/M2 | DIASTOLIC BLOOD PRESSURE: 90 MMHG | HEART RATE: 80 BPM | WEIGHT: 272 LBS | SYSTOLIC BLOOD PRESSURE: 168 MMHG

## 2022-02-17 DIAGNOSIS — I10 BENIGN ESSENTIAL HYPERTENSION: ICD-10-CM

## 2022-02-17 DIAGNOSIS — I25.119 ATHEROSCLEROSIS OF CORONARY ARTERY WITH ANGINA PECTORIS, UNSPECIFIED VESSEL OR LESION TYPE, UNSPECIFIED WHETHER NATIVE OR TRANSPLANTED HEART (HCC): ICD-10-CM

## 2022-02-17 DIAGNOSIS — G47.19 DAYTIME HYPERSOMNOLENCE: ICD-10-CM

## 2022-02-17 DIAGNOSIS — I25.10 ATHEROSCLEROSIS OF NATIVE CORONARY ARTERY OF NATIVE HEART WITHOUT ANGINA PECTORIS: ICD-10-CM

## 2022-02-17 DIAGNOSIS — E78.2 MIXED HYPERLIPIDEMIA: ICD-10-CM

## 2022-02-17 DIAGNOSIS — R06.00 DOE (DYSPNEA ON EXERTION): ICD-10-CM

## 2022-02-17 DIAGNOSIS — Z95.5 S/P CORONARY ARTERY STENT PLACEMENT: ICD-10-CM

## 2022-02-17 LAB
AORTIC ROOT: 3 CM
APICAL FOUR CHAMBER EJECTION FRACTION: 72 %
E WAVE DECELERATION TIME: 262 MS
FRACTIONAL SHORTENING: 45 % (ref 28–44)
INTERVENTRICULAR SEPTUM IN DIASTOLE (PARASTERNAL SHORT AXIS VIEW): 1.5 CM (ref 0.58–1.09)
INTERVENTRICULAR SEPTUM: 1.5 CM (ref 0.6–1.1)
LEFT ATRIUM AREA SYSTOLE SINGLE PLANE A4C: 12.2 CM2
LEFT ATRIUM SIZE: 3.7 CM
LEFT INTERNAL DIMENSION IN SYSTOLE: 2.2 CM (ref 6.54–9.92)
LEFT VENTRICULAR INTERNAL DIMENSION IN DIASTOLE: 4 CM (ref 11.11–16.57)
LEFT VENTRICULAR POSTERIOR WALL IN END DIASTOLE: 1.4 CM (ref 0.57–1.08)
LEFT VENTRICULAR STROKE VOLUME: 53 ML
LVSV (TEICH): 53 ML
MV E'TISSUE VEL-SEP: 7 CM/S
MV PEAK A VEL: 0.98 M/S
MV PEAK E VEL: 74 CM/S
MV STENOSIS PRESSURE HALF TIME: 0 MS
NUC STRESS EJECTION FRACTION: 56 %
RA PRESSURE ESTIMATED: 10 MMHG
RIGHT ATRIUM AREA SYSTOLE A4C: 9.7 CM2
RIGHT VENTRICLE ID DIMENSION: 2.3 CM
RV PSP: 38 MMHG
SL CV LV EF: 60
SL CV PED ECHO LEFT VENTRICLE DIASTOLIC VOLUME (MOD BIPLANE) 2D: 70 ML
SL CV PED ECHO LEFT VENTRICLE SYSTOLIC VOLUME (MOD BIPLANE) 2D: 16 ML
SL CV REST NUCLEAR ISOTOPE DOSE: 16.4 MCI
SL CV STRESS NUCLEAR ISOTOPE DOSE: 48.8 MCI
STRESS ST DEPRESSION: 0 MM
STRESS/REST PERFUSION RATIO: 0.93
TR MAX PG: 28 MMHG
TR PEAK VELOCITY: 2.6 M/S
TRICUSPID ANNULAR PLANE SYSTOLIC EXCURSION: 2.2 CM
TRICUSPID VALVE PEAK REGURGITATION VELOCITY: 2.64 M/S
Z-SCORE OF INTERVENTRICULAR SEPTUM IN END DIASTOLE: 5.13
Z-SCORE OF LEFT VENTRICULAR DIMENSION IN END DIASTOLE: -12.22
Z-SCORE OF LEFT VENTRICULAR DIMENSION IN END SYSTOLE: -10.26
Z-SCORE OF LEFT VENTRICULAR POSTERIOR WALL IN END DIASTOLE: 4.47

## 2022-02-17 PROCEDURE — 93306 TTE W/DOPPLER COMPLETE: CPT

## 2022-02-17 PROCEDURE — 78452 HT MUSCLE IMAGE SPECT MULT: CPT | Performed by: INTERNAL MEDICINE

## 2022-02-17 PROCEDURE — 78452 HT MUSCLE IMAGE SPECT MULT: CPT

## 2022-02-17 PROCEDURE — 93017 CV STRESS TEST TRACING ONLY: CPT

## 2022-02-17 PROCEDURE — 93306 TTE W/DOPPLER COMPLETE: CPT | Performed by: INTERNAL MEDICINE

## 2022-02-17 PROCEDURE — A9502 TC99M TETROFOSMIN: HCPCS

## 2022-02-17 PROCEDURE — 93016 CV STRESS TEST SUPVJ ONLY: CPT | Performed by: INTERNAL MEDICINE

## 2022-02-17 PROCEDURE — 93018 CV STRESS TEST I&R ONLY: CPT | Performed by: INTERNAL MEDICINE

## 2022-02-17 RX ADMIN — REGADENOSON 0.4 MG: 0.08 INJECTION, SOLUTION INTRAVENOUS at 09:18

## 2022-04-04 DIAGNOSIS — E78.2 MIXED HYPERLIPIDEMIA: ICD-10-CM

## 2022-04-04 RX ORDER — EZETIMIBE 10 MG/1
10 TABLET ORAL DAILY
Qty: 90 TABLET | Refills: 3 | Status: SHIPPED | OUTPATIENT
Start: 2022-04-04

## 2022-04-07 ENCOUNTER — HOSPITAL ENCOUNTER (OUTPATIENT)
Dept: SLEEP CENTER | Facility: CLINIC | Age: 67
Discharge: HOME | End: 2022-04-07
Payer: MEDICARE

## 2022-04-07 DIAGNOSIS — I25.119 ATHEROSCLEROSIS OF CORONARY ARTERY WITH ANGINA PECTORIS, UNSPECIFIED VESSEL OR LESION TYPE, UNSPECIFIED WHETHER NATIVE OR TRANSPLANTED HEART (HCC): ICD-10-CM

## 2022-04-07 DIAGNOSIS — I25.10 ATHEROSCLEROSIS OF NATIVE CORONARY ARTERY OF NATIVE HEART WITHOUT ANGINA PECTORIS: ICD-10-CM

## 2022-04-07 DIAGNOSIS — I10 BENIGN ESSENTIAL HYPERTENSION: ICD-10-CM

## 2022-04-07 DIAGNOSIS — E78.2 MIXED HYPERLIPIDEMIA: ICD-10-CM

## 2022-04-07 DIAGNOSIS — Z95.5 S/P CORONARY ARTERY STENT PLACEMENT: ICD-10-CM

## 2022-04-07 DIAGNOSIS — G47.19 DAYTIME HYPERSOMNOLENCE: ICD-10-CM

## 2022-04-07 DIAGNOSIS — R06.00 DOE (DYSPNEA ON EXERTION): ICD-10-CM

## 2022-04-07 PROCEDURE — G0399 HOME SLEEP TEST/TYPE 3 PORTA: HCPCS

## 2022-04-08 NOTE — PROGRESS NOTES
Home Sleep Study Documentation    Pre-Sleep Home Study:    Set-up and instructions performed by: TROY Juarez, RST, CRT    Technician performed demonstration for Patient: yes    Return demonstration performed by Patient: yes    Written instructions provided to Patient: yes    Patient signed consent form: yes        Post-Sleep Home Study:    Additional comments by Patient: none    Home Sleep Study Failed:no:    Failure reason: N/A    Reported or Detected: N/A    Scored by: CHEYENNE Delgado RPSGT

## 2022-04-10 PROCEDURE — 95806 SLEEP STUDY UNATT&RESP EFFT: CPT | Performed by: PSYCHIATRY & NEUROLOGY

## 2022-04-20 ENCOUNTER — TELEPHONE (OUTPATIENT)
Dept: SLEEP CENTER | Facility: CLINIC | Age: 67
End: 2022-04-20

## 2022-04-20 NOTE — TELEPHONE ENCOUNTER
Sleep study shows moderate LIZ  Patient needs to schedule consult  Left message for the patient to call back for results

## 2022-04-22 NOTE — TELEPHONE ENCOUNTER
Returned the patient's call spoke with his wife  Scheduled consult with Dr Zay Hicks around farming schedule

## 2022-05-08 ENCOUNTER — OFFICE VISIT (OUTPATIENT)
Dept: URGENT CARE | Facility: MEDICAL CENTER | Age: 67
End: 2022-05-08
Payer: MEDICARE

## 2022-05-08 VITALS
RESPIRATION RATE: 18 BRPM | BODY MASS INDEX: 40.14 KG/M2 | DIASTOLIC BLOOD PRESSURE: 80 MMHG | HEART RATE: 66 BPM | HEIGHT: 69 IN | SYSTOLIC BLOOD PRESSURE: 175 MMHG | WEIGHT: 271 LBS | TEMPERATURE: 97.6 F | OXYGEN SATURATION: 100 %

## 2022-05-08 DIAGNOSIS — S39.012A STRAIN OF LUMBAR REGION, INITIAL ENCOUNTER: Primary | ICD-10-CM

## 2022-05-08 PROCEDURE — 99212 OFFICE O/P EST SF 10 MIN: CPT | Performed by: PHYSICIAN ASSISTANT

## 2022-05-08 PROCEDURE — G0463 HOSPITAL OUTPT CLINIC VISIT: HCPCS | Performed by: PHYSICIAN ASSISTANT

## 2022-05-08 RX ORDER — BACLOFEN 20 MG/1
20 TABLET ORAL 3 TIMES DAILY
Qty: 30 TABLET | Refills: 0 | Status: SHIPPED | OUTPATIENT
Start: 2022-05-08 | End: 2022-05-09

## 2022-05-08 RX ORDER — METHYLPREDNISOLONE 4 MG/1
TABLET ORAL
Qty: 1 EACH | Refills: 0 | Status: SHIPPED | OUTPATIENT
Start: 2022-05-08 | End: 2022-05-18 | Stop reason: ALTCHOICE

## 2022-05-08 NOTE — PATIENT INSTRUCTIONS
Acute Low Back Pain, Ambulatory Care   GENERAL INFORMATION:   Acute low back pain  is discomfort in your lower back area that lasts for less than 12 weeks  The word acute is used to describe pain that starts suddenly, worsens quickly, and lasts for a short time  Common symptoms include the following:   · Back stiffness or spasms    · Pain down the back or side of one leg    · Holding yourself in an unusual position or posture to decrease your back pain    · Not being able to find a sitting position that is comfortable    · Slow increase in your pain for 24 to 48 hours after you stress your back    · Tenderness on your lower back or severe pain when you move your back  Seek immediate care for the following symptoms:   · Severe pain    · Sudden stiffness and heaviness in both buttocks down to both legs    · Numbness or weakness in one leg, or pain in both legs    · Numbness in your genital area or across your lower back    · Unable to control your urine or bowel movements  Treatment for acute low back pain  may include any of the following:  · Medicines:      ¨ NSAIDs  help decrease swelling and pain or fever  This medicine is available with or without a doctor's order  NSAIDs can cause stomach bleeding or kidney problems in certain people  If you take blood thinner medicine, always ask your healthcare provider if NSAIDs are safe for you  Always read the medicine label and follow directions  ¨ Muscle relaxers  help decrease muscle spasms pain  ¨ Prescription pain medicine  may be given  Ask how to take this medicine safely  · Surgery  may be needed if your pain is severe and other treatments do not work  Surgery may be needed for conditions of the lumbar spine, such as herniated disc or spinal stenosis  Manage your symptoms:   · Sleep on a firm mattress  If you do not have a firm mattress, have someone move your mattress to the floor for a few days   A piece of plywood under your mattress can also help make it firmer  · Apply ice  on your lower back for 15 to 20 minutes every hour or as directed  Use an ice pack, or put crushed ice in a plastic bag  Cover it with a towel  Ice helps prevent tissue damage and decreases swelling and pain  You can alternate ice and heat  · Apply heat  on your lower back for 20 to 30 minutes every 2 hours for as many days as directed  Heat helps decrease pain and muscle spasms  · Go to physical therapy  A physical therapist teaches you exercises to help improve movement and strength, and to decrease pain  Prevent acute low back pain:   · Use proper body mechanics  ¨ Bend at the hips and knees when you  objects  Do not bend from the waist  Use your leg muscles as you lift the load  Do not use your back  Keep the object close to your chest as you lift it  Try not to twist or lift anything above your waist     ¨ Change your position often when you stand for long periods of time  Rest one foot on a small box or footrest, and then switch to the other foot often  ¨ Try not to sit for long periods of time  When you do, sit in a straight-backed chair with your feet flat on the floor  Never reach, pull, or push while you are sitting  · Exercise regularly  Warm up before you exercise  Do exercises that strengthen your back muscles  Ask about the best exercise plan for you  · Maintain a healthy weight  Ask your healthcare provider how much you should weigh  Ask him to help you create a weight loss plan if you are overweight  Follow up with your healthcare provider as directed:  Return for a follow-up visit if you still have pain after 1 to 3 weeks of treatment  You may need to visit an orthopedist if your back pain lasts more than 6 to 12 weeks  Write down your questions so you remember to ask them during your visits  CARE AGREEMENT:   You have the right to help plan your care  Learn about your health condition and how it may be treated   Discuss treatment options with your caregivers to decide what care you want to receive  You always have the right to refuse treatment  The above information is an  only  It is not intended as medical advice for individual conditions or treatments  Talk to your doctor, nurse or pharmacist before following any medical regimen to see if it is safe and effective for you  © 2014 [address] is for End User's use only and may not be sold, redistributed or otherwise used for commercial purposes  All illustrations and images included in CareNotes® are the copyrighted property of A D A M , Inc  or Asif Quintana

## 2022-05-08 NOTE — PROGRESS NOTES
[address]        NAME: Amarilys Allison is a 75 y o  male  : 1955    MRN: [id number]  DATE: May 8, 2022  TIME: 10:02 AM    Assessment and Plan   No primary diagnosis found  No diagnosis found  Patient Instructions       Follow up with PCP in 3-5 days  Proceed to  ER if symptoms worsen  Chief Complaint     Chief Complaint   Patient presents with    Back Pain     lower back pain, started yesterday          History of Present Illness       Patient started with back pain yesterday  He went to bend over to  an object this morning and had increased pain in his low back  The pain is worse when he goes to straighten up  Pain stays in his lower back does not radiated to his lower extremities he denies any bowel or bladder and incontinence saddle anesthesia or lower extremity muscle weakness  Review of Systems   Review of Systems   Constitutional: Negative for chills and fever  Genitourinary: Negative for difficulty urinating  Musculoskeletal: Positive for back pain  Neurological: Negative for weakness and numbness  Current Medications       Current Outpatient Medications:     aspirin 81 MG tablet, Take 81 mg by mouth daily  , Disp: , Rfl:     Cholecalciferol (Vitamin D3) 1 25 MG (83209 UT) CAPS, Take 1 capsule (50,000 Units total) by mouth once a week, Disp: 12 capsule, Rfl: 3    ezetimibe (ZETIA) 10 mg tablet, Take 1 tablet (10 mg total) by mouth daily, Disp: 90 tablet, Rfl: 3    Garlic Oil 2583 MG CAPS, Take 2 capsules by mouth daily, Disp: , Rfl:     nitroglycerin (NITROSTAT) 0 4 mg SL tablet, Place 1 tablet (0 4 mg total) under the tongue every 5 (five) minutes as needed for chest pain, Disp: 25 tablet, Rfl: 3    rosuvastatin (CRESTOR) 20 MG tablet, Take 1 tablet (20 mg total) by mouth daily, Disp: 90 tablet, Rfl: 3    Current Allergies     Allergies as of 2022 - Reviewed 2022   Allergen Reaction Noted    Erythromycin base Diarrhea 2019  Other Other (See Comments)     Oxycodone-acetaminophen Other (See Comments)     Percocet [oxycodone-acetaminophen] Other (See Comments) 01/27/2016    Penicillins Hives and Rash 01/05/2012            The following portions of the patient's history were reviewed and updated as appropriate: allergies, current medications, past family history, past medical history, past social history, past surgical history and problem list      Past Medical History:   Diagnosis Date    Cardiac disease     Hypertension     Myocardial infarction [facility])        Past Surgical History:   Procedure Laterality Date    CORONARY STENT PLACEMENT      HERNIA REPAIR      OTHER SURGICAL HISTORY  09/18/2010    Arterial catheterization    DE COLONOSCOPY FLX DX W/COLLJ SPEC WHEN PFRMD N/A 3/27/2019    Procedure: COLONOSCOPY;  Surgeon: Ana Cristina Gonzalez MD;  Location: [facility];  Service: Gastroenterology    TONSILLECTOMY AND ADENOIDECTOMY      TOTAL HIP ARTHROPLASTY         Family History   Problem Relation Age of Onset    Breast cancer Mother     Coronary artery disease Mother     Diabetes Mother     Hyperlipidemia Mother     Atrial fibrillation Father          Medications have been verified  Objective   BP (!) 175/80   Pulse 66   Temp 97 6 °F (36 4 °C)   Resp 18   Ht 5' 9" (1 753 m)   Wt 123 kg (271 lb)   SpO2 100%   BMI 40 02 kg/m²   No LMP for male patient  Physical Exam     Physical Exam  Vitals and nursing note reviewed  Constitutional:       Appearance: Normal appearance  HENT:      Head: Normocephalic and atraumatic  Cardiovascular:      Rate and Rhythm: Normal rate and regular rhythm  Pulmonary:      Effort: Pulmonary effort is normal    Musculoskeletal:      Comments: No midline lumbar vertebral tenderness on palpation  There is bilateral lumbar paraspinal muscle tenderness with spasm  Diminished range of motion of the lumbar spine secondary to pain  Skin:     General: Skin is warm  Neurological:      Mental Status: He is alert

## 2022-05-09 ENCOUNTER — TELEPHONE (OUTPATIENT)
Dept: INTERNAL MEDICINE CLINIC | Facility: CLINIC | Age: 67
End: 2022-05-09

## 2022-05-09 DIAGNOSIS — M62.838 MUSCLE SPASM: Primary | ICD-10-CM

## 2022-05-09 RX ORDER — CYCLOBENZAPRINE HCL 10 MG
10 TABLET ORAL
Qty: 30 TABLET | Refills: 0 | Status: SHIPPED | OUTPATIENT
Start: 2022-05-09

## 2022-05-09 NOTE — TELEPHONE ENCOUNTER
Asher Levy called in regards to her  Gerard San went to the Urgent Care last night due to back spasms  They put him on Baclofen & methyiprednisolone for the spasms  Asher Lockett was calling because Gerard San was feeling "fuzzy and sleeping" and his eyes are feeling weird  Sunitha Means wanted him to take his blood pressure and it was 143/84 with a pulse of 64 around 09:30  Sunitha Means said he could stop taking the Baclofen and see if that helps but that she would like him to lay down and rest and recheck his blood pressure and call us back  She also stated that he could use heat and ice to to help with the spasms if he was still having issues with them  Asher Lockett said she would call back with a new blood pressure after Gerard San gets up

## 2022-05-09 NOTE — TELEPHONE ENCOUNTER
Devaughn Turpin called back to [facility] blood pressure was 133/71  She stated that he was not laying down and he was out on the tractor doing some work  She stated that his pain is more of a soreness and that he is not going to take the medication prescribed to him last night anymore  She did comment that he wanted to start using the Cyclobercapr 10 mg prescription that Dasia Barajas gave him before, but that it was

## 2022-05-18 ENCOUNTER — OFFICE VISIT (OUTPATIENT)
Dept: INTERNAL MEDICINE CLINIC | Facility: CLINIC | Age: 67
End: 2022-05-18
Payer: MEDICARE

## 2022-05-18 VITALS
DIASTOLIC BLOOD PRESSURE: 90 MMHG | TEMPERATURE: 97.2 F | BODY MASS INDEX: 39.6 KG/M2 | HEART RATE: 68 BPM | WEIGHT: 267.4 LBS | SYSTOLIC BLOOD PRESSURE: 130 MMHG | OXYGEN SATURATION: 96 % | HEIGHT: 69 IN

## 2022-05-18 DIAGNOSIS — Z12.5 SCREENING FOR PROSTATE CANCER: ICD-10-CM

## 2022-05-18 DIAGNOSIS — I25.119 ATHEROSCLEROSIS OF CORONARY ARTERY WITH ANGINA PECTORIS, UNSPECIFIED VESSEL OR LESION TYPE, UNSPECIFIED WHETHER NATIVE OR TRANSPLANTED HEART (HCC): ICD-10-CM

## 2022-05-18 DIAGNOSIS — E78.2 MIXED HYPERLIPIDEMIA: ICD-10-CM

## 2022-05-18 DIAGNOSIS — E78.00 PURE HYPERCHOLESTEROLEMIA: ICD-10-CM

## 2022-05-18 DIAGNOSIS — Z00.00 MEDICARE ANNUAL WELLNESS VISIT, SUBSEQUENT: Primary | ICD-10-CM

## 2022-05-18 DIAGNOSIS — I25.10 ATHEROSCLEROSIS OF NATIVE CORONARY ARTERY OF NATIVE HEART WITHOUT ANGINA PECTORIS: ICD-10-CM

## 2022-05-18 DIAGNOSIS — R73.03 PREDIABETES: ICD-10-CM

## 2022-05-18 DIAGNOSIS — M62.838 MUSCLE SPASM: Primary | ICD-10-CM

## 2022-05-18 PROCEDURE — 99214 OFFICE O/P EST MOD 30 MIN: CPT | Performed by: NURSE PRACTITIONER

## 2022-05-18 PROCEDURE — G0439 PPPS, SUBSEQ VISIT: HCPCS | Performed by: NURSE PRACTITIONER

## 2022-05-18 PROCEDURE — 1123F ACP DISCUSS/DSCN MKR DOCD: CPT | Performed by: NURSE PRACTITIONER

## 2022-05-18 RX ORDER — LIDOCAINE 50 MG/G
1 PATCH TOPICAL DAILY
Qty: 30 PATCH | Refills: 3 | Status: SHIPPED | OUTPATIENT
Start: 2022-05-18

## 2022-05-18 NOTE — PROGRESS NOTES
Assessment and Plan:     Problem List Items Addressed This Visit        Cardiovascular and Mediastinum    Atherosclerotic heart disease of native coronary artery without angina pectoris    Relevant Orders    Comprehensive metabolic panel    CBC and differential    TSH, 3rd generation with Free T4 reflex    Coronary atherosclerosis    Relevant Orders    Comprehensive metabolic panel    CBC and differential    TSH, 3rd generation with Free T4 reflex    Comprehensive metabolic panel    CBC and differential    TSH, 3rd generation with Free T4 reflex       Other    Hyperlipidemia    Relevant Orders    Comprehensive metabolic panel    CBC and differential    TSH, 3rd generation with Free T4 reflex    Lipid panel    Comprehensive metabolic panel    CBC and differential    TSH, 3rd generation with Free T4 reflex    Pure hypercholesterolemia    Relevant Orders    Comprehensive metabolic panel    CBC and differential    TSH, 3rd generation with Free T4 reflex    Lipid panel    Prediabetes    Relevant Orders    Comprehensive metabolic panel    CBC and differential    TSH, 3rd generation with Free T4 reflex    HEMOGLOBIN A1C W/ EAG ESTIMATION    Medicare annual wellness visit, subsequent - Primary    Relevant Orders    Comprehensive metabolic panel    CBC and differential    TSH, 3rd generation with Free T4 reflex      Other Visit Diagnoses     Screening for prostate cancer        Relevant Orders    PSA, Total Screen           Preventive health issues were discussed with patient, and age appropriate screening tests were ordered as noted in patient's After Visit Summary  Personalized health advice and appropriate referrals for health education or preventive services given if needed, as noted in patient's After Visit Summary       History of Present Illness:     Patient presents for Medicare Annual Wellness visit    Patient Care Team:  Juventino Hummel as PCP - General (Family Medicine)  MD Kell Linaresody Poster, DO Arsen Green MD as Endoscopist     Problem List:     Patient Active Problem List   Diagnosis    Anxiety    Atherosclerotic heart disease of native coronary artery without angina pectoris    Coronary atherosclerosis    Benign essential hypertension    Hyperlipidemia    Joint inflammation    Vitamin D deficiency    Acquired scoliosis    Carotid artery occlusion    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    Localized primary osteoarthritis of wrist    Lumbosacral spondylosis without myelopathy    Old intraocular magnetic foreign body    Osteoarthritis of hip    Overweight    Pure hypercholesterolemia    S/P coronary artery stent placement    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[facility] [address] )    Colon cancer screening    Right wrist pain    BMI 38 0-38 9,adult    Impingement syndrome of right shoulder    COVID-19    Prediabetes    Daytime hypersomnolence    Morbid obesity with BMI of 40 0-44 9, adult ([facility] [address] )    LIZ (obstructive sleep apnea)    Medicare annual wellness visit, subsequent      Past Medical and Surgical History:     Past Medical History:   Diagnosis Date    Cardiac disease     Hypertension     Myocardial infarction [facility])      Past Surgical History:   Procedure Laterality Date    CORONARY STENT PLACEMENT      HERNIA REPAIR      OTHER SURGICAL HISTORY  09/18/2010    Arterial catheterization    MD COLONOSCOPY FLX DX W/COLLJ SPEC WHEN PFRMD N/A 3/27/2019    Procedure: COLONOSCOPY;  Surgeon: Arsen Arenas MD;  Location: [facility];  Service: Gastroenterology    TONSILLECTOMY AND ADENOIDECTOMY      TOTAL HIP ARTHROPLASTY        Family History:     Family History   Problem Relation Age of Onset    Breast cancer Mother     Coronary artery disease Mother     Diabetes Mother     Hyperlipidemia Mother     Atrial fibrillation Father       Social History:     Social History     Socioeconomic History    Marital status: /Civil [employer]     Spouse name: None    Number of children: None    Years of education: None    Highest education level: None   Occupational History    None   Tobacco Use    Smoking status: Never Smoker    Smokeless tobacco: Never Used   Vaping Use    Vaping Use: Never used   Substance and Sexual Activity    Alcohol use: No    Drug use: No    Sexual activity: None   Other Topics Concern    None   Social History Narrative    Dental care, regularly    Good dental hygiene    No caffeine use    Sun protection sunscreen     Social Determinants of Health     Financial Resource Strain: Not on file   Food Insecurity: Not on file   Transportation Needs: Not on file   Physical Activity: Not on file   Stress: Not on file   Social Connections: Not on file   Intimate Partner Violence: Not on file   Housing Stability: Not on file      Medications and Allergies:     Current Outpatient Medications   Medication Sig Dispense Refill    aspirin 81 MG tablet Take 81 mg by mouth daily   Cholecalciferol (Vitamin D3) 1 25 MG (78322 UT) CAPS Take 1 capsule (50,000 Units total) by mouth once a week 12 capsule 3    cyclobenzaprine (FLEXERIL) 10 mg tablet Take 1 tablet (10 mg total) by mouth daily at bedtime As needed 30 tablet 0    ezetimibe (ZETIA) 10 mg tablet Take 1 tablet (10 mg total) by mouth daily 90 tablet 3    Garlic Oil 3924 MG CAPS Take 2 capsules by mouth daily      nitroglycerin (NITROSTAT) 0 4 mg SL tablet Place 1 tablet (0 4 mg total) under the tongue every 5 (five) minutes as needed for chest pain 25 tablet 3    rosuvastatin (CRESTOR) 20 MG tablet Take 1 tablet (20 mg total) by mouth daily 90 tablet 3     No current facility-administered medications for this visit       Allergies   Allergen Reactions    Erythromycin Base Diarrhea    Other Other (See Comments)     HIGH DOSE ASPIRINS    Nose bleeds    Oxycodone-Acetaminophen Other (See Comments)     hallucinations    Percocet [Oxycodone-Acetaminophen] Other (See Comments)     hallucinations    Penicillins Hives and Rash      Immunizations:     Immunization History   Administered Date(s) Administered    COVID-19 PFIZER VACCINE 0 3 ML IM 09/03/2021    Influenza, high dose seasonal 0 7 mL 11/14/2019, 11/06/2020    Pneumococcal Conjugate 13-Valent 11/14/2019    Pneumococcal Polysaccharide PPV23 02/23/2021    Tdap 05/06/2017      Health Maintenance:         Topic Date Due    Colorectal Cancer Screening  03/27/2029    Hepatitis C Screening  Completed         Topic Date Due    DTaP,Tdap,and Td Vaccines (1 - Tdap) 05/06/2017    COVID-19 Vaccine (2 - Pfizer series) 09/24/2021      Medicare Health Risk Assessment:     /90 (BP Location: Left arm, Patient Position: Sitting, Cuff Size: Large)   Pulse 68   Temp (!) 97 2 °F (36 2 °C)   Ht 5' 9" (1 753 m)   Wt 121 kg (267 lb 6 4 oz)   SpO2 96%   BMI 39 49 kg/m²      Marlon Vail is here for his Subsequent Wellness visit  Health Risk Assessment:   Patient rates overall health as good  Patient feels that their physical health rating is same  Patient is satisfied with their life  Eyesight was rated as slightly worse  Hearing was rated as same  Patient feels that their emotional and mental health rating is same  Patients states they are never, rarely angry  Patient states they are sometimes unusually tired/fatigued  Pain experienced in the last 7 days has been some  Patient's pain rating has been 9/10  Patient states that he has experienced no weight loss or gain in last 6 months  Depression Screening:   PHQ-2 Score: 0      Fall Risk Screening: In the past year, patient has experienced: no history of falling in past year      Home Safety:  Patient does not have trouble with stairs inside or outside of their home  Patient has working smoke alarms and has working carbon monoxide detector  Home safety hazards include: none  Nutrition:   Current diet is Regular       Medications: Patient is currently taking over-the-counter supplements  OTC medications include: see medication list  Patient is able to manage medications  Activities of Daily Living (ADLs)/Instrumental Activities of Daily Living (IADLs):   Walk and transfer into and out of bed and chair?: Yes  Dress and groom yourself?: Yes    Bathe or shower yourself?: Yes    Feed yourself? Yes  Do your laundry/housekeeping?: Yes  Manage your money, pay your bills and track your expenses?: Yes  Make your own meals?: Yes    Do your own shopping?: Yes    Previous Hospitalizations:   Any hospitalizations or ED visits within the last 12 months?: Yes    How many hospitalizations have you had in the last year?: 1-2    Advance Care Planning:   Living will: Yes    Durable POA for healthcare:  Yes    Advanced directive: Yes    Advanced directive counseling given: No    Five wishes given: No    Patient declined ACP directive: No    End of Life Decisions reviewed with patient: No    Provider agrees with end of life decisions: Yes      Cognitive Screening:   Provider or family/friend/caregiver concerned regarding cognition?: No    PREVENTIVE SCREENINGS      Cardiovascular Screening:    General: History Lipid Disorder and Risks and Benefits Discussed    Due for: Lipid Panel, Cholesterol and Triglycerides      Diabetes Screening:     General: Risks and Benefits Discussed    Due for: Blood Glucose      Colorectal Cancer Screening:     General: Screening Current      Prostate Cancer Screening:    General: Risks and Benefits Discussed    Due for: PSA      Osteoporosis Screening:    General: Screening Not Indicated      Abdominal Aortic Aneurysm (AAA) Screening:    Risk factors include: age between 73-67 yo        Lung Cancer Screening:     General: Screening Not Indicated      Hepatitis C Screening:    General: Screening Current    Screening, Brief Intervention, and Referral to Treatment (SBIRT)    Screening  Typical number of drinks in a day: 0  Typical number of drinks in a week: 0  Interpretation: Low risk drinking behavior      Single Item Drug Screening:  How often have you used an illegal drug (including marijuana) or a prescription medication for non-medical reasons in the past year? never    Single Item Drug Screen Score: 0  Interpretation: Negative screen for possible drug use disorder      YUNI Vick

## 2022-05-18 NOTE — PATIENT INSTRUCTIONS

## 2022-05-18 NOTE — PROGRESS NOTES
Assessment/Plan:Patient is following up with Cardiology and did have recent stress test done which was normal  He did see them in February and is now taking Zetia   He is taking Crestor 20 mg and can not tolerate 40 mg  Last lab work did show his LDL is at 50and triglycerides 126  Vitamin D still low at 52  He is deferring vaccines at this time  /78  Recent colonoscopy did show internal hemorrhoids with no polyps  Other screenings are up to date  Will order fasting labs with A1C  Will continue Flexeril and did offer PT but deferring at this time  Did have both covid boosters  Will notify him once labs are back and will follow up in 6 months or sooner if need be         No problem-specific Assessment & Plan notes found for this encounter           Problem List Items Addressed This Visit        Cardiovascular and Mediastinum    Atherosclerotic heart disease of native coronary artery without angina pectoris    Relevant Orders    Comprehensive metabolic panel    CBC and differential    TSH, 3rd generation with Free T4 reflex    Coronary atherosclerosis    Relevant Orders    Comprehensive metabolic panel    CBC and differential    TSH, 3rd generation with Free T4 reflex    Comprehensive metabolic panel    CBC and differential    TSH, 3rd generation with Free T4 reflex       Other    Hyperlipidemia    Relevant Orders    Comprehensive metabolic panel    CBC and differential    TSH, 3rd generation with Free T4 reflex    Lipid panel    Comprehensive metabolic panel    CBC and differential    TSH, 3rd generation with Free T4 reflex    Pure hypercholesterolemia    Relevant Orders    Comprehensive metabolic panel    CBC and differential    TSH, 3rd generation with Free T4 reflex    Lipid panel    Prediabetes    Relevant Orders    Comprehensive metabolic panel    CBC and differential    TSH, 3rd generation with Free T4 reflex    HEMOGLOBIN A1C W/ EAG ESTIMATION    Medicare annual wellness visit, subsequent - Primary Relevant Orders    Comprehensive metabolic panel    CBC and differential    TSH, 3rd generation with Free T4 reflex      Other Visit Diagnoses     Screening for prostate cancer        Relevant Orders    PSA, Total Screen            Subjective:      Patient ID: Jeffery Perez is a 75 y o  male  Jhonny Roly is for a follow up visit and medicare wellness  He several weeks ago was having issues with his back and muscle spasms  He was seen in the  and was given Baclofen but after taking it was having high BP and did not feel right  Since stopping them and taking the Flexeril he is feeling a little better  He does see Cardiology and did have a sleep study done showing moderate sleep apnea  He has not yet received his machine  He denies any chest pain, SOB, or palpitations  He does not need any refills  He offers no other issues  The following portions of the patient's history were reviewed and updated as appropriate: He  has a past medical history of Cardiac disease, Hypertension, and Myocardial infarction ([facility] )    He   Patient Active Problem List    Diagnosis Date Noted    Medicare annual wellness visit, subsequent 05/18/2022    LIZ (obstructive sleep apnea)     Daytime hypersomnolence 02/02/2022    Morbid obesity with BMI of 40 0-44 9, adult ([facility] ) 02/02/2022    Prediabetes 09/20/2021    COVID-19 12/16/2020    Impingement syndrome of right shoulder 11/06/2020    BMI 38 0-38 9,adult 03/19/2020    Right wrist pain 09/20/2019    Colon cancer screening 03/07/2019    Acquired scoliosis 03/05/2019    Carotid artery occlusion 03/05/2019    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 03/05/2019    Lumbosacral spondylosis without myelopathy 03/05/2019    Old intraocular magnetic foreign body 03/05/2019    Overweight 09/04/2018    Pure hypercholesterolemia 09/04/2018    S/P coronary artery stent placement 09/04/2018    Joint inflammation 05/30/2017    Localized primary osteoarthritis of wrist 02/01/2017    Vitamin D deficiency 03/06/2015    Anxiety 05/21/2013    Atherosclerotic heart disease of native coronary artery without angina pectoris 11/29/2012    Hyperlipidemia 11/29/2012    Coronary atherosclerosis 11/17/2010    Benign essential hypertension 11/17/2010    Osteoarthritis of hip 86/95/4271    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[facility] ) 11/11/2008     He  has a past surgical history that includes Coronary stent placement; Total hip arthroplasty; Hernia repair; Other surgical history (09/18/2010); Tonsillectomy and adenoidectomy; and pr colonoscopy flx dx w/collj spec when pfrmd (N/A, 3/27/2019)  His family history includes Atrial fibrillation in his father; Breast cancer in his mother; Coronary artery disease in his mother; Diabetes in his mother; Hyperlipidemia in his mother  He  reports that he has never smoked  He has never used smokeless tobacco  He reports that he does not drink alcohol and does not use drugs  Current Outpatient Medications   Medication Sig Dispense Refill    aspirin 81 MG tablet Take 81 mg by mouth daily   Cholecalciferol (Vitamin D3) 1 25 MG (44141 UT) CAPS Take 1 capsule (50,000 Units total) by mouth once a week 12 capsule 3    cyclobenzaprine (FLEXERIL) 10 mg tablet Take 1 tablet (10 mg total) by mouth daily at bedtime As needed 30 tablet 0    ezetimibe (ZETIA) 10 mg tablet Take 1 tablet (10 mg total) by mouth daily 90 tablet 3    Garlic Oil 6478 MG CAPS Take 2 capsules by mouth daily      nitroglycerin (NITROSTAT) 0 4 mg SL tablet Place 1 tablet (0 4 mg total) under the tongue every 5 (five) minutes as needed for chest pain 25 tablet 3    rosuvastatin (CRESTOR) 20 MG tablet Take 1 tablet (20 mg total) by mouth daily 90 tablet 3     No current facility-administered medications for this visit  Current Outpatient Medications on File Prior to Visit   Medication Sig    aspirin 81 MG tablet Take 81 mg by mouth daily      Cholecalciferol (Vitamin D3) 1 25 MG (92706 UT) CAPS Take 1 capsule (50,000 Units total) by mouth once a week    cyclobenzaprine (FLEXERIL) 10 mg tablet Take 1 tablet (10 mg total) by mouth daily at bedtime As needed    ezetimibe (ZETIA) 10 mg tablet Take 1 tablet (10 mg total) by mouth daily    Garlic Oil 8986 MG CAPS Take 2 capsules by mouth daily    nitroglycerin (NITROSTAT) 0 4 mg SL tablet Place 1 tablet (0 4 mg total) under the tongue every 5 (five) minutes as needed for chest pain    rosuvastatin (CRESTOR) 20 MG tablet Take 1 tablet (20 mg total) by mouth daily    [DISCONTINUED] methylPREDNISolone 4 MG tablet therapy pack Use as directed on package (Patient not taking: Reported on 5/18/2022)     No current facility-administered medications on file prior to visit  He is allergic to erythromycin base, other, oxycodone-acetaminophen, percocet [oxycodone-acetaminophen], and penicillins       Review of Systems   Musculoskeletal: Positive for back pain  All other systems reviewed and are negative  Objective:      /90 (BP Location: Left arm, Patient Position: Sitting, Cuff Size: Large)   Pulse 68   Temp (!) 97 2 °F (36 2 °C)   Ht 5' 9" (1 753 m)   Wt 121 kg (267 lb 6 4 oz)   SpO2 96%   BMI 39 49 kg/m²          Physical Exam  Vitals reviewed  Constitutional:       Appearance: Normal appearance  He is normal weight  HENT:      Head: Normocephalic and atraumatic  Right Ear: Tympanic membrane, ear canal and external ear normal       Left Ear: Tympanic membrane, ear canal and external ear normal       Nose: Nose normal       Mouth/Throat:      Mouth: Mucous membranes are moist       Pharynx: Oropharynx is clear  Eyes:      Extraocular Movements: Extraocular movements intact  Conjunctiva/sclera: Conjunctivae normal       Pupils: Pupils are equal, round, and reactive to light  Cardiovascular:      Rate and Rhythm: Normal rate and regular rhythm  Pulses: Normal pulses        Heart sounds: Normal heart sounds  Pulmonary:      Effort: Pulmonary effort is normal       Breath sounds: Normal breath sounds  Abdominal:      General: Abdomen is flat  Bowel sounds are normal       Palpations: Abdomen is soft  Musculoskeletal:         General: Normal range of motion  Cervical back: Normal range of motion and neck supple  Skin:     General: Skin is warm and dry  Capillary Refill: Capillary refill takes less than 2 seconds  Neurological:      General: No focal deficit present  Mental Status: He is alert and oriented to person, place, and time  Mental status is at baseline  Psychiatric:         Mood and Affect: Mood normal          Behavior: Behavior normal          Thought Content:  Thought content normal          Judgment: Judgment normal

## 2022-05-21 ENCOUNTER — APPOINTMENT (OUTPATIENT)
Dept: LAB | Facility: MEDICAL CENTER | Age: 67
End: 2022-05-21
Payer: MEDICARE

## 2022-05-21 DIAGNOSIS — R73.03 PREDIABETES: ICD-10-CM

## 2022-05-21 DIAGNOSIS — E78.2 MIXED HYPERLIPIDEMIA: ICD-10-CM

## 2022-05-21 DIAGNOSIS — I25.119 ATHEROSCLEROSIS OF CORONARY ARTERY WITH ANGINA PECTORIS, UNSPECIFIED VESSEL OR LESION TYPE, UNSPECIFIED WHETHER NATIVE OR TRANSPLANTED HEART (HCC): ICD-10-CM

## 2022-05-21 DIAGNOSIS — Z00.00 MEDICARE ANNUAL WELLNESS VISIT, SUBSEQUENT: ICD-10-CM

## 2022-05-21 DIAGNOSIS — Z12.5 SCREENING FOR PROSTATE CANCER: ICD-10-CM

## 2022-05-21 DIAGNOSIS — E78.00 PURE HYPERCHOLESTEROLEMIA: ICD-10-CM

## 2022-05-21 DIAGNOSIS — I25.10 ATHEROSCLEROSIS OF NATIVE CORONARY ARTERY OF NATIVE HEART WITHOUT ANGINA PECTORIS: ICD-10-CM

## 2022-05-21 LAB
ALBUMIN SERPL BCP-MCNC: 3.7 G/DL (ref 3.5–5)
ALP SERPL-CCNC: 47 U/L (ref 46–116)
ALT SERPL W P-5'-P-CCNC: 33 U/L (ref 12–78)
ANION GAP SERPL CALCULATED.3IONS-SCNC: 4 MMOL/L (ref 4–13)
AST SERPL W P-5'-P-CCNC: 32 U/L (ref 5–45)
BASOPHILS # BLD AUTO: 0.02 THOUSANDS/ΜL (ref 0–0.1)
BASOPHILS NFR BLD AUTO: 0 % (ref 0–1)
BILIRUB SERPL-MCNC: 0.95 MG/DL (ref 0.2–1)
BUN SERPL-MCNC: 19 MG/DL (ref 5–25)
CALCIUM SERPL-MCNC: 9 MG/DL (ref 8.3–10.1)
CHLORIDE SERPL-SCNC: 105 MMOL/L (ref 100–108)
CHOLEST SERPL-MCNC: 120 MG/DL
CO2 SERPL-SCNC: 26 MMOL/L (ref 21–32)
CREAT SERPL-MCNC: 1.31 MG/DL (ref 0.6–1.3)
EOSINOPHIL # BLD AUTO: 0.23 THOUSAND/ΜL (ref 0–0.61)
EOSINOPHIL NFR BLD AUTO: 4 % (ref 0–6)
ERYTHROCYTE [DISTWIDTH] IN BLOOD BY AUTOMATED COUNT: 13.3 % (ref 11.6–15.1)
EST. AVERAGE GLUCOSE BLD GHB EST-MCNC: 137 MG/DL
GFR SERPL CREATININE-BSD FRML MDRD: 55 ML/MIN/1.73SQ M
GLUCOSE P FAST SERPL-MCNC: 114 MG/DL (ref 65–99)
HBA1C MFR BLD: 6.4 %
HCT VFR BLD AUTO: 48.1 % (ref 36.5–49.3)
HDLC SERPL-MCNC: 41 MG/DL
HGB BLD-MCNC: 16.1 G/DL (ref 12–17)
IMM GRANULOCYTES # BLD AUTO: 0.02 THOUSAND/UL (ref 0–0.2)
IMM GRANULOCYTES NFR BLD AUTO: 0 % (ref 0–2)
LDLC SERPL CALC-MCNC: 61 MG/DL (ref 0–100)
LYMPHOCYTES # BLD AUTO: 2.71 THOUSANDS/ΜL (ref 0.6–4.47)
LYMPHOCYTES NFR BLD AUTO: 42 % (ref 14–44)
MCH RBC QN AUTO: 30 PG (ref 26.8–34.3)
MCHC RBC AUTO-ENTMCNC: 33.5 G/DL (ref 31.4–37.4)
MCV RBC AUTO: 90 FL (ref 82–98)
MONOCYTES # BLD AUTO: 0.56 THOUSAND/ΜL (ref 0.17–1.22)
MONOCYTES NFR BLD AUTO: 9 % (ref 4–12)
NEUTROPHILS # BLD AUTO: 2.9 THOUSANDS/ΜL (ref 1.85–7.62)
NEUTS SEG NFR BLD AUTO: 45 % (ref 43–75)
NONHDLC SERPL-MCNC: 79 MG/DL
NRBC BLD AUTO-RTO: 0 /100 WBCS
PLATELET # BLD AUTO: 198 THOUSANDS/UL (ref 149–390)
PMV BLD AUTO: 12.6 FL (ref 8.9–12.7)
POTASSIUM SERPL-SCNC: 4.4 MMOL/L (ref 3.5–5.3)
PROT SERPL-MCNC: 6.8 G/DL (ref 6.4–8.2)
PSA SERPL-MCNC: 0.8 NG/ML (ref 0–4)
RBC # BLD AUTO: 5.36 MILLION/UL (ref 3.88–5.62)
SODIUM SERPL-SCNC: 135 MMOL/L (ref 136–145)
TRIGL SERPL-MCNC: 91 MG/DL
TSH SERPL DL<=0.05 MIU/L-ACNC: 1.83 UIU/ML (ref 0.45–4.5)
WBC # BLD AUTO: 6.44 THOUSAND/UL (ref 4.31–10.16)

## 2022-05-21 PROCEDURE — 36415 COLL VENOUS BLD VENIPUNCTURE: CPT

## 2022-05-21 PROCEDURE — 83036 HEMOGLOBIN GLYCOSYLATED A1C: CPT

## 2022-05-21 PROCEDURE — 84443 ASSAY THYROID STIM HORMONE: CPT

## 2022-05-21 PROCEDURE — 85025 COMPLETE CBC W/AUTO DIFF WBC: CPT

## 2022-05-21 PROCEDURE — 80053 COMPREHEN METABOLIC PANEL: CPT

## 2022-05-21 PROCEDURE — 80061 LIPID PANEL: CPT

## 2022-05-21 PROCEDURE — G0103 PSA SCREENING: HCPCS

## 2022-06-07 DIAGNOSIS — R73.03 PREDIABETES: Primary | ICD-10-CM

## 2022-06-07 RX ORDER — SEMAGLUTIDE 1.34 MG/ML
INJECTION, SOLUTION SUBCUTANEOUS
Qty: 3 ML | Refills: 3 | Status: SHIPPED | OUTPATIENT
Start: 2022-06-07

## 2022-06-21 ENCOUNTER — TELEPHONE (OUTPATIENT)
Dept: INTERNAL MEDICINE CLINIC | Facility: CLINIC | Age: 67
End: 2022-06-21

## 2022-06-21 NOTE — TELEPHONE ENCOUNTER
Savanna Russell called and left a voicemail that Jhonny Dunham is having issues going to the bathroom  Angeles Santana stated to use exlax right now and prunes  She also stated to start a daily dose of Miralax to help regulate him  She did state that if he starts to have runny stools to hold the Miralax for a few days and then start back up  I called Savanna Russell and let her know what Angeles Santana stated  She stated that she understood

## 2022-06-22 DIAGNOSIS — R11.0 NAUSEA: Primary | ICD-10-CM

## 2022-06-22 RX ORDER — ONDANSETRON 4 MG/1
8 TABLET, ORALLY DISINTEGRATING ORAL EVERY 8 HOURS PRN
Qty: 20 TABLET | Refills: 0 | Status: SHIPPED | OUTPATIENT
Start: 2022-06-22 | End: 2022-08-17

## 2022-06-22 NOTE — TELEPHONE ENCOUNTER
I called and checked on [place]  He is feeling a little better, the vomiting has stopped  He is drinking plain water now  He has not done the suppository yet as he had to go get farm parts at the store  He is going to try prune juice first and if that doesn't work will consider the suppository  He did go a very small amount

## 2022-06-22 NOTE — TELEPHONE ENCOUNTER
Patients wife called back today  Benson Lawson has tried white grape juice, coffee, and epsom salt with water  He is now throwing up from the epsom salt/water mix  She is concerned as his last normal bowel movement was on Friday  He has went "maybe a tablespoon here and there since"  As discussed,    Did advise NOT to do epsom salt and water  Is doing exactly what it does in humans- causing vomiting  You will send in zofran for him  Advised to do suppository to try and move things from the bottom down  Will call and check on patient later today

## 2022-06-29 DIAGNOSIS — E78.2 MIXED HYPERLIPIDEMIA: ICD-10-CM

## 2022-06-29 RX ORDER — ROSUVASTATIN CALCIUM 20 MG/1
TABLET, COATED ORAL
Qty: 90 TABLET | Refills: 0 | Status: SHIPPED | OUTPATIENT
Start: 2022-06-29

## 2022-07-14 ENCOUNTER — OFFICE VISIT (OUTPATIENT)
Dept: SLEEP CENTER | Facility: CLINIC | Age: 67
End: 2022-07-14
Payer: MEDICARE

## 2022-07-14 VITALS
HEART RATE: 63 BPM | SYSTOLIC BLOOD PRESSURE: 151 MMHG | WEIGHT: 265 LBS | DIASTOLIC BLOOD PRESSURE: 91 MMHG | HEIGHT: 69 IN | BODY MASS INDEX: 39.25 KG/M2

## 2022-07-14 DIAGNOSIS — G47.33 OSA (OBSTRUCTIVE SLEEP APNEA): Primary | ICD-10-CM

## 2022-07-14 PROCEDURE — 99204 OFFICE O/P NEW MOD 45 MIN: CPT | Performed by: PSYCHIATRY & NEUROLOGY

## 2022-07-14 NOTE — PROGRESS NOTES
Sleep Consultation   Miguelito Marshall 75 y o  male MRN: [id number]      Reason for consultation: Snoring, nighttime awakenings    Requesting physician: Kailee Kincaid (Cardiology)    Assessment/Plan    48-year-old male past medical history hyperlipidemia, MI status post stent, past surgical history tonsillectomy presents for evaluation for snoring and multiple nighttime awakenings  1  Moderate obstructive sleep apnea  Home study on 04/08/2022 showed DONALD of 17 1 with desaturations to 84%  Mallampati score 4  Neck size 18 in, BMI 39  Anderson score of 8  Symptoms:  Snoring, multiple nighttime awakenings 3-4 times  Discussed with patient regarding oral appliance versus CPAP  After discussion patient was willing to use CPAP  I also discussed in depth the risk of leaving sleep apnea untreated including hypertension, heart failure, arrhythmia, MI and stroke  Counselled patient on weight loss strategies that would be beneficial     Plan  Ordered autoPAP for patient  Follow-up 1 month after starting CPAP    History of Present Illness   HPI:  Miguelito Marshall  48-year-old male past medical history hyperlipidemia, MI status post stent, past surgical history tonsillectomy presents for evaluation for snoring and multiple nighttime awakenings  Patient is being referred by his cardiologist   His wife states that he snores regularly for the past 10 years  He wakes up at night 3-4 times  It takes him 10 minutes to 1 hour to get back to sleep  He usually wakes up because of strange dreams or to urinate  He tosses and turns all night  His wife states that he has become more irritable lately  Sleep History: Patient goes to bed at 10:30 pm and falls asleep within 15 mins, he wakes up at approximately 7am  He wakes up 3-4 times per night to urinate or due to strange dreams  It takes him 10 mins to 1 hour to get back to sleep  At present he does not use any medications to aid in sleep   He does not take any naps during the day      Denies excessive daytime sleepiness  He does fall asleep watching TV in the late evening sometimes but no daytime naps  He denies symptoms of cataplexy, sleep paralysis, hypnopompic or hypnagogic hallucinations, symptoms of restless legs, witnessed apneas, morning headaches  Patient does not use any medications as sleep aids             Review of Systems      Genitourinary none   Cardiology none   Gastrointestinal none   Neurology none   Constitutional none   Integumentary itching   Psychiatry aggressiveness or irritability   Musculoskeletal none   Pulmonary snoring   ENT none   Endocrine none   Hematological none               Historical Information   Past Medical History:   Diagnosis Date    Cardiac disease     Hypertension     Myocardial infarction [facility])      Past Surgical History:   Procedure Laterality Date    CORONARY STENT PLACEMENT      HERNIA REPAIR      OTHER SURGICAL HISTORY  09/18/2010    Arterial catheterization    AL COLONOSCOPY FLX DX W/COLLJ SPEC WHEN PFRMD N/A 3/27/2019    Procedure: COLONOSCOPY;  Surgeon: Zeb Duggan MD;  Location: [facility];  Service: Gastroenterology    TONSILLECTOMY AND ADENOIDECTOMY      TOTAL HIP ARTHROPLASTY       Family History   Problem Relation Age of Onset    Breast cancer Mother     Coronary artery disease Mother     Diabetes Mother     Hyperlipidemia Mother     Atrial fibrillation Father      Social History     Socioeconomic History    Marital status: /Civil Union     Spouse name: Not on file    Number of children: Not on file    Years of education: Not on file    Highest education level: Not on file   Occupational History    Not on file   Tobacco Use    Smoking status: Never Smoker    Smokeless tobacco: Never Used   Vaping Use    Vaping Use: Never used   Substance and Sexual Activity    Alcohol use: No    Drug use: No    Sexual activity: Not on file   Other Topics Concern    Not on file   Social History Narrative Dental care, regularly    Good dental hygiene    No caffeine use    Sun protection sunscreen     Social Determinants of Health     Financial Resource Strain: Not on file   Food Insecurity: Not on file   Transportation Needs: Not on file   Physical Activity: Not on file   Stress: Not on file   Social Connections: Not on file   Intimate Partner Violence: Not on file   Housing Stability: Not on file       Occupational History: Farmer    Meds/Allergies   Allergies   Allergen Reactions    Erythromycin Base Diarrhea    Other Other (See Comments)     HIGH DOSE ASPIRINS    Nose bleeds    Oxycodone-Acetaminophen Other (See Comments)     hallucinations    Percocet [Oxycodone-Acetaminophen] Other (See Comments)     hallucinations    Penicillins Hives and Rash       Home medications:  Prior to Admission medications    Medication Sig Start Date End Date Taking? Authorizing Provider   aspirin 81 MG tablet Take 81 mg by mouth daily  Yes Historical Provider, MD   Cholecalciferol (Vitamin D3) 1 25 MG (17123 UT) CAPS Take 1 capsule (50,000 Units total) by mouth once a week 6/18/21  Yes YUNI Wilkinson   cyclobenzaprine (FLEXERIL) 10 mg tablet Take 1 tablet (10 mg total) by mouth daily at bedtime As needed 5/9/22  Yes YUNI Wilkinson   ezetimibe (ZETIA) 10 mg tablet Take 1 tablet (10 mg total) by mouth daily 4/4/22  Yes Audrey Goetz MD   lidocaine (Lidoderm) 5 % Apply 1 patch topically in the morning   Remove & Discard patch within 12 hours or as directed by MD  5/18/22  Yes YUNI Lane   nitroglycerin (NITROSTAT) 0 4 mg SL tablet Place 1 tablet (0 4 mg total) under the tongue every 5 (five) minutes as needed for chest pain 11/1/18  Yes Lisa Sr,    ondansetron (Zofran ODT) 4 mg disintegrating tablet Take 2 tablets (8 mg total) by mouth every 8 (eight) hours as needed for nausea or vomiting 6/22/22   Audrey Goetz MD   rosuvastatin (CRESTOR) 20 MG tablet Take 1 tablet by mouth once daily 6/29/22  Yes YUNI Wilkinson   Semaglutide,0 25 or 0 5MG/DOS, (Ozempic, 0 25 or 0 5 MG/DOSE,) 2 MG/1 5ML SOPN Take 0 25 mg SQ weekly for 4 weeks then increase to 0 5 mg 6/7/22  Yes YUNI Wilkinson   Garlic Oil 5152 MG CAPS Take 2 capsules by mouth daily 1/8/13   Historical Provider, MD       Vitals:   Blood pressure 151/91, pulse 63, height 5' 9" (1 753 m), weight 120 kg (265 lb)  , Neck size 18 in Body mass index is 39 13 kg/m²  Physical Exam  General:  Awake alert and oriented x 3, conversant without conversational dyspnea, NAD, normal affect  HEENT:  PERRL, Sclera noninjected, nonicteric OU, Nares patent,  no craniofacial abnormalities, Mucous membranes, moist, no oral lesions, normal dentition, Mallampati class 4  NECK: Trachea midline, no accessory muscle use, no stridor, no cervical or supraclavicular adenopathy, JVP not elevated  CARDIAC: Reg, single s1/S2, no m/r/g  PULM: CTA bilaterally no wheezing, rhonchi or rales  ABD: Normoactive bowel sounds, soft nontender, nondistended, no rebound, no rigidity, no guarding  EXT: No cyanosis, no clubbing, no edema, normal capillary refill  NEURO: no focal neurologic deficits, AAOx3, moving all extremities appropriately    Labs: I have personally reviewed pertinent lab results    Lab Results   Component Value Date    WBC 6 44 05/21/2022    HGB 16 1 05/21/2022    HCT 48 1 05/21/2022    MCV 90 05/21/2022     05/21/2022      Lab Results   Component Value Date    GLUCOSE 112 04/02/2015    CALCIUM 9 0 05/21/2022     (L) 04/02/2015    K 4 4 05/21/2022    CO2 26 05/21/2022     05/21/2022    BUN 19 05/21/2022    CREATININE 1 31 (H) 05/21/2022     No results found for: IRON, TIBC, FERRITIN  No results found for: [id number]  No results found for: FOLATE        Sleep studies: Home study on 04/08/2022 showed DONALD of 17 1 with desaturations to 84%                                       Carla Mills MD  [facility] Fellow

## 2022-07-14 NOTE — PROGRESS NOTES
Review of Systems      Genitourinary none   Cardiology none   Gastrointestinal frequent heartburn/acid reflux   Neurology none   Constitutional none   Integumentary itching   Psychiatry none   Musculoskeletal none   Pulmonary snoring   ENT none   Endocrine none   Hematological none

## 2022-07-14 NOTE — PATIENT INSTRUCTIONS
It is very important to avoid driving while drowsy, this can be very dangerous or even cause serious injury or death  If sleepy, it is not safe to get behind the wheel  If you are driving and feels sleepy, it is very important to pull over right away  Even losing control of the car for a split second can be deadly  If you feel you cannot control when sleepiness occurs and cannot prevented, it is important to not drive at all until this improves  Please let me know if you experience this as it is very important  Nursing Support:  When: Monday through Friday 7A-5PM except holidays  Where: Our direct line is [phone]  If you are having a true emergency please call 911  In the event that the line is busy or it is after hours please leave a voice message and we will return your call  Please speak clearly, leaving your full name, birth date, best number to reach you and the reason for your call  Medication refills: We will need the name of the medication, the dosage, the ordering provider, whether you get a 30 or 90 day refill, and the pharmacy name and address  Medications will be ordered by the provider only  Nurses cannot call in prescriptions  Please allow 7 days for medication refills  Physician requested updates: If your provider requested that you call with an update after starting medication, please be ready to provide us the medication and dosage, what time you take your medication, the time you attempt to fall asleep, time you fall asleep, when you wake up, and what time you get out of bed  Sleep Study Results: We will contact you with sleep study results and/or next steps after the physician has reviewed your testing  Sleep Apnea   AMBULATORY CARE:   Sleep apnea  is a condition that causes you to stop breathing often during sleep  Types of sleep apnea:   Obstructive sleep apnea (LIZ)  is the most common kind   The muscles and tissues around your throat relax and block air from passing through  Obesity, use of alcohol or cigarettes, or a family history are common causes  LIZ may increase your risk for complications after surgery  Central sleep apnea (CSA)  means your brain does not send signals to the muscles that control breathing  You do not take a breath even though your airway is open  Common causes include medical conditions such as heart failure, being older than 40, or use of opioids  Complex (or mixed) sleep apnea  means you have both obstructive and central sleep apnea  Common signs and symptoms:   Loud snoring or long pauses in breathing    Feeling sleepy, slow, and tired during the day    Snorting, gasping, or choking while you sleep, and waking up suddenly because of these    Feeling irritable during the day    Dry mouth or a headache in the mornings    Heavy night sweating    A hard time thinking, remembering things, or focusing on your tasks the following day    Call your local emergency number (911 in the [address] [facility]) if:   You have chest pain or trouble breathing  Call your doctor if:   You have new or worsening signs or symptoms  You have questions or concerns about your condition or care  Treatment  depends on the kind of apnea you have  A mouth device  may be needed if you have mild sleep apnea  These are designed to keep your throat open  Ask your dentist or healthcare provider about the best mouth device for you  A machine  may be used to help you get more air during sleep  A mask may be placed over your nose and mouth, or just your nose  The mask is hooked to the machine  You will get air through the mask  A continuous positive airway pressure (CPAP) machine  is used to keep your airway open during sleep  The machine blows a gentle stream of air into the mask when you breathe  This helps keep your airway open so you can breathe more regularly  Extra oxygen may be given through the machine           A bilevel positive airway pressure (BiPAP) machine  gives air but lowers the pressure when you breathe out  An adaptive servo-ventilator (ASV)  is a machine that learns your usual breathing pattern  Then, it uses pressure to give you air and prevent stops in your breathing  Surgery  to expand your airway or remove extra tissues may be needed  Surgery is usually only considered if other treatments do not work  Manage or prevent sleep apnea:   Reach and maintain a healthy weight  Ask your healthcare provider what a healthy weight is for you  Ask him or her to help you create a safe weight loss plan if you are overweight  Even a small goal of a 10% weight loss can improve your symptoms  Do not smoke  Nicotine and other chemicals in cigarettes and cigars can cause lung damage  Ask your healthcare provider for information if you currently smoke and need help to quit  E-cigarettes or smokeless tobacco still contain nicotine  Talk to your healthcare provider before you use these products  Do not drink alcohol or take sedative medicine before you go to sleep  Alcohol and sedatives can relax the muscles and tissues around your throat  This can block the airflow to your lungs  Sleep on your side or use pillows designed to prevent sleep apnea  This prevents your tongue or other tissues from blocking your throat  You can also raise the head of your bed  Follow up with your doctor or specialist as directed: You may need to have blood tests during your follow-up visits  Work with your provider to find the right breathing support equipment and settings for you  Write down your questions so you remember to ask them during your visits  © Copyright [employer] 2022 Information is for End User's use only and may not be sold, redistributed or otherwise used for commercial purposes   All illustrations and images included in CareNotes® are the copyrighted property of [employer]  or Reynaldo Pyel  The above information is an  only  It is not intended as medical advice for individual conditions or treatments  Talk to your doctor, nurse or pharmacist before following any medical regimen to see if it is safe and effective for you

## 2022-07-15 ENCOUNTER — TELEPHONE (OUTPATIENT)
Dept: SLEEP CENTER | Facility: CLINIC | Age: 67
End: 2022-07-15

## 2022-07-28 ENCOUNTER — TELEPHONE (OUTPATIENT)
Dept: SLEEP CENTER | Facility: CLINIC | Age: 67
End: 2022-07-28

## 2022-07-28 NOTE — TELEPHONE ENCOUNTER
Ashtyn II, set @ 5-15cm auto, heated humidifier, standard tubing, N30i nasal mask   Set up @  miners, per script Dr Pugh Jose

## 2022-08-01 LAB
DME PARACHUTE DELIVERY DATE ACTUAL: NORMAL
DME PARACHUTE DELIVERY DATE EXPECTED: NORMAL
DME PARACHUTE DELIVERY DATE REQUESTED: NORMAL
DME PARACHUTE DELIVERY NOTE: NORMAL
DME PARACHUTE ITEM DESCRIPTION: NORMAL
DME PARACHUTE ORDER STATUS: NORMAL
DME PARACHUTE SUPPLIER NAME: NORMAL
DME PARACHUTE SUPPLIER PHONE: NORMAL

## 2022-08-16 NOTE — PROGRESS NOTES
Cardiology Follow Up    Starla Zarate [facility]  1955  [id number]  [address] [facility] [address]  [place] [address]  [phone]    1  Coronary artery disease involving native coronary artery of native heart without angina pectoris     2  Presence of drug coated stent in LAD coronary artery     3  Essential hypertension     4  Dyslipidemia     5  Obstructive sleep apnea     6  Atherosclerosis of native coronary artery without angina pectoris, unspecified whether native or transplanted heart  nitroglycerin (Nitrostat) 0 4 mg SL tablet       Discussion/Summary:  Coronary artery disease: With prior PCI to the LAD in 2010  Residual RCA/circumflex disease noted at that time  Recent nuclear stress test did not reveal any myocardial ischemia  Echocardiogram showed preserved LV systolic function without wall motion abnormalities   No further testing at this time  Continue aspirin and statin therapy     Hypertension:  BP elevated in office today - patient reports "white coat" hypertension   However patient reports normotensive readings at home and had an episode of hypotension yesterday - ? Postprandial hypotension   Patient had BP cuff checked at PCP's office and it was found to be accurate  Will not make any medication changes at this time  Advised him to call with any further episodes of lightheadedness     Dyslipidemia:  Lipids are at goal with last LDL being 61  Continue statin therapy    Obstructive sleep apnea:  Diagnosed as moderate per recent sleep study  Has been using a nasal mask x 2 weeks    Vascular screening was unremarkable  He will RTO in 6-8 months with Dr Tyron Peñaloza or sooner if necessary  He will call with any concerns      Interval History:   Bettina Burns is a 75 y o  male with coronary artery disease with PCI to the LAD in 2010 - residual RCA and circumflex disease noted at that time, hypertension, dyslipidemia, obesity, moderate obstructive sleep apnea who presents to the office today for routine follow up  After his last office visit he underwent an echocardiogram revealing preserved LV systolic function with mild pulmonary hypertension and without significant valvular abnormalities  Nuclear stress test did not reveal any ischemia  He also underwent a vascular screen which was unremarkable with the exception of very minimal abnormal plaque of the right ICA  Since his last office visit he has been feeling about the same  Functional capacity has been unchanged  He does become short of breath when he ascends a steep hill on his property  However, he is able to reach the top without having to stop  He denies any exertional chest pain/pressure/discomfort  He denies lower extremity edema, orthopnea, and PND  He was diagnosed with obstructive sleep apnea and was prescribed a nasal mask for treatment  He has been wearing this for 2 weeks and is slowly adjusting  He denies palpitations  He did have an episode of lightheadedness yesterday when turning on his tractor  He states he began to feel "weird" and he had blurry vision  This was shortly after he ate lunch  He did not pass out  A few hours later he came inside and took his blood pressure and noted it was low at 100/60  He expresses frustration over inability to lose weight      Medical Problems             Problem List     Anxiety    Atherosclerotic heart disease of native coronary artery without angina pectoris    Coronary atherosclerosis    Benign essential hypertension    Hyperlipidemia    Joint inflammation    Vitamin D deficiency    Acquired scoliosis    Carotid artery occlusion    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[address] )    Colon cancer screening    Overview Signed 3/7/2019 11:54 AM by Sharon Richey     Added automatically from request for surgery [id number]         Right wrist pain    BMI 38 0-38 9,adult    Impingement syndrome of right shoulder    COVID-19    Prediabetes    Daytime hypersomnolence    Morbid obesity with BMI of 40 0-44 9, adult (HCC)    LIZ (obstructive sleep apnea)    Medicare annual wellness visit, subsequent    Nausea              Past Medical History:   Diagnosis Date    Cardiac disease     Hypertension     Myocardial infarction [facility])      Social History     Socioeconomic History    Marital status: /Civil Union     Spouse name: Not on file    Number of children: Not on file    Years of education: Not on file    Highest education level: Not on file   Occupational History    Not on file   Tobacco Use    Smoking status: Never Smoker    Smokeless tobacco: Never Used   Vaping Use    Vaping Use: Never used   Substance and Sexual Activity    Alcohol use: No    Drug use: No    Sexual activity: Not on file   Other Topics Concern    Not on file   Social History Narrative    Dental care, regularly    Good dental hygiene    No caffeine use    Sun protection sunscreen     Social Determinants of Health     Financial Resource Strain: Not on file   Food Insecurity: Not on file   Transportation Needs: Not on file   Physical Activity: Not on file   Stress: Not on file   Social Connections: Not on file   Intimate Partner Violence: Not on file   Housing Stability: Not on file      Family History   Problem Relation Age of Onset    Breast cancer Mother     Coronary artery disease Mother     Diabetes Mother     Hyperlipidemia Mother     Atrial fibrillation Father      Past Surgical History: Procedure Laterality Date    CORONARY STENT PLACEMENT      HERNIA REPAIR      OTHER SURGICAL HISTORY  09/18/2010    Arterial catheterization    GA COLONOSCOPY FLX DX W/COLLJ SPEC WHEN PFRMD N/A 3/27/2019    Procedure: COLONOSCOPY;  Surgeon: Jyoti Chery MD;  Location: [facility];  Service: Gastroenterology    TONSILLECTOMY AND ADENOIDECTOMY      TOTAL HIP ARTHROPLASTY         Current Outpatient Medications:     aspirin 81 MG tablet, Take 81 mg by mouth daily  , Disp: , Rfl:     Cholecalciferol (Vitamin D3) 1 25 MG (01255 UT) CAPS, Take 1 capsule (50,000 Units total) by mouth once a week, Disp: 12 capsule, Rfl: 3    ezetimibe (ZETIA) 10 mg tablet, Take 1 tablet (10 mg total) by mouth daily, Disp: 90 tablet, Rfl: 3    Garlic Oil 4449 MG CAPS, Take 2 capsules by mouth daily, Disp: , Rfl:     nitroglycerin (Nitrostat) 0 4 mg SL tablet, Place 1 tablet (0 4 mg total) under the tongue every 5 (five) minutes as needed for chest pain, Disp: 25 tablet, Rfl: 3    rosuvastatin (CRESTOR) 20 MG tablet, Take 1 tablet by mouth once daily, Disp: 90 tablet, Rfl: 0    lidocaine (Lidoderm) 5 %, Apply 1 patch topically in the morning   Remove & Discard patch within 12 hours or as directed by MD  (Patient not taking: Reported on 8/17/2022), Disp: 30 patch, Rfl: 3  Allergies   Allergen Reactions    Erythromycin Base Diarrhea    Other Other (See Comments)     HIGH DOSE ASPIRINS    Nose bleeds    Oxycodone-Acetaminophen Other (See Comments)     hallucinations    Percocet [Oxycodone-Acetaminophen] Other (See Comments)     hallucinations    Penicillins Hives and Rash       Labs:     Chemistry        Component Value Date/Time     (L) 04/02/2015 1435    K 4 4 05/21/2022 1008    K 3 8 04/02/2015 1435     05/21/2022 1008    CL 98 04/02/2015 1435    CO2 26 05/21/2022 1008    CO2 26 1 04/02/2015 1435    BUN 19 05/21/2022 1008    BUN 15 04/02/2015 1435    CREATININE 1 31 (H) 05/21/2022 1008    CREATININE 1 06 04/02/2015 1435        Component Value Date/Time    CALCIUM 9 0 05/21/2022 1008    CALCIUM 8 1 (L) 04/02/2015 1435    ALKPHOS 47 05/21/2022 1008    ALKPHOS 50 04/02/2015 1435    AST 32 05/21/2022 1008    AST 37 04/02/2015 1435    ALT 33 05/21/2022 1008    ALT 45 04/02/2015 1435    BILITOT 0 5 04/02/2015 1435            Lab Results   Component Value Date    CHOL 173 03/26/2015    CHOL 199 08/11/2014     Lab Results   Component Value Date    HDL 41 05/21/2022    HDL 36 (L) 09/16/2021    HDL 41 02/17/2021     Lab Results   Component Value Date    LDLCALC 61 05/21/2022    LDLCALC 50 09/16/2021    LDLCALC 56 02/17/2021     Lab Results   Component Value Date    TRIG 91 05/21/2022    TRIG 126 09/16/2021    TRIG 160 (H) 02/17/2021     No results found for: CHOLHDL    Imaging: No results found  Review of Systems   Cardiovascular: Positive for dyspnea on exertion  All other systems reviewed and are negative  Vitals:    08/17/22 1242   BP: 149/79   Pulse: 79     Vitals:    08/17/22 1242   Weight: 120 kg (264 lb 12 8 oz)     Height: 5' 9" (175 3 cm)   Body mass index is 39 1 kg/m²  Physical Exam:  Physical Exam  Vitals reviewed  Constitutional:       General: He is not in acute distress  Appearance: He is well-developed  He is obese  He is not diaphoretic  HENT:      Head: Normocephalic and atraumatic  Eyes:      Pupils: Pupils are equal, round, and reactive to light  Neck:      Vascular: No carotid bruit  Cardiovascular:      Rate and Rhythm: Normal rate and regular rhythm  Pulses:           Radial pulses are 2+ on the right side and 2+ on the left side  Heart sounds: S1 normal and S2 normal  No murmur heard  Pulmonary:      Effort: Pulmonary effort is normal  No respiratory distress  Breath sounds: Normal breath sounds  No wheezing or rales  Abdominal:      General: There is no distension  Palpations: Abdomen is soft  Tenderness: There is no abdominal tenderness  Musculoskeletal:         General: Normal range of motion  Cervical back: Normal range of motion  Right lower leg: No edema  Left lower leg: No edema  Skin:     General: Skin is warm and dry  Findings: No erythema  Neurological:      General: No focal deficit present  Mental Status: He is alert and oriented to person, place, and time     Psychiatric:         Mood and Affect: Mood normal          Behavior: Behavior normal

## 2022-08-17 ENCOUNTER — OFFICE VISIT (OUTPATIENT)
Dept: CARDIOLOGY CLINIC | Facility: HOSPITAL | Age: 67
End: 2022-08-17
Payer: MEDICARE

## 2022-08-17 VITALS
HEIGHT: 69 IN | HEART RATE: 79 BPM | DIASTOLIC BLOOD PRESSURE: 79 MMHG | SYSTOLIC BLOOD PRESSURE: 149 MMHG | WEIGHT: 264.8 LBS | BODY MASS INDEX: 39.22 KG/M2

## 2022-08-17 DIAGNOSIS — G47.33 OBSTRUCTIVE SLEEP APNEA: ICD-10-CM

## 2022-08-17 DIAGNOSIS — I10 ESSENTIAL HYPERTENSION: ICD-10-CM

## 2022-08-17 DIAGNOSIS — E78.5 DYSLIPIDEMIA: ICD-10-CM

## 2022-08-17 DIAGNOSIS — Z95.5 PRESENCE OF DRUG COATED STENT IN LAD CORONARY ARTERY: ICD-10-CM

## 2022-08-17 DIAGNOSIS — I25.10 ATHEROSCLEROSIS OF NATIVE CORONARY ARTERY WITHOUT ANGINA PECTORIS, UNSPECIFIED WHETHER NATIVE OR TRANSPLANTED HEART: ICD-10-CM

## 2022-08-17 DIAGNOSIS — I25.10 CORONARY ARTERY DISEASE INVOLVING NATIVE CORONARY ARTERY OF NATIVE HEART WITHOUT ANGINA PECTORIS: Primary | ICD-10-CM

## 2022-08-17 PROCEDURE — 99214 OFFICE O/P EST MOD 30 MIN: CPT | Performed by: PHYSICIAN ASSISTANT

## 2022-08-17 RX ORDER — NITROGLYCERIN 0.4 MG/1
0.4 TABLET SUBLINGUAL
Qty: 25 TABLET | Refills: 3 | Status: SHIPPED | OUTPATIENT
Start: 2022-08-17

## 2022-10-11 PROBLEM — Z00.00 MEDICARE ANNUAL WELLNESS VISIT, SUBSEQUENT: Status: RESOLVED | Noted: 2022-05-18 | Resolved: 2022-10-11

## 2022-10-12 DIAGNOSIS — E78.2 MIXED HYPERLIPIDEMIA: ICD-10-CM

## 2022-10-12 RX ORDER — ROSUVASTATIN CALCIUM 20 MG/1
20 TABLET, COATED ORAL DAILY
Qty: 90 TABLET | Refills: 0 | Status: SHIPPED | OUTPATIENT
Start: 2022-10-12

## 2022-10-17 ENCOUNTER — OFFICE VISIT (OUTPATIENT)
Dept: SLEEP CENTER | Facility: CLINIC | Age: 67
End: 2022-10-17
Payer: MEDICARE

## 2022-10-17 VITALS
WEIGHT: 270 LBS | SYSTOLIC BLOOD PRESSURE: 157 MMHG | BODY MASS INDEX: 39.99 KG/M2 | HEART RATE: 71 BPM | HEIGHT: 69 IN | DIASTOLIC BLOOD PRESSURE: 90 MMHG

## 2022-10-17 DIAGNOSIS — G47.33 OSA (OBSTRUCTIVE SLEEP APNEA): Primary | ICD-10-CM

## 2022-10-17 PROCEDURE — 99213 OFFICE O/P EST LOW 20 MIN: CPT | Performed by: PSYCHIATRY & NEUROLOGY

## 2022-10-17 NOTE — PATIENT INSTRUCTIONS
Each night, a few hours before bed, try to write down your thoughts and worries  The goal is to clear your mind so you have less to think about in the middle of the night       With great difficulty falling asleep or with difficulty falling back asleep, laying in bed for a prolonged period time is not ideal   This can increase worry and rumination (having racing thoughts, not able to "turn off your brain"  After what feels like 15 minutes, if you feel wide awake, worried, anxious, or can't clear your brain, it is better to leave the bedroom to do something relaxing , The typical recommendation is to read a boring book in dim light  In general, if you leave the room, you want to do something that is relaxing, not stimulating  Avoid bright screens, doing work, doing chores, or anything that requires a lot of mental effort  Return to bed when you feel more calm, sleepy, or mentally clear  I would recommend seeing weight management for their guidance

## 2022-10-17 NOTE — PROGRESS NOTES
Assessment/Plan:      1  LIZ (obstructive sleep apnea)   Mr Gay Mckeon is doing fairly well with PAP- his AHI is normal, treatment is beneficial and effective  We discussed it is dose dependent, with more usage, there is potentially more benefit  There has been modest benefit to date as he has not been snoring with PAP    We discussed that when he is prone to rumination, he should consider planned worry time and leave the room when needed  Subjective:      Patient ID: Joseph Laguerre is a 75 y o  male  This is a 70-year-old male who returns in follow-up for history of moderate obstructive sleep apnea, respiratory event index 17  I initially assessed him in July of this year and ordered auto-CPAP at that visit  He returns today in follow-up after starting treatment at home  He reports using his machine every night for 5-6 hours per night  He is accompanied by his wife, she notes he does not snore  He has not noticed much benefit otherwise  He has a few nights where he awakens and cannot return to sleep, ruminates a lot    With PAP no dry mouth, no nasal congestion, no nose bleeds  No aerophagia  PAP pressure is comfortable     Machine data  Ashtyn II auto-PAP set at 5-15 cm h20  Uses a nasal mask   P95- 8 5 cm h20  AHI 1 6  Average session 5 hr      He reports a bedtime of 10-10:30 p m , is asleep in 15-20 minutes, ultimately is awake at 5:30 a m -6:00 a m , sleeping 7 hours per night  [place] Sleepiness Scale score is 6  Does not nap  Does doze  [place] Sleepiness Scale:     Sitting and reading: Would never doze  Watching TV: Moderate chance of dozing  Sitting, inactive in a public place (e g  a theatre or a meeting):  Would never doze  As a passenger in a car for an hour without a break: Would never doze  Lying down to rest in the afternoon when circumstances permit: Moderate chance of dozing  Sitting and talking to someone: Would never doze  Sitting quietly after a lunch without alcohol: Moderate chance of dozing  In a car, while stopped for a few minutes in traffic: Would never doze  Total score: 6     The following portions of the patient's history were reviewed and updated as appropriate: allergies, current medications, past family history, past medical history, past social history, past surgical history, and problem list     Review of Systems    Genitourinary none   Cardiology none   Gastrointestinal none   Neurology none   Constitutional none   Integumentary none   Psychiatry none   Musculoskeletal none   Pulmonary snoring   ENT none   Endocrine none   Hematological none        Objective:        /90 (BP Location: Left arm, Patient Position: Sitting, Cuff Size: Large)   Pulse 71   Ht 5' 9" (1 753 m)   Wt 122 kg (270 lb)   BMI 39 87 kg/m²     Physical Exam   RRR  Lungs CTA b/l  No edema  mallampati 4 airway

## 2022-10-28 DIAGNOSIS — E55.9 VITAMIN D DEFICIENCY: ICD-10-CM

## 2022-10-28 RX ORDER — CHOLECALCIFEROL (VITAMIN D3) 1250 MCG
1 CAPSULE ORAL WEEKLY
Qty: 12 CAPSULE | Refills: 0 | Status: SHIPPED | OUTPATIENT
Start: 2022-10-28

## 2022-11-29 ENCOUNTER — RA CDI HCC (OUTPATIENT)
Dept: OTHER | Facility: HOSPITAL | Age: 67
End: 2022-11-29

## 2022-11-29 NOTE — PROGRESS NOTES
[address]  coding opportunities       Chart reviewed, no opportunity found: CHART REVIEWED, NO OPPORTUNITY FOUND        Patients Insurance     Medicare Insurance: Medicare

## 2022-12-01 DIAGNOSIS — I10 BENIGN ESSENTIAL HYPERTENSION: ICD-10-CM

## 2022-12-01 DIAGNOSIS — E78.00 PURE HYPERCHOLESTEROLEMIA: ICD-10-CM

## 2022-12-01 DIAGNOSIS — R73.03 PREDIABETES: ICD-10-CM

## 2022-12-01 DIAGNOSIS — E78.5 DYSLIPIDEMIA: Primary | ICD-10-CM

## 2022-12-02 ENCOUNTER — APPOINTMENT (OUTPATIENT)
Dept: LAB | Facility: MEDICAL CENTER | Age: 67
End: 2022-12-02

## 2022-12-02 DIAGNOSIS — I10 BENIGN ESSENTIAL HYPERTENSION: ICD-10-CM

## 2022-12-02 DIAGNOSIS — E78.5 DYSLIPIDEMIA: ICD-10-CM

## 2022-12-02 DIAGNOSIS — R73.03 PREDIABETES: ICD-10-CM

## 2022-12-02 DIAGNOSIS — E78.00 PURE HYPERCHOLESTEROLEMIA: ICD-10-CM

## 2022-12-02 LAB
ALBUMIN SERPL BCP-MCNC: 3.4 G/DL (ref 3.5–5)
ALP SERPL-CCNC: 39 U/L (ref 46–116)
ALT SERPL W P-5'-P-CCNC: 28 U/L (ref 12–78)
ANION GAP SERPL CALCULATED.3IONS-SCNC: 4 MMOL/L (ref 4–13)
AST SERPL W P-5'-P-CCNC: 33 U/L (ref 5–45)
BASOPHILS # BLD AUTO: 0.02 THOUSANDS/ÂΜL (ref 0–0.1)
BASOPHILS NFR BLD AUTO: 0 % (ref 0–1)
BILIRUB SERPL-MCNC: 0.85 MG/DL (ref 0.2–1)
BUN SERPL-MCNC: 25 MG/DL (ref 5–25)
CALCIUM ALBUM COR SERPL-MCNC: 9.4 MG/DL (ref 8.3–10.1)
CALCIUM SERPL-MCNC: 8.9 MG/DL (ref 8.3–10.1)
CHLORIDE SERPL-SCNC: 108 MMOL/L (ref 96–108)
CHOLEST SERPL-MCNC: 117 MG/DL
CO2 SERPL-SCNC: 24 MMOL/L (ref 21–32)
CREAT SERPL-MCNC: 1.35 MG/DL (ref 0.6–1.3)
EOSINOPHIL # BLD AUTO: 0.33 THOUSAND/ÂΜL (ref 0–0.61)
EOSINOPHIL NFR BLD AUTO: 5 % (ref 0–6)
ERYTHROCYTE [DISTWIDTH] IN BLOOD BY AUTOMATED COUNT: 13.3 % (ref 11.6–15.1)
GFR SERPL CREATININE-BSD FRML MDRD: 53 ML/MIN/1.73SQ M
GLUCOSE P FAST SERPL-MCNC: 111 MG/DL (ref 65–99)
HCT VFR BLD AUTO: 48.7 % (ref 36.5–49.3)
HDLC SERPL-MCNC: 39 MG/DL
HGB BLD-MCNC: 15.7 G/DL (ref 12–17)
IMM GRANULOCYTES # BLD AUTO: 0.01 THOUSAND/UL (ref 0–0.2)
IMM GRANULOCYTES NFR BLD AUTO: 0 % (ref 0–2)
LDLC SERPL CALC-MCNC: 51 MG/DL (ref 0–100)
LYMPHOCYTES # BLD AUTO: 2.59 THOUSANDS/ÂΜL (ref 0.6–4.47)
LYMPHOCYTES NFR BLD AUTO: 41 % (ref 14–44)
MCH RBC QN AUTO: 28.6 PG (ref 26.8–34.3)
MCHC RBC AUTO-ENTMCNC: 32.2 G/DL (ref 31.4–37.4)
MCV RBC AUTO: 89 FL (ref 82–98)
MONOCYTES # BLD AUTO: 0.55 THOUSAND/ÂΜL (ref 0.17–1.22)
MONOCYTES NFR BLD AUTO: 9 % (ref 4–12)
NEUTROPHILS # BLD AUTO: 2.76 THOUSANDS/ÂΜL (ref 1.85–7.62)
NEUTS SEG NFR BLD AUTO: 45 % (ref 43–75)
NONHDLC SERPL-MCNC: 78 MG/DL
NRBC BLD AUTO-RTO: 0 /100 WBCS
PLATELET # BLD AUTO: 220 THOUSANDS/UL (ref 149–390)
PMV BLD AUTO: 11.4 FL (ref 8.9–12.7)
POTASSIUM SERPL-SCNC: 4.4 MMOL/L (ref 3.5–5.3)
PROT SERPL-MCNC: 6.8 G/DL (ref 6.4–8.4)
RBC # BLD AUTO: 5.48 MILLION/UL (ref 3.88–5.62)
SODIUM SERPL-SCNC: 136 MMOL/L (ref 135–147)
TRIGL SERPL-MCNC: 134 MG/DL
TSH SERPL DL<=0.05 MIU/L-ACNC: 3.47 UIU/ML (ref 0.45–4.5)
WBC # BLD AUTO: 6.26 THOUSAND/UL (ref 4.31–10.16)

## 2022-12-03 LAB
EST. AVERAGE GLUCOSE BLD GHB EST-MCNC: 128 MG/DL
HBA1C MFR BLD: 6.1 %

## 2022-12-08 ENCOUNTER — OFFICE VISIT (OUTPATIENT)
Dept: INTERNAL MEDICINE CLINIC | Facility: CLINIC | Age: 67
End: 2022-12-08

## 2022-12-08 ENCOUNTER — APPOINTMENT (OUTPATIENT)
Dept: RADIOLOGY | Facility: MEDICAL CENTER | Age: 67
End: 2022-12-08

## 2022-12-08 VITALS
SYSTOLIC BLOOD PRESSURE: 128 MMHG | DIASTOLIC BLOOD PRESSURE: 82 MMHG | WEIGHT: 264.7 LBS | OXYGEN SATURATION: 97 % | BODY MASS INDEX: 39.21 KG/M2 | HEART RATE: 95 BPM | HEIGHT: 69 IN

## 2022-12-08 DIAGNOSIS — E78.2 MIXED HYPERLIPIDEMIA: ICD-10-CM

## 2022-12-08 DIAGNOSIS — M79.672 PAIN OF LEFT HEEL: ICD-10-CM

## 2022-12-08 DIAGNOSIS — I10 BENIGN ESSENTIAL HYPERTENSION: ICD-10-CM

## 2022-12-08 DIAGNOSIS — G47.33 OSA (OBSTRUCTIVE SLEEP APNEA): Primary | ICD-10-CM

## 2022-12-08 DIAGNOSIS — L29.9 ITCHY SKIN: ICD-10-CM

## 2022-12-08 DIAGNOSIS — F41.9 ANXIETY: ICD-10-CM

## 2022-12-08 DIAGNOSIS — E78.5 DYSLIPIDEMIA: ICD-10-CM

## 2022-12-08 DIAGNOSIS — I25.10 ATHEROSCLEROSIS OF NATIVE CORONARY ARTERY OF NATIVE HEART WITHOUT ANGINA PECTORIS: ICD-10-CM

## 2022-12-08 DIAGNOSIS — Z23 NEED FOR INFLUENZA VACCINATION: ICD-10-CM

## 2022-12-08 DIAGNOSIS — I25.119 ATHEROSCLEROSIS OF CORONARY ARTERY WITH ANGINA PECTORIS, UNSPECIFIED VESSEL OR LESION TYPE, UNSPECIFIED WHETHER NATIVE OR TRANSPLANTED HEART (HCC): ICD-10-CM

## 2022-12-08 DIAGNOSIS — R73.03 PREDIABETES: ICD-10-CM

## 2022-12-08 DIAGNOSIS — E55.9 VITAMIN D DEFICIENCY: ICD-10-CM

## 2022-12-08 RX ORDER — ROSUVASTATIN CALCIUM 20 MG/1
20 TABLET, COATED ORAL DAILY
Qty: 90 TABLET | Refills: 0 | Status: SHIPPED | OUTPATIENT
Start: 2022-12-08

## 2022-12-08 RX ORDER — CETIRIZINE HYDROCHLORIDE 10 MG/1
10 TABLET ORAL DAILY
Qty: 30 TABLET | Refills: 3 | Status: SHIPPED | OUTPATIENT
Start: 2022-12-08

## 2022-12-08 RX ORDER — ESCITALOPRAM OXALATE 10 MG/1
10 TABLET ORAL DAILY
Qty: 30 TABLET | Refills: 5 | Status: SHIPPED | OUTPATIENT
Start: 2022-12-08

## 2022-12-08 NOTE — PROGRESS NOTES
Name: Lv Fox      : 1955      MRN: [id number]  Encounter Provider: YUNI Fenton  Encounter Date: 2022   Encounter department: [address] [id number] Did review labs will continue to watch diet  Will give Flu vaccine  Will order an XR of the heel  Will call in Zyrtec 10 mg daily  Continues to see Cardiology  Will follow up in 4 months or sooner if need be  1  LIZ (obstructive sleep apnea)  -     Comprehensive metabolic panel; Future  -     CBC and differential; Future  -     TSH, 3rd generation with Free T4 reflex; Future    2  Atherosclerosis of native coronary artery of native heart without angina pectoris  -     Comprehensive metabolic panel; Future  -     CBC and differential; Future  -     TSH, 3rd generation with Free T4 reflex; Future    3  Atherosclerosis of coronary artery with angina pectoris, unspecified vessel or lesion type, unspecified whether native or transplanted heart ([facility] )  -     Comprehensive metabolic panel; Future  -     CBC and differential; Future  -     TSH, 3rd generation with Free T4 reflex; Future    4  Benign essential hypertension  -     Comprehensive metabolic panel; Future  -     CBC and differential; Future  -     TSH, 3rd generation with Free T4 reflex; Future    5  Dyslipidemia  -     Comprehensive metabolic panel; Future  -     CBC and differential; Future  -     TSH, 3rd generation with Free T4 reflex; Future  -     Lipid panel; Future    6  Vitamin D deficiency  -     Comprehensive metabolic panel; Future  -     CBC and differential; Future  -     TSH, 3rd generation with Free T4 reflex; Future    7  Prediabetes  -     Comprehensive metabolic panel; Future  -     CBC and differential; Future  -     TSH, 3rd generation with Free T4 reflex; Future  -     HEMOGLOBIN A1C W/ EAG ESTIMATION; Future    8  Pain of left heel  -     XR heel / calcaneus 2+ vw left;  Future; Expected date: 2022  - Comprehensive metabolic panel; Future  -     CBC and differential; Future  -     TSH, 3rd generation with Free T4 reflex; Future    9  Anxiety  -     escitalopram (Lexapro) 10 mg tablet; Take 1 tablet (10 mg total) by mouth daily    10  Mixed hyperlipidemia  -     rosuvastatin (CRESTOR) 20 MG tablet; Take 1 tablet (20 mg total) by mouth daily    11  Itchy skin  -     cetirizine (ZyrTEC) 10 mg tablet; Take 1 tablet (10 mg total) by mouth daily    12  Need for influenza vaccination  -     influenza vaccine, high-dose, PF 0 7 mL (FLUZONE HIGH-DOSE)           Crystal Peñaloza is for a follow up visit  He is doing well but is having some left heel pain  He was to see a doctor in the past and was told he is flat footed  He did not use the inserts due to it being uncomfortable  He has not seen Cardiology yet for a follow up but will make one in the new year  He is not having any issues  He is still having itchiness and at times hives since having covid  He has tried numerous creams but nothing is really helping  He would like to try and go on something for his mood  He was on Lexapro in the past which did help  is willing to get a flu vaccine  He offers no other issue  Review of Systems   Musculoskeletal:        Heel pain   All other systems reviewed and are negative  Below is the patient's most recent value for Albumin, ALT, AST, BUN, Calcium, Chloride, Cholesterol, CO2, Creatinine, GFR, Glucose, HDL, Hematocrit, Hemoglobin, Hemoglobin A1C, LDL, Magnesium, Phosphorus, Platelets, Potassium, PSA, Sodium, Triglycerides, and WBC     Lab Results   Component Value Date    ALT 28 12/02/2022    AST 33 12/02/2022    BUN 25 12/02/2022    CALCIUM 8 9 12/02/2022     12/02/2022    CHOL 173 03/26/2015    CO2 24 12/02/2022    CREATININE 1 35 (H) 12/02/2022    HDL 39 (L) 12/02/2022    HCT 48 7 12/02/2022    HGB 15 7 12/02/2022    HGBA1C 6 1 (H) 12/02/2022    MG 2 3 03/19/2018     12/02/2022    K 4 4 12/02/2022 PSA 0 8 05/21/2022     (L) 04/02/2015    TRIG 134 12/02/2022    WBC 6 26 12/02/2022     Note: for a comprehensive list of the patient's lab results, access the Results Review activity  Current Outpatient Medications on File Prior to Visit   Medication Sig   • aspirin 81 MG tablet Take 81 mg by mouth daily  • Cholecalciferol (Vitamin D3) 1 25 MG (60140 UT) CAPS Take 1 capsule (50,000 Units total) by mouth once a week   • ezetimibe (ZETIA) 10 mg tablet Take 1 tablet (10 mg total) by mouth daily   • Garlic Oil 0881 MG CAPS Take 2 capsules by mouth daily   • nitroglycerin (Nitrostat) 0 4 mg SL tablet Place 1 tablet (0 4 mg total) under the tongue every 5 (five) minutes as needed for chest pain   • [DISCONTINUED] rosuvastatin (CRESTOR) 20 MG tablet Take 1 tablet (20 mg total) by mouth daily   • lidocaine (Lidoderm) 5 % Apply 1 patch topically in the morning  Remove & Discard patch within 12 hours or as directed by MD  (Patient not taking: No sig reported)       Objective     /82   Pulse 95   Ht 5' 9" (1 753 m)   Wt 120 kg (264 lb 11 2 oz)   SpO2 97%   BMI 39 09 kg/m²     Physical Exam  Vitals reviewed  Constitutional:       Appearance: Normal appearance  He is obese  HENT:      Head: Normocephalic and atraumatic  Right Ear: Tympanic membrane, ear canal and external ear normal       Left Ear: Tympanic membrane, ear canal and external ear normal       Nose: Nose normal       Mouth/Throat:      Mouth: Mucous membranes are moist       Pharynx: Oropharynx is clear  Eyes:      Extraocular Movements: Extraocular movements intact  Conjunctiva/sclera: Conjunctivae normal       Pupils: Pupils are equal, round, and reactive to light  Cardiovascular:      Rate and Rhythm: Normal rate and regular rhythm  Pulses: Normal pulses  Heart sounds: Normal heart sounds  Pulmonary:      Effort: Pulmonary effort is normal       Breath sounds: Normal breath sounds     Abdominal:      General: Abdomen is flat  Bowel sounds are normal       Palpations: Abdomen is soft  Musculoskeletal:         General: Normal range of motion  Cervical back: Normal range of motion and neck supple  Skin:     General: Skin is warm and dry  Capillary Refill: Capillary refill takes less than 2 seconds  Neurological:      General: No focal deficit present  Mental Status: He is alert and oriented to person, place, and time  Mental status is at baseline  Psychiatric:         Mood and Affect: Mood normal          Behavior: Behavior normal          Thought Content:  Thought content normal          Judgment: Judgment normal        Ann 1690, CRNP

## 2023-03-16 NOTE — PROGRESS NOTES
Assessment/Plan:    Class 2 obesity  -Discussed options of [facility]-Intensive Lifestyle Intervention Program, Very Low Calorie Diet-VLCD, Conservative Program, Heena-En-Y Gastric Bypass and Vertical Sleeve Gastrectomy and the role of weight loss medications   -Initial weight loss goal of 5-10% weight loss for improved health  -AVOID phentermine/Qsymia: cardiac hx  -Screening labs: CMP, A1c, TSH, and LP reviewed from 12/2/22  -Patient is interested in pursuing Conservative Program   -Denies hx of glaucoma and kidney stones  -+evening cravings  He is interested in Topamax  Will review labs once completed  -Calorie goals, sample menu, portion size guidelines, and food logging reviewed with the patient  Try adding evening exercise  Prediabetes  -A1c 6 1 %  -may improve with weight loss and dietary changes  -did not tolerate Ozempic  -can consider metformin    Follow up in approximately 3 months with Non-Surgical Physician/Advanced Practitioner  Goals:  Food log (ie ) www myfitnesspal com,sparkpeople  com,loseit com,calorieking  com,etc  baritastic  No sugary beverages  At least 64oz of water daily  Increase physical activity by 10 minutes daily   Gradually increase physical activity to a goal of 5 days per week for 30 minutes of MODERATE intensity PLUS 2 days per week of FULL BODY resistance training  5-10 servings of fruits and vegetables per day and 25-35 grams of dietary fiber per day, gradually increasing  Measure all portions: creamers, sugars, cooking oils/butters, condiments, dressings, etc and log calories   If choosing starch pick whole grain/complex carbs and keep to 1/2 cup: oatmeal, brown rice, quinoa, whole wheat pasta, potatoes, sweet potato, chickpea noodles, red lentil noodles  6475-5197 calories    Diagnoses and all orders for this visit:    Class 2 obesity    Prediabetes    Body mass index 39 0-39 9, adult        Subjective:   Chief Complaint   Patient presents with   • Consult     Already uses a cpap machine  Patient ID: Richard Leon  is a 75 y o  male with excess weight/obesity here to pursue weight management  Past Medical History:   Diagnosis Date   • Cardiac disease    • Hypertension    • Myocardial infarction ([address] )      HPI:  Obesity/Excess Weight:  Severity: Severe  Onset:  Worse in the last 3-4 years, lost some weight after MI in 2010 and has been regaining since this loss    Modifiers: Diet and Exercise and Ozempic- no weight loss and constipation  Contributing factors: Stress/Emotional Eating  Associated symptoms: comorbid conditions and fatigue    Goals: lose 30 lbs  Hydration: 2 glasses of milk otherwise water, soda once per week  Alcohol:  Not usually  Exercise: active    B:2 slices toast with butter OR leftovers  S: banana or none  L: leftovers  S: hot chocolate or chocolate or nothing  D: protein + veg + potato  S: sweets or bread    The following portions of the patient's history were reviewed and updated as appropriate: allergies, current medications, past family history, past medical history, past social history, past surgical history and problem list     Review of Systems   Constitutional: Negative for chills and fever  HENT: Negative for sore throat  Respiratory: Negative for cough and shortness of breath  Cardiovascular: Negative for chest pain and palpitations  Gastrointestinal: Negative for constipation and diarrhea  Genitourinary: Negative for dysuria  Musculoskeletal: Negative for arthralgias  Skin: Positive for rash (managed by PCP)  Neurological: Negative for headaches  Objective:    /80 (BP Location: Left arm, Cuff Size: Large)   Pulse 76   Temp (!) 97 °F (36 1 °C) (Temporal)   Ht 5' 9" (1 753 m)   Wt 122 kg (269 lb 3 2 oz)   SpO2 95%   BMI 39 75 kg/m²     Physical Exam  Vitals and nursing note reviewed  Constitutional   General appearance: Abnormal   well developed and obese      Pulmonary   Respiratory effort: No increased work of breathing or signs of respiratory distress  Abdomen   Abdomen: Abnormal   The abdomen was obese  Musculoskeletal   Gait and station: Normal     Psychiatric   Orientation to person, place and time: Normal     Affect: appropriate    45 minute visit, >50% time spent counseling patient on surgical and nonsurgical interventions for the treatment of excess weight  Discussed in detail nonsurgical options including intensive lifestyle intervention program, very low-calorie diet program and conservative program   Discussed the role of weight loss medications    Counseled patient on diet behavior and exercise modification for weight loss

## 2023-03-20 ENCOUNTER — OFFICE VISIT (OUTPATIENT)
Dept: BARIATRICS | Facility: CLINIC | Age: 68
End: 2023-03-20

## 2023-03-20 VITALS
BODY MASS INDEX: 39.87 KG/M2 | OXYGEN SATURATION: 95 % | HEART RATE: 76 BPM | HEIGHT: 69 IN | SYSTOLIC BLOOD PRESSURE: 136 MMHG | DIASTOLIC BLOOD PRESSURE: 80 MMHG | TEMPERATURE: 97 F | WEIGHT: 269.2 LBS

## 2023-03-20 DIAGNOSIS — E66.9 CLASS 2 OBESITY: Primary | ICD-10-CM

## 2023-03-20 DIAGNOSIS — R73.03 PREDIABETES: ICD-10-CM

## 2023-03-20 PROBLEM — E66.812 CLASS 2 OBESITY: Status: ACTIVE | Noted: 2022-02-02

## 2023-03-20 NOTE — ASSESSMENT & PLAN NOTE
-A1c 6 1 %  -may improve with weight loss and dietary changes  -did not tolerate Ozempic  -can consider metformin

## 2023-03-20 NOTE — PATIENT INSTRUCTIONS
Goals: Food log (ie ) www myfitnesspal com,sparkpeople  com,loseit com,calorieking  com,etc  baritastic  No sugary beverages  At least 64oz of water daily  Increase physical activity by 10 minutes daily   Gradually increase physical activity to a goal of 5 days per week for 30 minutes of MODERATE intensity PLUS 2 days per week of FULL BODY resistance training  5-10 servings of fruits and vegetables per day and 25-35 grams of dietary fiber per day, gradually increasing  Measure all portions: creamers, sugars, cooking oils/butters, condiments, dressings, etc and log calories   If choosing starch pick whole grain/complex carbs and keep to 1/2 cup: oatmeal, brown rice, quinoa, whole wheat pasta, potatoes, sweet potato, chickpea noodles, red lentil noodles  1398-3858 calories  Slow meal times  Add fruit/veg to snacks

## 2023-03-20 NOTE — ASSESSMENT & PLAN NOTE
-Discussed options of [facility]-Intensive Lifestyle Intervention Program, Very Low Calorie Diet-VLCD, Conservative Program, Heena-En-Y Gastric Bypass and Vertical Sleeve Gastrectomy and the role of weight loss medications   -Initial weight loss goal of 5-10% weight loss for improved health  -AVOID phentermine/Qsymia: cardiac hx  -Screening labs: CMP, A1c, TSH, and LP reviewed from 12/2/22  -Patient is interested in pursuing Conservative Program   -Denies hx of glaucoma and kidney stones  -+evening cravings  He is interested in Topamax  Will review labs once completed  -Calorie goals, sample menu, portion size guidelines, and food logging reviewed with the patient  Try adding evening exercise

## 2023-03-21 ENCOUNTER — OFFICE VISIT (OUTPATIENT)
Dept: SLEEP CENTER | Facility: CLINIC | Age: 68
End: 2023-03-21

## 2023-03-21 VITALS
HEART RATE: 63 BPM | SYSTOLIC BLOOD PRESSURE: 144 MMHG | DIASTOLIC BLOOD PRESSURE: 93 MMHG | HEIGHT: 69 IN | BODY MASS INDEX: 39.93 KG/M2 | OXYGEN SATURATION: 99 % | WEIGHT: 269.6 LBS

## 2023-03-21 DIAGNOSIS — G47.33 OSA (OBSTRUCTIVE SLEEP APNEA): Primary | ICD-10-CM

## 2023-03-21 NOTE — PROGRESS NOTES
Progress Note - [address] 75 y o  male   :1955, MRN: [id number]  3/21/2023          Follow Up Evaluation / Problem:     Obstructive Sleep Apnea      Thank you for the opportunity of participating in the evaluation and care of this patient in the Sleep Clinic at [facility]  HPI: Ginny Nails is a 75y o  year old male  The patient presents for follow up of moderate obstructive sleep apnea  He had a home sleep study on 2022 with an DONALD of 17  He was then set up with an auto CPAP on 2022  He presents to review compliance and effectiveness of treatment  Current Outpatient Medications:   •  aspirin 81 MG tablet, Take 81 mg by mouth daily  , Disp: , Rfl:   •  Cholecalciferol (Vitamin D3) 1 25 MG (61090 UT) CAPS, Take 1 capsule (50,000 Units total) by mouth once a week, Disp: 12 capsule, Rfl: 0  •  escitalopram (Lexapro) 10 mg tablet, Take 1 tablet (10 mg total) by mouth daily, Disp: 30 tablet, Rfl: 5  •  ezetimibe (ZETIA) 10 mg tablet, Take 1 tablet (10 mg total) by mouth daily, Disp: 90 tablet, Rfl: 3  •  Garlic Oil 4602 MG CAPS, Take 2 capsules by mouth daily, Disp: , Rfl:   •  nitroglycerin (Nitrostat) 0 4 mg SL tablet, Place 1 tablet (0 4 mg total) under the tongue every 5 (five) minutes as needed for chest pain, Disp: 25 tablet, Rfl: 3  •  rosuvastatin (CRESTOR) 20 MG tablet, Take 1 tablet (20 mg total) by mouth daily, Disp: 90 tablet, Rfl: 0    [place] Sleepiness Scale  Sitting and reading: Slight chance of dozing  Watching TV: Slight chance of dozing  Sitting, inactive in a public place (e g  a theatre or a meeting):  Would never doze  As a passenger in a car for an hour without a break: Would never doze  Lying down to rest in the afternoon when circumstances permit: Moderate chance of dozing  Sitting and talking to someone: Would never doze  Sitting quietly after a lunch without alcohol: Slight chance of dozing  In a car, while stopped for a few minutes in traffic: Would never doze  Total score: 5              Vitals:    03/21/23 1324   BP: 144/93   BP Location: Left arm   Patient Position: Sitting   Cuff Size: Standard   Pulse: 63   SpO2: 99%   Weight: 122 kg (269 lb 9 6 oz)   Height: 5' 9" (1 753 m)       Body mass index is 39 81 kg/m²  EPWORTH SLEEPINESS SCORE  Total score: 5      Past History Since Last Sleep Center Visit:     Patient states his CPAP treatment is approximately the same as his last visit in October  He is not snoring, but otherwise has not noted a significant benefit using CPAP  He uses a CPAP approximately 5 to 6 hours per night  He will then remove his CPAP around 3 AM and sleep until 5 or 5:30 AM   He feels at times he is having difficulty in laying on his side due to the type of mask that he has  He feels it causes leakage  The patient reports that he cleans the equipment appropriately and changes supplies on a regular basis      The review of systems and following portions of the patient's history were reviewed and updated as appropriate: allergies, current medications, past family history, past medical history, past social history, past surgical history, and problem list         OBJECTIVE  Equipment set up date: 7/28/2022  PAP Pressure: Nasal AutoPAP using a lower limit of 5 cm and an upper limit of 15 cm of water pressure  Type of mask used: nasal cradle  DME Provider: Homer Sosa  Denies aerophagia or AM headaches    Physical Exam:     General Appearance:   Alert, cooperative, no distress, appears stated age, obese                           Lungs:      Clear to auscultation bilaterally, respirations unlabored     Heart:   Regular rate and rhythm, S1 and S2 normal, no murmur, rub or gallop         ASSESSMENT / PLAN    1  LIZ (obstructive sleep apnea)  Assessment & Plan:  Patient was diagnosed with moderate obstructive sleep apnea on a home sleep study performed on 4/7/2022  His DONALD was noted to be 17  He has been using auto CPAP since 7/28/2022  He notes himself not to be snoring but does not feel he is has any other benefits such as decreased daytime sleepiness  His AHI was noted to be 5 1 today  I ordered a mask fitting as he feels his mask is leaking at times  I also ordered a CPAP study to further evaluate his sleep apnea in the sleep lab  We can make any adjustments based on the CPAP study  In the meantime, he will continue to use his CPAP nightly  Orders:  -     Mask fitting only; Future  -     CPAP Study; Future; Expected date: 03/21/2023           Counseling / Coordination of Care  I have spent a total time of 40 minutes on 03/21/23 in caring for this patient including Diagnostic results, Risks and benefits of tx options, Instructions for management, Patient and family education, Importance of tx compliance, Risk factor reductions, Impressions, Documenting in the medical record, Reviewing / ordering tests, medicine, procedures   and Obtaining or reviewing history    Today I reviewed the patient's compliance data  he has been able to use the equipment 100% of all days recorded  Average usage was 4 or more hours 90% of all days recorded  The patient uses the equipment for an average of 5 hours and 48 minutes per night  The estimated AHI is 5 1   abnormal breathing events per hour  4 of which are central in nature  The patient feels they benefit from the use of PAP equipment and would like to continue PAP therapy  He will continue using this equipment at the settings noted above until his CPAP study is completed and further recommendations can be made  At that timehe will then return for a routine follow-up evaluation  I have asked the patient to contact the [facility] [address] if he encounters any difficulties prior to that time      The following instructions have been given to the patient today:    Patient Instructions As you appear to be having some central sleep apnea events while using CPAP, I would recommend getting a CPAP study done as soon as possible  We can make recommendations based off that study  As I know you live close to our [place] office, you could schedule with Dr Jayna Reyna for follow-up after your CPAP study  If you have any questions or concerns prior to that please call our office    Nursing Support:  When: Monday through Friday 7A-5PM except holidays  Where: Our direct line is [phone]  If you are having a true emergency please call 911  In the event that the line is busy or it is after hours please leave a voice message and we will return your call  Please speak clearly, leaving your full name, birth date, best number to reach you and the reason for your call  Medication refills: We will need the name of the medication, the dosage, the ordering provider, whether you get a 30 or 90 day refill, and the pharmacy name and address  Medications will be ordered by the provider only  Nurses cannot call in prescriptions  Please allow 7 days for medication refills  Physician requested updates: If your provider requested that you call with an update after starting medication, please be ready to provide us the medication and dosage, what time you take your medication, the time you attempt to fall asleep, time you fall asleep, when you wake up, and what time you get out of bed  Sleep Study Results: We will contact you with sleep study results and/or next steps after the physician has reviewed your testing             JOSEFA [address]

## 2023-03-21 NOTE — PATIENT INSTRUCTIONS
As you appear to be having some central sleep apnea events while using CPAP, I would recommend getting a CPAP study done as soon as possible  We can make recommendations based off that study  As I know you live close to our [place] office, you could schedule with Dr Yovani Butterfield for follow-up after your CPAP study  If you have any questions or concerns prior to that please call our office    Nursing Support:  When: Monday through Friday 7A-5PM except holidays  Where: Our direct line is [phone]  If you are having a true emergency please call 911  In the event that the line is busy or it is after hours please leave a voice message and we will return your call  Please speak clearly, leaving your full name, birth date, best number to reach you and the reason for your call  Medication refills: We will need the name of the medication, the dosage, the ordering provider, whether you get a 30 or 90 day refill, and the pharmacy name and address  Medications will be ordered by the provider only  Nurses cannot call in prescriptions  Please allow 7 days for medication refills  Physician requested updates: If your provider requested that you call with an update after starting medication, please be ready to provide us the medication and dosage, what time you take your medication, the time you attempt to fall asleep, time you fall asleep, when you wake up, and what time you get out of bed  Sleep Study Results: We will contact you with sleep study results and/or next steps after the physician has reviewed your testing

## 2023-03-21 NOTE — ASSESSMENT & PLAN NOTE
Patient was diagnosed with moderate obstructive sleep apnea on a home sleep study performed on 4/7/2022  His DONALD was noted to be 17  He has been using auto CPAP since 7/28/2022  He notes himself not to be snoring but does not feel he is has any other benefits such as decreased daytime sleepiness  His AHI was noted to be 5 1 today  I ordered a mask fitting as he feels his mask is leaking at times  I also ordered a CPAP study to further evaluate his sleep apnea in the sleep lab  We can make any adjustments based on the CPAP study  In the meantime, he will continue to use his CPAP nightly

## 2023-04-11 PROBLEM — U07.1 COVID-19: Status: RESOLVED | Noted: 2020-12-16 | Resolved: 2023-04-11

## 2023-04-11 PROBLEM — Z12.11 COLON CANCER SCREENING: Status: RESOLVED | Noted: 2019-03-07 | Resolved: 2023-04-11

## 2023-05-12 ENCOUNTER — OFFICE VISIT (OUTPATIENT)
Dept: CARDIOLOGY CLINIC | Facility: HOSPITAL | Age: 68
End: 2023-05-12

## 2023-05-12 VITALS
WEIGHT: 264 LBS | SYSTOLIC BLOOD PRESSURE: 138 MMHG | BODY MASS INDEX: 39.1 KG/M2 | DIASTOLIC BLOOD PRESSURE: 86 MMHG | HEIGHT: 69 IN | HEART RATE: 84 BPM

## 2023-05-12 DIAGNOSIS — G47.33 OSA (OBSTRUCTIVE SLEEP APNEA): ICD-10-CM

## 2023-05-12 DIAGNOSIS — I25.119 ATHEROSCLEROSIS OF CORONARY ARTERY WITH ANGINA PECTORIS, UNSPECIFIED VESSEL OR LESION TYPE, UNSPECIFIED WHETHER NATIVE OR TRANSPLANTED HEART (HCC): ICD-10-CM

## 2023-05-12 DIAGNOSIS — I10 BENIGN ESSENTIAL HYPERTENSION: ICD-10-CM

## 2023-05-12 DIAGNOSIS — I65.29 OCCLUSION OF CAROTID ARTERY, UNSPECIFIED LATERALITY: ICD-10-CM

## 2023-05-12 DIAGNOSIS — E78.5 DYSLIPIDEMIA: ICD-10-CM

## 2023-05-12 DIAGNOSIS — E66.01 MORBID OBESITY WITH BMI OF 40.0-44.9, ADULT (HCC): ICD-10-CM

## 2023-05-12 DIAGNOSIS — I25.10 ATHEROSCLEROSIS OF NATIVE CORONARY ARTERY OF NATIVE HEART WITHOUT ANGINA PECTORIS: Primary | ICD-10-CM

## 2023-05-12 NOTE — PROGRESS NOTES
Cardiology Follow Up    Kirk Sandhoff [facility]  1955  [id number]  [address] [facility] [address]  [place] [address] [place] [address]  [phone]    1  Atherosclerosis of native coronary artery of native heart without angina pectoris        2  Benign essential hypertension        3  Occlusion of carotid artery, unspecified laterality        4  LIZ (obstructive sleep apnea)        5  Atherosclerosis of coronary artery with angina pectoris, unspecified vessel or lesion type, unspecified whether native or transplanted heart ([facility] [address] )        6  Dyslipidemia            Discussion/Summary:  1  Coronary artery disease with PCI of LAD in 2010, moderate residual circ and RCA disease  2  Hypertension  3  Hyperlipidemia  4  Probable obstructive sleep apnea  5  Morbid obesity  6  Peripheral arterial disease  7  Dyspnea on exertion    Recommendations: Overall he is doing well coronary disease is stable no complaints of angina recent stress test showed no ischemia  Echocardiogram shows preserved LV systolic function  Blood pressures been controlled lipids have been doing well on his current dose of statin  Unfortunately he is not very compliant with his CPAP we tried to talk about strategies to use this more often  Peripheral arterial disease is stable carotids have mild plaque we will image at 2-year intervals  Follow-up 8 to 9 months    Interval History:  Very pleasant 79-year-old gentleman with a history of coronary artery disease as described above, hypertension, hyperlipidemia, obesity presents for new patient consultation on behalf of [address] [place] [address] for management of coronary artery disease  Over the past year he has had some shortness of breath with physical activity and walking up an incline    He gets some occasional chest pain it which is located over left anterior chest wall he has been told this is musculoskeletal in the past  He has not had any cardiac workup recently  He denies any lower extremity edema, PND, orthopnea  Overall he continues to do well he does a lot of work around his property  Denies any exertional symptoms  There is been no chest pain, shortness of breath, palpitations, lightheadedness, dizziness, or syncope  There is been no lower extremity edema, PND, orthopnea  He is taking all medications as prescribed  Medical Problems             Problem List     Anxiety    Atherosclerotic heart disease of native coronary artery without angina pectoris    Coronary atherosclerosis    Benign essential hypertension    Hyperlipidemia    Joint inflammation    Vitamin D deficiency    Acquired scoliosis    Carotid artery occlusion    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 [address] )    Colon cancer screening    Overview Signed 3/7/2019 11:54 AM by Anali Stafford     Added automatically from request for surgery [id number]         Right wrist pain    BMI 38 0-38 9,adult    Impingement syndrome of right shoulder    COVID-19    Prediabetes    Daytime hypersomnolence    Morbid obesity with BMI of 40 0-44 9, adult [facility])              Past Medical History:   Diagnosis Date   • Cardiac disease    • Hypertension    • Myocardial infarction [facility])      Social History     Socioeconomic History   • Marital status: /Civil Union     Spouse name: Not on file   • Number of children: Not on file   • Years of education: Not on file   • Highest education level: Not on file   Occupational History   • Not on file Tobacco Use   • Smoking status: Never   • Smokeless tobacco: Never   Vaping Use   • Vaping Use: Never used   Substance and Sexual Activity   • Alcohol use: No   • Drug use: No   • Sexual activity: Not on file   Other Topics Concern   • Not on file   Social History Narrative    Dental care, regularly    Good dental hygiene    No caffeine use    Sun protection sunscreen     Social Determinants of Health     Financial Resource Strain: Not on file   Food Insecurity: Not on file   Transportation Needs: Not on file   Physical Activity: Not on file   Stress: Not on file   Social Connections: Not on file   Intimate Partner Violence: Not on file   Housing Stability: Not on file      Family History   Problem Relation Age of Onset   • Breast cancer Mother    • Coronary artery disease Mother    • Diabetes Mother    • Hyperlipidemia Mother    • Atrial fibrillation Father      Past Surgical History:   Procedure Laterality Date   • CORONARY STENT PLACEMENT     • HERNIA REPAIR     • OTHER SURGICAL HISTORY  09/18/2010    Arterial catheterization   • RI COLONOSCOPY FLX DX W/COLLJ SPEC WHEN PFRMD N/A 3/27/2019    Procedure: COLONOSCOPY;  Surgeon: Scotty Grover MD;  Location: [facility];  Service: Gastroenterology   • TONSILLECTOMY AND ADENOIDECTOMY     • TOTAL HIP ARTHROPLASTY         Current Outpatient Medications:   •  aspirin 81 MG tablet, Take 81 mg by mouth daily  , Disp: , Rfl:   •  Cholecalciferol (Vitamin D3) 1 25 MG (94399 UT) CAPS, Take 1 capsule (50,000 Units total) by mouth once a week, Disp: 12 capsule, Rfl: 0  •  escitalopram (Lexapro) 10 mg tablet, Take 1 tablet (10 mg total) by mouth daily, Disp: 30 tablet, Rfl: 5  •  ezetimibe (ZETIA) 10 mg tablet, Take 1 tablet (10 mg total) by mouth daily, Disp: 90 tablet, Rfl: 3  •  Garlic Oil 4761 MG CAPS, Take 2 capsules by mouth daily, Disp: , Rfl:   •  nitroglycerin (Nitrostat) 0 4 mg SL tablet, Place 1 tablet (0 4 mg total) under the tongue every 5 (five) minutes as needed for chest pain, Disp: 25 tablet, Rfl: 3  •  rosuvastatin (CRESTOR) 20 MG tablet, Take 1 tablet (20 mg total) by mouth daily, Disp: 90 tablet, Rfl: 0  Allergies   Allergen Reactions   • Erythromycin Base Diarrhea   • Other Other (See Comments)     HIGH DOSE ASPIRINS    Nose bleeds   • Oxycodone-Acetaminophen Other (See Comments)     hallucinations   • Percocet [Oxycodone-Acetaminophen] Other (See Comments)     hallucinations   • Penicillins Hives and Rash       Labs:     Chemistry        Component Value Date/Time     (L) 04/02/2015 1435    K 4 4 04/05/2023 0907    K 3 8 04/02/2015 1435     04/05/2023 0907    CL 98 04/02/2015 1435    CO2 26 04/05/2023 0907    CO2 26 1 04/02/2015 1435    BUN 15 04/05/2023 0907    BUN 15 04/02/2015 1435    CREATININE 1 15 04/05/2023 0907    CREATININE 1 06 04/02/2015 1435        Component Value Date/Time    CALCIUM 8 8 04/05/2023 0907    CALCIUM 8 1 (L) 04/02/2015 1435    ALKPHOS 41 (L) 04/05/2023 0907    ALKPHOS 50 04/02/2015 1435    AST 35 04/05/2023 0907    AST 37 04/02/2015 1435    ALT 29 04/05/2023 0907    ALT 45 04/02/2015 1435    BILITOT 0 5 04/02/2015 1435            Lab Results   Component Value Date    CHOL 173 03/26/2015    CHOL 199 08/11/2014     Lab Results   Component Value Date    HDL 46 04/05/2023    HDL 39 (L) 12/02/2022    HDL 41 05/21/2022     Lab Results   Component Value Date    LDLCALC 68 04/05/2023    LDLCALC 51 12/02/2022    LDLCALC 61 05/21/2022     Lab Results   Component Value Date    TRIG 118 04/05/2023    TRIG 134 12/02/2022    TRIG 91 05/21/2022     No results found for: CHOLHDL    Imaging: No results found  ECG:  Normal sinus rhythm      Review of Systems   Constitutional: Negative  HENT: Negative  Eyes: Negative  Cardiovascular: Negative  Negative for chest pain, dyspnea on exertion, leg swelling, near-syncope, orthopnea, palpitations, paroxysmal nocturnal dyspnea and syncope  Respiratory: Negative  Endocrine: Negative  "  Hematologic/Lymphatic: Negative  Skin: Negative  Musculoskeletal: Negative  Gastrointestinal: Negative  Genitourinary: Negative  Neurological: Negative  Psychiatric/Behavioral: Negative  All other systems reviewed and are negative  Vitals:    05/12/23 1530   BP: 138/86   Pulse: 84     Vitals:    05/12/23 1530   Weight: 120 kg (264 lb)     Height: 5' 9\" (175 3 cm)   Body mass index is 38 99 kg/m²  Physical Exam:  Vital signs reviewed  General:  Alert and cooperative, appears stated age, no acute distress  HEENT:  PERRLA, EOMI, no scleral icterus, no conjunctival pallor  Neck:  No lymphadenopathy, no thyromegaly, no carotid bruits, no elevated JVP  Heart:  Regular rate and rhythm, normal S1/S2, no S3/S4, no murmur, rubs or gallops  PMI nondisplaced  Lungs:  Clear to auscultation bilaterally, no wheezes rales or rhonchi  Abdomen:  Soft, non-tender, positive bowel sounds, no rebound or guarding,   no organomegaly   Extremities:  Normal range of motion    No clubbing, cyanosis or edema   Vascular:  2+ pedal pulses  Skin:  No rashes or lesions on exposed skin  Neurologic:  Cranial nerves II-XII grossly intact without focal deficits  Psych:  Normal mood and affect        "

## 2023-07-02 DIAGNOSIS — E78.2 MIXED HYPERLIPIDEMIA: ICD-10-CM

## 2023-07-02 RX ORDER — ROSUVASTATIN CALCIUM 20 MG/1
TABLET, COATED ORAL
Qty: 90 TABLET | Refills: 0 | Status: SHIPPED | OUTPATIENT
Start: 2023-07-02

## 2023-08-15 DIAGNOSIS — I25.10 ATHEROSCLEROSIS OF NATIVE CORONARY ARTERY WITHOUT ANGINA PECTORIS, UNSPECIFIED WHETHER NATIVE OR TRANSPLANTED HEART: ICD-10-CM

## 2023-08-15 RX ORDER — NITROGLYCERIN 0.4 MG/1
0.4 TABLET SUBLINGUAL
Qty: 25 TABLET | Refills: 3 | Status: SHIPPED | OUTPATIENT
Start: 2023-08-15

## 2023-09-28 ENCOUNTER — OFFICE VISIT (OUTPATIENT)
Dept: SLEEP CENTER | Facility: CLINIC | Age: 68
End: 2023-09-28
Payer: MEDICARE

## 2023-09-28 VITALS
DIASTOLIC BLOOD PRESSURE: 80 MMHG | HEART RATE: 80 BPM | OXYGEN SATURATION: 97 % | TEMPERATURE: 97.8 F | SYSTOLIC BLOOD PRESSURE: 144 MMHG | BODY MASS INDEX: 39.69 KG/M2 | HEIGHT: 69 IN | WEIGHT: 268 LBS

## 2023-09-28 DIAGNOSIS — E78.2 MIXED HYPERLIPIDEMIA: ICD-10-CM

## 2023-09-28 DIAGNOSIS — G47.33 OSA (OBSTRUCTIVE SLEEP APNEA): Primary | ICD-10-CM

## 2023-09-28 DIAGNOSIS — E66.9 OBESITY (BMI 30-39.9): ICD-10-CM

## 2023-09-28 DIAGNOSIS — G47.09 OTHER INSOMNIA: ICD-10-CM

## 2023-09-28 DIAGNOSIS — I10 BENIGN ESSENTIAL HYPERTENSION: ICD-10-CM

## 2023-09-28 DIAGNOSIS — F41.9 ANXIETY: ICD-10-CM

## 2023-09-28 DIAGNOSIS — I25.119 ATHEROSCLEROSIS OF CORONARY ARTERY WITH ANGINA PECTORIS, UNSPECIFIED VESSEL OR LESION TYPE, UNSPECIFIED WHETHER NATIVE OR TRANSPLANTED HEART (HCC): ICD-10-CM

## 2023-09-28 PROCEDURE — 99214 OFFICE O/P EST MOD 30 MIN: CPT | Performed by: INTERNAL MEDICINE

## 2023-09-28 RX ORDER — ROSUVASTATIN CALCIUM 20 MG/1
TABLET, COATED ORAL
Qty: 90 TABLET | Refills: 0 | Status: SHIPPED | OUTPATIENT
Start: 2023-09-28

## 2023-09-28 NOTE — PATIENT INSTRUCTIONS

## 2023-09-28 NOTE — PROGRESS NOTES
Follow-Up Note - Sleep Center   Mary Beth Guardado  70 y.o. male  :1955  ZOZ:[id number]  DOS:2023    CC: I saw this patient for follow-up in clinic today for Sleep disordered breathing, Coexisting Sleep and Medical Problems. He was previously under care of Dr. Jett Perez and his PA. I reviewed clinic records. Interval changes: He is using a [address] sleep study on 2022 with an DONALD of 17/h with minimum oxygen saturation of 84% and 5.6% of the study was spent with saturations below 90%. He was then set up with an auto CPAP on 2022. PFSH, Problem List, Medications & Allergies were reviewed in EMR. He  has a past medical history of Cardiac disease, Hypertension, and Myocardial infarction ([address]). He has a current medication list which includes the following prescription(s): aspirin, vitamin d3, escitalopram, ezetimibe, garlic oil, nitroglycerin, and rosuvastatin. PHYSIOLOGICAL DATA REVIEW : Using PAP > 4 hours/night 80%. Estimated DONALD 1.3/hour with pressure of 7.9cm Indira@Five9 percentile; patient has not been using non FDA approved devices to sanitize the machine. INTERPRETATION: Compliance is Very good; Pressure setting is:optimal; ;   SUBJECTIVE: With respect to use of PAP, Cristina Rai  is experiencing no adverse effects:. He derives benefit. Is satisfied with sleep and daytime function. Sleep Routine: Cristina Rai reports getting 6.5 hrs sleep; he has no difficulty initiating, but reports diffculty maintaining sleep . He attributes to racing thoughts and "some nights cannot shut the brain off ". He arises spontaneously and is not always refreshed. Cristina Rai denies excessive daytime sleepiness,. He rated [himself] at Total score: 6 /24 on the [place] Sleepiness Scale. Other issues: He has work-related stress. Habits:[ reports that he has never smoked.  He has never used smokeless tobacco.], [ reports no history of alcohol use.], [ reports no history of drug use.], Caffeine use:excessive; Exercise routine: regular. ROS: Significant for weight has been stable. Some nasal stuffiness due to environmental allergies. He is reporting no respiratory or cardiac symptoms. He has a prescription for Lexapro that he is not taking. Mike Estrada EXAM: /80 (BP Location: Left arm, Cuff Size: Large)   Pulse 80   Temp 97.8 °F (36.6 °C) (Temporal)   Ht 5' 9" (1.753 m)   Wt 122 kg (268 lb)   SpO2 97%   BMI 39.58 kg/m²     Wt Readings from Last 3 Encounters:   09/28/23 122 kg (268 lb)   05/12/23 120 kg (264 lb)   04/11/23 122 kg (269 lb)      Patient is well groomed; well appearing. CNS: Alert, orientated, speech clear & coherent  Psych: cooperative and in no distress. Mental state: Appears normal.  H&N: EOMI; NC/AT: No facial pressure marks, no rashes. Skin/Extrem: col & hydration normal; no edema  Resp: Respiratory effort is normal  Physical findings otherwise essentially unchanged from previous. IMPRESSION: Problem List Items & Comorbidities Addressed this Visit    1. LIZ (obstructive sleep apnea)  PAP DME Resupply/Reorder      2. Benign essential hypertension        3. Other insomnia        4. Anxiety        5. Atherosclerosis of coronary artery with angina pectoris, unspecified vessel or lesion type, unspecified whether native or transplanted heart (HCC)        6. Obesity (BMI 30-39. 9)            PLAN:  1. I reviewed results of prior studies and physiologic data with the patient. 2. I discussed treatment options with risks and benefits. 3. Treatment with  PAP is medically necessary and Ghulam Victoria is agreable to continue use. 4. Care of equipment, methods to improve comfort using PAP and importance of compliance with therapy were discussed. 5. Pressure setting: continue 5-15 cmH2O. He was scheduled for a lab titration study, but since he is not doing well, it is not warranted. 6. Rx provided to replace supplies and Care coordinated with DME provider.    7. Multi component Cognitive behavioral therapy for Insomnia undertaken - Sleep Restriction, Stimulus control, Relaxation techniques and Sleep hygiene were discussed. 8. Also advised him to reinitiate Lexapro. 9. Discussed strategies for weight reduction. 10. Follow-up is advised in 1 year or sooner if needed to monitor progress, compliance and to adjust therapy. Thank you for allowing me to participate in the care of this patient. Sincerely,     Authenticated electronically on 35/74/65   Board Certified Specialist     Portions of the record may have been created with voice recognition software. Occasional wrong word or "sound a like" substitutions may have occurred due to the inherent limitations of voice recognition software. There may also be notations and random deletions of words or characters from malfunctioning software. Read the chart carefully and recognize, using context, where substitutions/deletions have occurred.

## 2023-09-29 ENCOUNTER — TELEPHONE (OUTPATIENT)
Dept: SLEEP CENTER | Facility: CLINIC | Age: 68
End: 2023-09-29

## 2023-09-29 LAB
DME PARACHUTE DELIVERY DATE REQUESTED: NORMAL
DME PARACHUTE ITEM DESCRIPTION: NORMAL
DME PARACHUTE ORDER STATUS: NORMAL
DME PARACHUTE SUPPLIER NAME: NORMAL
DME PARACHUTE SUPPLIER PHONE: NORMAL

## 2023-09-30 DIAGNOSIS — E55.9 VITAMIN D DEFICIENCY: ICD-10-CM

## 2023-09-30 RX ORDER — CHOLECALCIFEROL (VITAMIN D3) 1250 MCG
1 CAPSULE ORAL WEEKLY
Qty: 12 CAPSULE | Refills: 0 | Status: SHIPPED | OUTPATIENT
Start: 2023-09-30

## 2023-10-03 LAB

## 2023-11-09 ENCOUNTER — APPOINTMENT (OUTPATIENT)
Dept: LAB | Facility: MEDICAL CENTER | Age: 68
End: 2023-11-09
Payer: MEDICARE

## 2023-11-09 DIAGNOSIS — R73.03 PREDIABETES: ICD-10-CM

## 2023-11-09 DIAGNOSIS — E78.5 DYSLIPIDEMIA: ICD-10-CM

## 2023-11-09 DIAGNOSIS — I25.119 ATHEROSCLEROSIS OF CORONARY ARTERY WITH ANGINA PECTORIS, UNSPECIFIED VESSEL OR LESION TYPE, UNSPECIFIED WHETHER NATIVE OR TRANSPLANTED HEART (HCC): ICD-10-CM

## 2023-11-09 DIAGNOSIS — I10 BENIGN ESSENTIAL HYPERTENSION: ICD-10-CM

## 2023-11-09 DIAGNOSIS — I25.10 ATHEROSCLEROSIS OF NATIVE CORONARY ARTERY OF NATIVE HEART WITHOUT ANGINA PECTORIS: ICD-10-CM

## 2023-11-09 DIAGNOSIS — G47.33 OSA (OBSTRUCTIVE SLEEP APNEA): ICD-10-CM

## 2023-11-09 DIAGNOSIS — E55.9 VITAMIN D DEFICIENCY: ICD-10-CM

## 2023-11-09 LAB
25(OH)D3 SERPL-MCNC: 55.1 NG/ML (ref 30–100)
ALBUMIN SERPL BCP-MCNC: 4.2 G/DL (ref 3.5–5)
ALP SERPL-CCNC: 39 U/L (ref 34–104)
ALT SERPL W P-5'-P-CCNC: 20 U/L (ref 7–52)
ANION GAP SERPL CALCULATED.3IONS-SCNC: 7 MMOL/L
AST SERPL W P-5'-P-CCNC: 29 U/L (ref 13–39)
BASOPHILS # BLD AUTO: 0.02 THOUSANDS/ÂΜL (ref 0–0.1)
BASOPHILS NFR BLD AUTO: 0 % (ref 0–1)
BILIRUB SERPL-MCNC: 0.95 MG/DL (ref 0.2–1)
BUN SERPL-MCNC: 19 MG/DL (ref 5–25)
CALCIUM SERPL-MCNC: 9.3 MG/DL (ref 8.4–10.2)
CHLORIDE SERPL-SCNC: 102 MMOL/L (ref 96–108)
CHOLEST SERPL-MCNC: 130 MG/DL
CO2 SERPL-SCNC: 28 MMOL/L (ref 21–32)
CREAT SERPL-MCNC: 1.2 MG/DL (ref 0.6–1.3)
EOSINOPHIL # BLD AUTO: 0.41 THOUSAND/ÂΜL (ref 0–0.61)
EOSINOPHIL NFR BLD AUTO: 6 % (ref 0–6)
ERYTHROCYTE [DISTWIDTH] IN BLOOD BY AUTOMATED COUNT: 13.2 % (ref 11.6–15.1)
EST. AVERAGE GLUCOSE BLD GHB EST-MCNC: 148 MG/DL
GFR SERPL CREATININE-BSD FRML MDRD: 61 ML/MIN/1.73SQ M
GLUCOSE P FAST SERPL-MCNC: 119 MG/DL (ref 65–99)
HBA1C MFR BLD: 6.8 %
HCT VFR BLD AUTO: 50.9 % (ref 36.5–49.3)
HDLC SERPL-MCNC: 46 MG/DL
HGB BLD-MCNC: 16.9 G/DL (ref 12–17)
IMM GRANULOCYTES # BLD AUTO: 0.01 THOUSAND/UL (ref 0–0.2)
IMM GRANULOCYTES NFR BLD AUTO: 0 % (ref 0–2)
LDLC SERPL CALC-MCNC: 58 MG/DL (ref 0–100)
LYMPHOCYTES # BLD AUTO: 2.25 THOUSANDS/ÂΜL (ref 0.6–4.47)
LYMPHOCYTES NFR BLD AUTO: 34 % (ref 14–44)
MCH RBC QN AUTO: 30.3 PG (ref 26.8–34.3)
MCHC RBC AUTO-ENTMCNC: 33.2 G/DL (ref 31.4–37.4)
MCV RBC AUTO: 91 FL (ref 82–98)
MONOCYTES # BLD AUTO: 0.69 THOUSAND/ÂΜL (ref 0.17–1.22)
MONOCYTES NFR BLD AUTO: 10 % (ref 4–12)
NEUTROPHILS # BLD AUTO: 3.31 THOUSANDS/ÂΜL (ref 1.85–7.62)
NEUTS SEG NFR BLD AUTO: 50 % (ref 43–75)
NONHDLC SERPL-MCNC: 84 MG/DL
NRBC BLD AUTO-RTO: 0 /100 WBCS
PLATELET # BLD AUTO: 223 THOUSANDS/UL (ref 149–390)
PMV BLD AUTO: 12.3 FL (ref 8.9–12.7)
POTASSIUM SERPL-SCNC: 4.5 MMOL/L (ref 3.5–5.3)
PROT SERPL-MCNC: 6.9 G/DL (ref 6.4–8.4)
RBC # BLD AUTO: 5.57 MILLION/UL (ref 3.88–5.62)
SODIUM SERPL-SCNC: 137 MMOL/L (ref 135–147)
TRIGL SERPL-MCNC: 130 MG/DL
TSH SERPL DL<=0.05 MIU/L-ACNC: 2.85 UIU/ML (ref 0.45–4.5)
WBC # BLD AUTO: 6.69 THOUSAND/UL (ref 4.31–10.16)

## 2023-11-09 PROCEDURE — 80053 COMPREHEN METABOLIC PANEL: CPT

## 2023-11-09 PROCEDURE — 82306 VITAMIN D 25 HYDROXY: CPT

## 2023-11-09 PROCEDURE — 85025 COMPLETE CBC W/AUTO DIFF WBC: CPT

## 2023-11-09 PROCEDURE — 80061 LIPID PANEL: CPT

## 2023-11-09 PROCEDURE — 84443 ASSAY THYROID STIM HORMONE: CPT

## 2023-11-09 PROCEDURE — 83036 HEMOGLOBIN GLYCOSYLATED A1C: CPT

## 2023-11-09 PROCEDURE — 36415 COLL VENOUS BLD VENIPUNCTURE: CPT

## 2023-11-14 ENCOUNTER — OFFICE VISIT (OUTPATIENT)
Dept: INTERNAL MEDICINE CLINIC | Facility: CLINIC | Age: 68
End: 2023-11-14
Payer: MEDICARE

## 2023-11-14 VITALS
BODY MASS INDEX: 39.21 KG/M2 | SYSTOLIC BLOOD PRESSURE: 160 MMHG | DIASTOLIC BLOOD PRESSURE: 90 MMHG | HEIGHT: 69 IN | OXYGEN SATURATION: 96 % | WEIGHT: 264.7 LBS | TEMPERATURE: 97.7 F | HEART RATE: 81 BPM

## 2023-11-14 DIAGNOSIS — Z00.00 MEDICARE ANNUAL WELLNESS VISIT, SUBSEQUENT: Primary | ICD-10-CM

## 2023-11-14 DIAGNOSIS — E11.9 NEW ONSET TYPE 2 DIABETES MELLITUS (HCC): ICD-10-CM

## 2023-11-14 DIAGNOSIS — Z95.5 PRESENCE OF DRUG COATED STENT IN LAD CORONARY ARTERY: ICD-10-CM

## 2023-11-14 DIAGNOSIS — G47.33 OSA (OBSTRUCTIVE SLEEP APNEA): ICD-10-CM

## 2023-11-14 DIAGNOSIS — E78.00 PURE HYPERCHOLESTEROLEMIA: ICD-10-CM

## 2023-11-14 DIAGNOSIS — I10 BENIGN ESSENTIAL HYPERTENSION: ICD-10-CM

## 2023-11-14 DIAGNOSIS — I25.10 ATHEROSCLEROSIS OF NATIVE CORONARY ARTERY OF NATIVE HEART WITHOUT ANGINA PECTORIS: ICD-10-CM

## 2023-11-14 DIAGNOSIS — Z23 ENCOUNTER FOR IMMUNIZATION: ICD-10-CM

## 2023-11-14 DIAGNOSIS — M25.50 ARTHRALGIA, UNSPECIFIED JOINT: ICD-10-CM

## 2023-11-14 PROBLEM — Z96.643 HISTORY OF TOTAL REPLACEMENT OF BOTH HIP JOINTS: Status: ACTIVE | Noted: 2019-10-16

## 2023-11-14 LAB
CREAT UR-MCNC: 94.4 MG/DL
MICROALBUMIN UR-MCNC: <7 MG/L
MICROALBUMIN/CREAT 24H UR: <7 MG/G CREATININE (ref 0–30)

## 2023-11-14 PROCEDURE — G0439 PPPS, SUBSEQ VISIT: HCPCS | Performed by: NURSE PRACTITIONER

## 2023-11-14 PROCEDURE — 82570 ASSAY OF URINE CREATININE: CPT | Performed by: NURSE PRACTITIONER

## 2023-11-14 PROCEDURE — 82043 UR ALBUMIN QUANTITATIVE: CPT | Performed by: NURSE PRACTITIONER

## 2023-11-14 PROCEDURE — G0008 ADMIN INFLUENZA VIRUS VAC: HCPCS | Performed by: NURSE PRACTITIONER

## 2023-11-14 PROCEDURE — 99214 OFFICE O/P EST MOD 30 MIN: CPT | Performed by: NURSE PRACTITIONER

## 2023-11-14 PROCEDURE — 90662 IIV NO PRSV INCREASED AG IM: CPT | Performed by: NURSE PRACTITIONER

## 2023-11-14 NOTE — PROGRESS NOTES
Assessment and Plan: A1C is at 6.8. Foot exam is normal does see Podiatry. Will obtain urine. Will give flu vaccine. Will order lyme titer. Continue to see Cardiology. Will repeat A1C in 3 months. Will follow up in 6 months. Problem List Items Addressed This Visit       Atherosclerotic heart disease of native coronary artery without angina pectoris    Benign essential hypertension    Pure hypercholesterolemia    Presence of drug coated stent in LAD coronary artery    LIZ (obstructive sleep apnea)    New onset type 2 diabetes mellitus ([address])    Relevant Orders    HEMOGLOBIN A1C W/ EAG ESTIMATION    Albumin / creatinine urine ratio    Medicare annual wellness visit, subsequent - Primary     Other Visit Diagnoses       Arthralgia, unspecified joint        Relevant Orders    Lyme Total AB W Reflex to IGM/IGG    Encounter for immunization        Relevant Orders    influenza vaccine, high-dose, PF 0.7 mL (FLUZONE HIGH-DOSE) (Completed)            Depression Screening and Follow-up Plan: Patient was screened for depression during today's encounter. They screened negative with a PHQ-2 score of 0. Preventive health issues were discussed with patient, and age appropriate screening tests were ordered as noted in patient's After Visit Summary. Personalized health advice and appropriate referrals for health education or preventive services given if needed, as noted in patient's After Visit Summary. History of Present Illness:     Patient presents for a Medicare Wellness Visit    Miriam Mclain is for a follow up and medicare wellness. He is doing well not having any issues. He would like a flu vaccine. He did see his labs and is upset about his A1C. He wants to try diet first before medication. He denies any chest pain, SOB, or palpitations. He denies any depression or anxiety. He continues to see Cardiology on a routine basis.  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 He does have joint pain as well. He offers no other issues. Patient Care Team:  Swapna Nails as PCP - General (Family Medicine)  MD Todd Giles MD Kathaleen Ou, Evelia Linares MD as Endoscopist     Review of Systems:     Review of Systems   Musculoskeletal:  Positive for arthralgias and myalgias. All other systems reviewed and are negative. Below is the patient's most recent value for Albumin, ALT, AST, BUN, Calcium, Chloride, Cholesterol, CO2, Creatinine, GFR, Glucose, HDL, Hematocrit, Hemoglobin, Hemoglobin A1C, LDL, Magnesium, Phosphorus, Platelets, Potassium, PSA, Sodium, Triglycerides, and WBC. Lab Results   Component Value Date    ALT 20 11/09/2023    AST 29 11/09/2023    BUN 19 11/09/2023    CALCIUM 9.3 11/09/2023     11/09/2023    CHOL 173 03/26/2015    CO2 28 11/09/2023    CREATININE 1.20 11/09/2023    HDL 46 11/09/2023    HCT 50.9 (H) 11/09/2023    HGB 16.9 11/09/2023    HGBA1C 6.8 (H) 11/09/2023    MG 2.3 03/19/2018     11/09/2023    K 4.5 11/09/2023    PSA 0.8 05/21/2022     (L) 04/02/2015    TRIG 130 11/09/2023    WBC 6.69 11/09/2023     Note: for a comprehensive list of the patient's lab results, access the Results Review activity.      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    Localized primary osteoarthritis of wrist    Lumbosacral spondylosis without myelopathy    Old intraocular magnetic foreign body    Osteoarthritis of hip    Overweight    Pure hypercholesterolemia    Presence of drug coated stent in LAD coronary artery    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[address])    Right wrist pain    BMI 38.0-38.9,adult    Impingement syndrome of right shoulder    Prediabetes    Daytime hypersomnolence    Class 2 obesity    LIZ (obstructive sleep apnea)    Nausea    Pain of left heel    Morbid obesity with BMI of 40.0-44.9, adult ([address])    New onset type 2 diabetes mellitus ([address])    History of total replacement of both hip joints    Medicare annual wellness visit, subsequent      Past Medical and Surgical History:     Past Medical History:   Diagnosis Date    Cardiac disease     Hypertension     Myocardial infarction [facility])      Past Surgical History:   Procedure Laterality Date    CORONARY STENT PLACEMENT      HERNIA REPAIR      OTHER SURGICAL HISTORY  09/18/2010    Arterial catheterization    [facility] COLONOSCOPY FLX DX W/COLLJ SPEC WHEN PFRMD N/A 3/27/2019    Procedure: COLONOSCOPY;  Surgeon: Reine Goldmann, MD;  Location: [facility];  Service: Gastroenterology    TONSILLECTOMY AND ADENOIDECTOMY      TOTAL HIP ARTHROPLASTY        Family History:     Family History   Problem Relation Age of Onset    Breast cancer Mother     Coronary artery disease Mother     Diabetes Mother     Hyperlipidemia Mother     Atrial fibrillation Father       Social History:     Social History     Socioeconomic History    Marital status: /Civil Union     Spouse name: None    Number of children: None    Years of education: None    Highest education level: None   Occupational History    None   Tobacco Use    Smoking status: Never    Smokeless tobacco: Never   Vaping Use    Vaping Use: Never used   Substance and Sexual Activity    Alcohol use: No    Drug use: No    Sexual activity: None   Other Topics Concern    None   Social History Narrative    Dental care, regularly    Good dental hygiene    No caffeine use    Sun protection sunscreen     Social Determinants of Health     Financial Resource Strain: Low Risk  (11/14/2023)    Overall Financial Resource Strain (CARDIA)     Difficulty of Paying Living Expenses: Not hard at all   Food Insecurity: Not on file   Transportation Needs: No Transportation Needs (11/14/2023)    PRAPARE - Transportation     Lack of Transportation (Medical): No     Lack of Transportation (Non-Medical): No   Physical Activity: Not on file   Stress: Not on file   Social Connections: Not on file   Intimate Partner Violence: Not on file   Housing Stability: Not on file      Medications and Allergies:     Current Outpatient Medications   Medication Sig Dispense Refill    aspirin 81 MG tablet Take 81 mg by mouth daily. Cholecalciferol (Vitamin D3) 1.25 MG (63792 UT) CAPS Take 1 capsule by mouth once a week 12 capsule 0    escitalopram (Lexapro) 10 mg tablet Take 1 tablet (10 mg total) by mouth daily 30 tablet 5    ezetimibe (ZETIA) 10 mg tablet Take 1 tablet (10 mg total) by mouth daily 90 tablet 3    Garlic Oil 2369 MG CAPS Take 2 capsules by mouth daily      nitroglycerin (Nitrostat) 0.4 mg SL tablet Place 1 tablet (0.4 mg total) under the tongue every 5 (five) minutes as needed for chest pain 25 tablet 3    rosuvastatin (CRESTOR) 20 MG tablet Take 1 tablet by mouth once daily 90 tablet 0     No current facility-administered medications for this visit.      Allergies   Allergen Reactions    Erythromycin Base Diarrhea    Other Other (See Comments)     HIGH DOSE ASPIRINS    Nose bleeds    Oxycodone-Acetaminophen Other (See Comments)     hallucinations    Percocet [Oxycodone-Acetaminophen] Other (See Comments)     hallucinations    Penicillins Hives and Rash      Immunizations:     Immunization History   Administered Date(s) Administered    COVID-19 PFIZER VACCINE 0.3 ML IM 09/03/2021, 09/26/2021    Influenza, high dose seasonal 0.7 mL 11/14/2019, 11/06/2020, 12/08/2022, 11/14/2023    Pneumococcal Conjugate 13-Valent 11/14/2019    Pneumococcal Polysaccharide PPV23 02/23/2021    Tdap 05/06/2017      Health Maintenance:         Topic Date Due    Colorectal Cancer Screening  03/27/2029    Hepatitis C Screening Completed         Topic Date Due    COVID-19 Vaccine (3 - Pfizer series) 11/21/2021      Medicare Screening Tests and Risk Assessments:     Pollo Lopez is here for his Subsequent Wellness visit. Health Risk Assessment:   Patient rates overall health as good. Patient feels that their physical health rating is same. Patient is very satisfied with their life. Eyesight was rated as slightly worse. Hearing was rated as same. Patient feels that their emotional and mental health rating is same. Patients states they are never, rarely angry. Patient states they are never, rarely unusually tired/fatigued. Pain experienced in the last 7 days has been some. Patient's pain rating has been 3/10. Patient states that he has experienced no weight loss or gain in last 6 months. Depression Screening:   PHQ-2 Score: 0      Fall Risk Screening: In the past year, patient has experienced: no history of falling in past year      Home Safety:  Patient does not have trouble with stairs inside or outside of their home. Patient has working smoke alarms and has no working carbon monoxide detector. Home safety hazards include: none. Nutrition:   Current diet is Regular and No Added Salt. Medications:   Patient is currently taking over-the-counter supplements. OTC medications include: see medication list. Patient is able to manage medications. Activities of Daily Living (ADLs)/Instrumental Activities of Daily Living (IADLs):   Walk and transfer into and out of bed and chair?: Yes  Dress and groom yourself?: Yes    Bathe or shower yourself?: Yes    Feed yourself? Yes  Do your laundry/housekeeping?: Yes  Manage your money, pay your bills and track your expenses?: Yes  Make your own meals?: Yes    Do your own shopping?: Yes    Previous Hospitalizations:   Any hospitalizations or ED visits within the last 12 months?: No      Advance Care Planning:   Living will: Yes    Durable POA for healthcare: Yes    Advanced directive:  Yes Advanced directive counseling given: No    Five wishes given: No    Patient declined ACP directive: No    End of Life Decisions reviewed with patient: No    Provider agrees with end of life decisions: Yes      Cognitive Screening:   Provider or family/friend/caregiver concerned regarding cognition?: No    PREVENTIVE SCREENINGS      Cardiovascular Screening:    General: History Lipid Disorder, Risks and Benefits Discussed and Screening Current      Diabetes Screening:     General: Screening Not Indicated, History Diabetes, Risks and Benefits Discussed and Screening Current      Colorectal Cancer Screening:     General: Screening Current      Prostate Cancer Screening:    General: Risks and Benefits Discussed and Screening Current      Osteoporosis Screening:    General: Screening Not Indicated      Abdominal Aortic Aneurysm (AAA) Screening:    Risk factors include: age between 70-77 yo        Lung Cancer Screening:     General: Screening Not Indicated      Hepatitis C Screening:    General: Screening Current    Screening, Brief Intervention, and Referral to Treatment (SBIRT)    Screening  Typical number of drinks in a day: 0  Typical number of drinks in a week: 0  Interpretation: Low risk drinking behavior. AUDIT-C Screenin) How often did you have a drink containing alcohol in the past year? monthly or less  2) How many drinks did you have on a typical day when you were drinking in the past year? 1 to 2  3) How often did you have 6 or more drinks on one occasion in the past year? never    AUDIT-C Score: 1  Interpretation: Score 0-3 (male): Negative screen for alcohol misuse    Single Item Drug Screening:  How often have you used an illegal drug (including marijuana) or a prescription medication for non-medical reasons in the past year? never    Single Item Drug Screen Score: 0  Interpretation: Negative screen for possible drug use disorder    No results found. Patient's shoes and socks removed.     Right Foot/Ankle   Right Foot Inspection  Skin Exam: skin normal and skin intact. No dry skin, no warmth, no callus, no erythema, no maceration, no abnormal color, no pre-ulcer, no ulcer and no callus. Toe Exam: ROM and strength within normal limits. Sensory   Vibration: intact  Proprioception: intact  Monofilament testing: intact    Vascular  Capillary refills: < 3 seconds  The right DP pulse is 2+. The right PT pulse is 2+. Left Foot/Ankle  Left Foot Inspection  Skin Exam: skin normal and skin intact. No dry skin, no warmth, no erythema, no maceration, normal color, no pre-ulcer, no ulcer and no callus. Toe Exam: ROM and strength within normal limits. Sensory   Vibration: intact  Proprioception: intact  Monofilament testing: intact    Vascular  Capillary refills: < 3 seconds  The left DP pulse is 2+. The left PT pulse is 2+. Assign Risk Category  No deformity present  No loss of protective sensation  No weak pulses  Risk: 0      Physical Exam:     /90   Pulse 81   Temp 97.7 °F (36.5 °C) (Oral)   Ht 5' 9" (1.753 m)   Wt 120 kg (264 lb 11.2 oz)   SpO2 96%   BMI 39.09 kg/m²     Physical Exam  Vitals reviewed. Constitutional:       Appearance: Normal appearance. He is obese. HENT:      Head: Normocephalic and atraumatic. Right Ear: Tympanic membrane, ear canal and external ear normal.      Left Ear: Tympanic membrane, ear canal and external ear normal.      Nose: Nose normal.      Mouth/Throat:      Mouth: Mucous membranes are moist.      Pharynx: Oropharynx is clear. Eyes:      Extraocular Movements: Extraocular movements intact. Conjunctiva/sclera: Conjunctivae normal.      Pupils: Pupils are equal, round, and reactive to light. Cardiovascular:      Rate and Rhythm: Normal rate and regular rhythm. Pulses: Normal pulses. no weak pulses          Dorsalis pedis pulses are 2+ on the right side and 2+ on the left side.         Posterior tibial pulses are 2+ on the right side and 2+ on the left side. Heart sounds: Normal heart sounds. Pulmonary:      Effort: Pulmonary effort is normal.      Breath sounds: Normal breath sounds. Abdominal:      General: Abdomen is flat. Bowel sounds are normal.      Palpations: Abdomen is soft. Musculoskeletal:         General: Normal range of motion. Cervical back: Normal range of motion and neck supple. Feet:      Right foot:      Skin integrity: No ulcer, skin breakdown, erythema, warmth, callus or dry skin. Left foot:      Skin integrity: No ulcer, skin breakdown, erythema, warmth, callus or dry skin. Skin:     General: Skin is warm and dry. Capillary Refill: Capillary refill takes less than 2 seconds. Neurological:      General: No focal deficit present. Mental Status: He is alert and oriented to person, place, and time. Mental status is at baseline. Psychiatric:         Mood and Affect: Mood normal.         Behavior: Behavior normal.         Thought Content:  Thought content normal.         Judgment: Judgment normal.          YUNI Ge

## 2023-11-14 NOTE — PATIENT INSTRUCTIONS
Medicare Preventive Visit Patient Instructions  Thank you for completing your Welcome to Medicare Visit or Medicare Annual Wellness Visit today. Your next wellness visit will be due in one year (11/14/2024). The screening/preventive services that you may require over the next 5-10 years are detailed below. Some tests may not apply to you based off risk factors and/or age. Screening tests ordered at today's visit but not completed yet may show as past due. Also, please note that scanned in results may not display below. Preventive Screenings:  Service Recommendations Previous Testing/Comments   Colorectal Cancer Screening  Colonoscopy    Fecal Occult Blood Test (FOBT)/Fecal Immunochemical Test (FIT)  Fecal DNA/Cologuard Test  Flexible Sigmoidoscopy Age: 43-73 years old   Colonoscopy: every 10 years (May be performed more frequently if at higher risk)  OR  FOBT/FIT: every 1 year  OR  Cologuard: every 3 years  OR  Sigmoidoscopy: every 5 years  Screening may be recommended earlier than age 39 if at higher risk for colorectal cancer. Also, an individualized decision between you and your healthcare provider will decide whether screening between the ages of 77-80 would be appropriate.  Colonoscopy: 03/27/2019  FOBT/FIT: Not on file  Cologuard: Not on file  Sigmoidoscopy: Not on file    Screening Current     Prostate Cancer Screening Individualized decision between patient and health care provider in men between ages of 53-66   Medicare will cover every 12 months beginning on the day after your 50th birthday PSA: 0.8 ng/mL           Hepatitis C Screening Once for adults born between 1945 and 1965  More frequently in patients at high risk for Hepatitis C Hep C Antibody: 03/05/2019    Screening Current   Diabetes Screening 1-2 times per year if you're at risk for diabetes or have pre-diabetes Fasting glucose: 119 mg/dL (11/9/2023)  A1C: 6.8 % (11/9/2023)  Screening Not Indicated  History Diabetes   Cholesterol Screening Once every 5 years if you don't have a lipid disorder. May order more often based on risk factors. Lipid panel: 11/09/2023  Screening Not Indicated  History Lipid Disorder      Other Preventive Screenings Covered by Medicare:  Abdominal Aortic Aneurysm (AAA) Screening: covered once if your at risk. You're considered to be at risk if you have a family history of AAA or a male between the age of 70-76 who smoking at least 100 cigarettes in your lifetime. Lung Cancer Screening: covers low dose CT scan once per year if you meet all of the following conditions: (1) Age 48-67; (2) No signs or symptoms of lung cancer; (3) Current smoker or have quit smoking within the last 15 years; (4) You have a tobacco smoking history of at least 20 pack years (packs per day x number of years you smoked); (5) You get a written order from a healthcare provider. Glaucoma Screening: covered annually if you're considered high risk: (1) You have diabetes OR (2) Family history of glaucoma OR (3)  aged 48 and older OR (3)  American aged 72 and older  Osteoporosis Screening: covered every 2 years if you meet one of the following conditions: (1) Have a vertebral abnormality; (2) On glucocorticoid therapy for more than 3 months; (3) Have primary hyperparathyroidism; (4) On osteoporosis medications and need to assess response to drug therapy. HIV Screening: covered annually if you're between the age of 14-79. Also covered annually if you are younger than 13 and older than 72 with risk factors for HIV infection. For pregnant patients, it is covered up to 3 times per pregnancy.     Immunizations:  Immunization Recommendations   Influenza Vaccine Annual influenza vaccination during flu season is recommended for all persons aged >= 6 months who do not have contraindications   Pneumococcal Vaccine   * Pneumococcal conjugate vaccine = PCV13 (Prevnar 13), PCV15 (Vaxneuvance), PCV20 (Prevnar 20)  * Pneumococcal polysaccharide vaccine = PPSV23 (Pneumovax) Adults 73-20 yo with certain risk factors or if 69+ yo  If never received any pneumonia vaccine: recommend Prevnar 20 (PCV20)  Give PCV20 if previously received 1 dose of PCV13 or PPSV23   Hepatitis B Vaccine 3 dose series if at intermediate or high risk (ex: diabetes, end stage renal disease, liver disease)   Respiratory syncytial virus (RSV) Vaccine - COVERED BY MEDICARE PART D  * RSVPreF3 (Arexvy) CDC recommends that adults 61years of age and older may receive a single dose of RSV vaccine using shared clinical decision-making (SCDM)   Tetanus (Td) Vaccine - COST NOT COVERED BY MEDICARE PART B Following completion of primary series, a booster dose should be given every 10 years to maintain immunity against tetanus. Td may also be given as tetanus wound prophylaxis. Tdap Vaccine - COST NOT COVERED BY MEDICARE PART B Recommended at least once for all adults. For pregnant patients, recommended with each pregnancy. Shingles Vaccine (Shingrix) - COST NOT COVERED BY MEDICARE PART B  2 shot series recommended in those 19 years and older who have or will have weakened immune systems or those 50 years and older     Health Maintenance Due:      Topic Date Due   • Colorectal Cancer Screening  03/27/2029   • Hepatitis C Screening  Completed     Immunizations Due:      Topic Date Due   • COVID-19 Vaccine (3 - Pfizer series) 11/21/2021   • Influenza Vaccine (1) 09/01/2023     Advance Directives   What are advance directives? Advance directives are legal documents that state your wishes and plans for medical care. These plans are made ahead of time in case you lose your ability to make decisions for yourself. Advance directives can apply to any medical decision, such as the treatments you want, and if you want to donate organs. What are the types of advance directives? There are many types of advance directives, and each state has rules about how to use them.  You may choose a combination of any of the following:  Living will: This is a written record of the treatment you want. You can also choose which treatments you do not want, which to limit, and which to stop at a certain time. This includes surgery, medicine, IV fluid, and tube feedings. Durable power of  for healthcare [facility]): This is a written record that states who you want to make healthcare choices for you when you are unable to make them for yourself. This person, called a proxy, is usually a family member or a friend. You may choose more than 1 proxy. Do not resuscitate (DNR) order:  A DNR order is used in case your heart stops beating or you stop breathing. It is a request not to have certain forms of treatment, such as CPR. A DNR order may be included in other types of advance directives. Medical directive: This covers the care that you want if you are in a coma, near death, or unable to make decisions for yourself. You can list the treatments you want for each condition. Treatment may include pain medicine, surgery, blood transfusions, dialysis, IV or tube feedings, and a ventilator (breathing machine). Values history: This document has questions about your views, beliefs, and how you feel and think about life. This information can help others choose the care that you would choose. Why are advance directives important? An advance directive helps you control your care. Although spoken wishes may be used, it is better to have your wishes written down. Spoken wishes can be misunderstood, or not followed. Treatments may be given even if you do not want them. An advance directive may make it easier for your family to make difficult choices about your care. Weight Management   Why it is important to manage your weight:  Being overweight increases your risk of health conditions such as heart disease, high blood pressure, type 2 diabetes, and certain types of cancer.  It can also increase your risk for osteoarthritis, sleep apnea, and other respiratory problems. Aim for a slow, steady weight loss. Even a small amount of weight loss can lower your risk of health problems. How to lose weight safely:  A safe and healthy way to lose weight is to eat fewer calories and get regular exercise. You can lose up about 1 pound a week by decreasing the number of calories you eat by 500 calories each day. Healthy meal plan for weight management:  A healthy meal plan includes a variety of foods, contains fewer calories, and helps you stay healthy. A healthy meal plan includes the following:  Eat whole-grain foods more often. A healthy meal plan should contain fiber. Fiber is the part of grains, fruits, and vegetables that is not broken down by your body. Whole-grain foods are healthy and provide extra fiber in your diet. Some examples of whole-grain foods are whole-wheat breads and pastas, oatmeal, brown rice, and bulgur. Eat a variety of vegetables every day. Include dark, leafy greens such as spinach, kale, kvng greens, and mustard greens. Eat yellow and orange vegetables such as carrots, sweet potatoes, and winter squash. Eat a variety of fruits every day. Choose fresh or canned fruit (canned in its own juice or light syrup) instead of juice. Fruit juice has very little or no fiber. Eat low-fat dairy foods. Drink fat-free (skim) milk or 1% milk. Eat fat-free yogurt and low-fat cottage cheese. Try low-fat cheeses such as mozzarella and other reduced-fat cheeses. Choose meat and other protein foods that are low in fat. Choose beans or other legumes such as split peas or lentils. Choose fish, skinless poultry (chicken or turkey), or lean cuts of red meat (beef or pork). Before you cook meat or poultry, cut off any visible fat. Use less fat and oil. Try baking foods instead of frying them. Add less fat, such as margarine, sour cream, regular salad dressing and mayonnaise to foods. Eat fewer high-fat foods.  Some examples of high-fat foods include french fries, doughnuts, ice cream, and cakes. Eat fewer sweets. Limit foods and drinks that are high in sugar. This includes candy, cookies, regular soda, and sweetened drinks. Exercise:  Exercise at least 30 minutes per day on most days of the week. Some examples of exercise include walking, biking, dancing, and swimming. You can also fit in more physical activity by taking the stairs instead of the elevator or parking farther away from stores. Ask your healthcare provider about the best exercise plan for you. © Copyright [employer] 2018 Information is for End User's use only and may not be sold, redistributed or otherwise used for commercial purposes.  All illustrations and images included in CareNotes® are the copyrighted property of [employer] or [address] [place] [address]

## 2023-11-24 ENCOUNTER — OFFICE VISIT (OUTPATIENT)
Dept: URGENT CARE | Facility: MEDICAL CENTER | Age: 68
End: 2023-11-24
Payer: MEDICARE

## 2023-11-24 VITALS
HEART RATE: 89 BPM | RESPIRATION RATE: 20 BRPM | TEMPERATURE: 97.6 F | DIASTOLIC BLOOD PRESSURE: 82 MMHG | OXYGEN SATURATION: 98 % | WEIGHT: 260 LBS | BODY MASS INDEX: 38.4 KG/M2 | SYSTOLIC BLOOD PRESSURE: 122 MMHG

## 2023-11-24 DIAGNOSIS — R09.81 SINUS CONGESTION: Primary | ICD-10-CM

## 2023-11-24 LAB
SARS-COV-2 AG UPPER RESP QL IA: NEGATIVE
VALID CONTROL: NORMAL

## 2023-11-24 PROCEDURE — G0463 HOSPITAL OUTPT CLINIC VISIT: HCPCS

## 2023-11-24 PROCEDURE — 99212 OFFICE O/P EST SF 10 MIN: CPT

## 2023-11-24 PROCEDURE — 87811 SARS-COV-2 COVID19 W/OPTIC: CPT

## 2023-11-24 RX ORDER — FLUTICASONE PROPIONATE 50 MCG
1 SPRAY, SUSPENSION (ML) NASAL DAILY
Qty: 11.1 ML | Refills: 0 | Status: SHIPPED | OUTPATIENT
Start: 2023-11-24

## 2023-11-24 RX ORDER — METHYLPREDNISOLONE 4 MG/1
TABLET ORAL
Qty: 1 EACH | Refills: 0 | Status: SHIPPED | OUTPATIENT
Start: 2023-11-24

## 2023-11-24 NOTE — PATIENT INSTRUCTIONS
You may take over the counter Tylenol (Acetaminophen) and/or Motrin (Ibuprofen) as needed, as directed on packaging. Please follow up with your primary provider in the next several days. Should you have any worsening of symptoms, or lack of improvement please be re-evaluated. If needed for significant concerns, consider 911 or ER evaluation. Be sure to get plenty of rest, and drinking fluids to remain hydrated. Over the counter decongestants can be used, only as directed on the box. However if you have any history of cardiac disease or high blood pressure these should be avoided, as we caution the use of these since they can make place strain on your heart and cause increase in blood pressure. It is recommended in lieu of decongestants to use cool mist vaporizer, saline nasal spray to relieve nasal congestion. It is also important to remain hydrated and drink plenty of fluids (avoiding caffeine and alcohol). If you were just recently exposed to someone who was ill with COVID, it may be too early to detect the virus. You should monitor your symptoms, and if you continue to have symptoms or they worsen you can obtain a Home COVID test at your local pharmacy to test again in a few days. Until you are feeling well, you should be sure to use proper hygiene to etiquette to prevent the spread of any illness you may be experiencing regardless of your test results. COVID and Flu are both viral illnesses with similar presentation and both require only symptomatic treatments for most patients. Should you have any questions about your treatment please follow up with your family doctor's office.

## 2023-11-24 NOTE — PROGRESS NOTES
[facility]        NAME: Tatiana Maurer is a 70 y.o. male  : 1955    MRN: [id number]  DATE: 2023  TIME: 8:40 AM    Assessment and Plan   Sinus congestion [R09.81]  1. Sinus congestion  Poct Covid 19 Rapid Antigen Test    fluticasone (FLONASE) 50 mcg/act nasal spray    methylPREDNISolone 4 MG tablet therapy pack            Patient Instructions       Follow up with PCP in 3-5 days. Proceed to  ER if symptoms worsen. Chief Complaint     Chief Complaint   Patient presents with   • Ear Fullness     B/L ear fullness. • Nasal Congestion   • Facial Pain     Head pressure   • Sinusitis     Sx started 2 days ago    • Dizziness         History of Present Illness       Patient here with x3 days of symptoms. 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 He did have his flu shot last week. Since the time of his flu shot he feels like his sinus have been full. He has also had some dizziness, blocked head feeling and hearing is muffled. No fevers or sore throat. At home he has not been taking anything for his symptoms. He does have LIZ and uses a CPAP- last 2 nights he has had trouble with using it due to the sinus congestion. Review of Systems   Review of Systems   Constitutional:  Negative for appetite change, chills, fatigue and fever. HENT:  Positive for congestion, postnasal drip and rhinorrhea. Negative for ear pain, sinus pressure, sinus pain, sore throat and trouble swallowing. Respiratory:  Negative for cough and shortness of breath. Cardiovascular:  Negative for chest pain and palpitations. Gastrointestinal:  Positive for nausea. Negative for abdominal pain, constipation, diarrhea and vomiting. Musculoskeletal:  Negative for arthralgias and back pain. Skin:  Negative for color change and rash. Neurological:  Positive for dizziness and headaches. Negative for light-headedness. All other systems reviewed and are negative.         Current Medications Current Outpatient Medications:   •  aspirin 81 MG tablet, Take 81 mg by mouth daily. , Disp: , Rfl:   •  Cholecalciferol (Vitamin D3) 1.25 MG (37479 UT) CAPS, Take 1 capsule by mouth once a week, Disp: 12 capsule, Rfl: 0  •  ezetimibe (ZETIA) 10 mg tablet, Take 1 tablet (10 mg total) by mouth daily, Disp: 90 tablet, Rfl: 3  •  fluticasone (FLONASE) 50 mcg/act nasal spray, 1 spray into each nostril daily, Disp: 11.1 mL, Rfl: 0  •  Garlic Oil 3461 MG CAPS, Take 2 capsules by mouth daily, Disp: , Rfl:   •  methylPREDNISolone 4 MG tablet therapy pack, Use as directed on package, Disp: 1 each, Rfl: 0  •  nitroglycerin (Nitrostat) 0.4 mg SL tablet, Place 1 tablet (0.4 mg total) under the tongue every 5 (five) minutes as needed for chest pain, Disp: 25 tablet, Rfl: 3  •  rosuvastatin (CRESTOR) 20 MG tablet, Take 1 tablet by mouth once daily, Disp: 90 tablet, Rfl: 0  •  escitalopram (Lexapro) 10 mg tablet, Take 1 tablet (10 mg total) by mouth daily (Patient not taking: Reported on 11/24/2023), Disp: 30 tablet, Rfl: 5    Current Allergies     Allergies as of 11/24/2023 - Reviewed 11/24/2023   Allergen Reaction Noted   • Erythromycin base Diarrhea 03/05/2019   • Other Other (See Comments)    • Oxycodone-acetaminophen Other (See Comments)    • Percocet [oxycodone-acetaminophen] Other (See Comments) 01/27/2016   • Penicillins Hives and Rash 01/05/2012            The following portions of the patient's history were reviewed and updated as appropriate: allergies, current medications, past family history, past medical history, past social history, past surgical history and problem list.     Past Medical History:   Diagnosis Date   • Cardiac disease    • Hypertension    • Myocardial infarction [facility])    • Pre-diabetes        Past Surgical History:   Procedure Laterality Date   • CORONARY STENT PLACEMENT     • HERNIA REPAIR     • OTHER SURGICAL HISTORY  09/18/2010    Arterial catheterization   • MN COLONOSCOPY FLX DX W/COLLJ SPEC WHEN PFRMD N/A 3/27/2019    Procedure: COLONOSCOPY;  Surgeon: Abi Cullen MD;  Location: [facility];  Service: Gastroenterology   • TONSILLECTOMY AND ADENOIDECTOMY     • TOTAL HIP ARTHROPLASTY         Family History   Problem Relation Age of Onset   • Breast cancer Mother    • Coronary artery disease Mother    • Diabetes Mother    • Hyperlipidemia Mother    • Atrial fibrillation Father          Medications have been verified. Objective   /82   Pulse 89   Temp 97.6 °F (36.4 °C)   Resp 20   Wt 118 kg (260 lb)   SpO2 98%   BMI 38.40 kg/m²        Physical Exam     Physical Exam  Vitals and nursing note reviewed. Constitutional:       General: He is not in acute distress. Appearance: Normal appearance. He is normal weight. He is not ill-appearing. HENT:      Head: Normocephalic and atraumatic. Right Ear: Ear canal and external ear normal. A middle ear effusion is present. Tympanic membrane is erythematous and bulging. Left Ear: Ear canal and external ear normal. A middle ear effusion is present. Tympanic membrane is erythematous and bulging. Nose: Congestion and rhinorrhea present. Rhinorrhea is clear. Right Turbinates: Swollen. Left Turbinates: Swollen. Right Sinus: No maxillary sinus tenderness or frontal sinus tenderness. Left Sinus: No maxillary sinus tenderness or frontal sinus tenderness. Mouth/Throat:      Lips: Pink. Mouth: Mucous membranes are moist.      Pharynx: Oropharynx is clear. Uvula midline. No pharyngeal swelling, oropharyngeal exudate, posterior oropharyngeal erythema or uvula swelling. Tonsils: No tonsillar exudate or tonsillar abscesses. 0 on the right. 0 on the left. Eyes:      Extraocular Movements: Extraocular movements intact. Conjunctiva/sclera: Conjunctivae normal.      Pupils: Pupils are equal, round, and reactive to light. Cardiovascular:      Rate and Rhythm: Normal rate and regular rhythm. Pulses: Normal pulses. Heart sounds: Normal heart sounds. Pulmonary:      Effort: Pulmonary effort is normal.      Breath sounds: Normal breath sounds. Abdominal:      General: Abdomen is flat. Bowel sounds are normal.      Palpations: Abdomen is soft. Musculoskeletal:         General: Normal range of motion. Cervical back: Full passive range of motion without pain, normal range of motion and neck supple. Lymphadenopathy:      Cervical: No cervical adenopathy. Skin:     General: Skin is warm and dry. Capillary Refill: Capillary refill takes less than 2 seconds. Neurological:      General: No focal deficit present. Mental Status: He is alert and oriented to person, place, and time.    Psychiatric:         Mood and Affect: Mood normal.         Behavior: Behavior normal.

## 2023-12-03 DIAGNOSIS — E78.2 MIXED HYPERLIPIDEMIA: ICD-10-CM

## 2023-12-03 RX ORDER — ROSUVASTATIN CALCIUM 20 MG/1
TABLET, COATED ORAL
Qty: 90 TABLET | Refills: 0 | Status: SHIPPED | OUTPATIENT
Start: 2023-12-03

## 2023-12-11 NOTE — PROGRESS NOTES
Cardiology Follow Up    Jorge allan [facility]  1955  [id number]  [address] [place] [address] [facility] [place] [address]  [phone]    1. Atherosclerosis of native coronary artery of native heart without angina pectoris        2. Presence of drug coated stent in LAD coronary artery        3. Benign essential hypertension        4. Dyslipidemia        5. LIZ (obstructive sleep apnea)        6. Carotid stenosis, right  VAS carotid complete study          Discussion/Summary:  Coronary artery disease: With prior PCI to the LAD in 2010. Residual RCA/circumflex disease noted at that time  February 2022 - nuclear stress test did not reveal any myocardial ischemia  He denies any anginal symptoms  Continue aspirin and statin therapy      Hypertension:  Elevated in the office today but has been very well-controlled at home. His home blood pressure cuff was previously checked and found to be accurate. No changes will be made at this time. Dyslipidemia:  Lipids are at goal on current statin regimen  Last A1C increased to 6.8, dietary/lifestyle modifications were discussed in detail with patient and wife      Obstructive sleep apnea:  Has been using a nasal mask for treatment. Encouraged complete compliance    Will be due for carotid dopplers at the beginning of the year to follow up on mild right ICA stenosis. He will RTO in 6-8 months with Dr. Ruby Alfred or sooner if necessary. He will call with any concerns. Interval History:   Aneta Martinez is a 70 y.o. male  with coronary artery disease with PCI to the LAD in 2010 - residual RCA and circumflex disease noted at that time, hypertension, dyslipidemia, obesity, moderate obstructive sleep apnea who presents to the office today for routine follow up. Since his last office visit he has overall been well.   He does not participate in any formal exercise but remains active. He moves hay valery regularly without any chest pain/pressure/discomfort or shortness of breath. He denies lower extremity edema, orthopnea, PND. He denies lightness, dizziness, or syncope. He denies palpitations. He does have difficulty staying asleep at night. He is prescribed a nasal pillow for his obstructive sleep apnea. If he wakes up in the middle of the night he does not always replace the mask. 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)    Right wrist pain    BMI 38.0-38.9,adult    Impingement syndrome of right shoulder    Prediabetes    Daytime hypersomnolence    Class 2 obesity    LIZ (obstructive sleep apnea)    Nausea    Pain of left heel    Morbid obesity with BMI of 40.0-44.9, adult ([address])    New onset type 2 diabetes mellitus ([address])      Lab Results   Component Value Date    HGBA1C 6.8 (H) 11/09/2023         History of total replacement of both hip joints    Medicare annual wellness visit, subsequent        Past Medical History:   Diagnosis Date    Cardiac disease     Hypertension     Myocardial infarction ([address])     Pre-diabetes      Social History     Socioeconomic History    Marital status: /Civil Union     Spouse name: Not on file Number of children: Not on file    Years of education: Not on file    Highest education level: Not on file   Occupational History    Not on file   Tobacco Use    Smoking status: Never    Smokeless tobacco: Never   Vaping Use    Vaping Use: Never used   Substance and Sexual Activity    Alcohol use: No    Drug use: No    Sexual activity: Not on file   Other Topics Concern    Not on file   Social History Narrative    Dental care, regularly    Good dental hygiene    No caffeine use    Sun protection sunscreen     Social Determinants of Health     Financial Resource Strain: Low Risk  (11/14/2023)    Overall Financial Resource Strain (CARDIA)     Difficulty of Paying Living Expenses: Not hard at all   Food Insecurity: Not on file   Transportation Needs: No Transportation Needs (11/14/2023)    PRAPARE - Transportation     Lack of Transportation (Medical): No     Lack of Transportation (Non-Medical): No   Physical Activity: Not on file   Stress: Not on file   Social Connections: Not on file   Intimate Partner Violence: Not on file   Housing Stability: Not on file      Family History   Problem Relation Age of Onset    Breast cancer Mother     Coronary artery disease Mother     Diabetes Mother     Hyperlipidemia Mother     Atrial fibrillation Father      Past Surgical History:   Procedure Laterality Date    CORONARY STENT PLACEMENT      HERNIA REPAIR      OTHER SURGICAL HISTORY  09/18/2010    Arterial catheterization    WY COLONOSCOPY FLX DX W/COLLJ SPEC WHEN PFRMD N/A 3/27/2019    Procedure: COLONOSCOPY;  Surgeon: Ritu Panchal MD;  Location: [facility];  Service: Gastroenterology    TONSILLECTOMY AND ADENOIDECTOMY      TOTAL HIP ARTHROPLASTY         Current Outpatient Medications:     aspirin 81 MG tablet, Take 81 mg by mouth daily. , Disp: , Rfl:     Cholecalciferol (Vitamin D3) 1.25 MG (08009 UT) CAPS, Take 1 capsule by mouth once a week, Disp: 12 capsule, Rfl: 0    escitalopram (Lexapro) 10 mg tablet, Take 1 tablet (10 mg total) by mouth daily (Patient taking differently: Take 10 mg by mouth daily As  needed), Disp: 30 tablet, Rfl: 5    ezetimibe (ZETIA) 10 mg tablet, Take 1 tablet (10 mg total) by mouth daily, Disp: 90 tablet, Rfl: 3    fluticasone (FLONASE) 50 mcg/act nasal spray, 1 spray into each nostril daily, Disp: 11.1 mL, Rfl: 0    Garlic Oil 8993 MG CAPS, Take 2 capsules by mouth daily, Disp: , Rfl:     nitroglycerin (Nitrostat) 0.4 mg SL tablet, Place 1 tablet (0.4 mg total) under the tongue every 5 (five) minutes as needed for chest pain, Disp: 25 tablet, Rfl: 3    rosuvastatin (CRESTOR) 20 MG tablet, Take 1 tablet by mouth once daily, Disp: 90 tablet, Rfl: 0    methylPREDNISolone 4 MG tablet therapy pack, Use as directed on package, Disp: 1 each, Rfl: 0  Allergies   Allergen Reactions    Erythromycin Base Diarrhea    Other Other (See Comments)     HIGH DOSE ASPIRINS    Nose bleeds    Oxycodone-Acetaminophen Other (See Comments)     hallucinations    Percocet [Oxycodone-Acetaminophen] Other (See Comments)     hallucinations    Penicillins Hives and Rash       Labs:     Chemistry        Component Value Date/Time     (L) 04/02/2015 1435    K 4.5 11/09/2023 0727    K 3.8 04/02/2015 1435     11/09/2023 0727    CL 98 04/02/2015 1435    CO2 28 11/09/2023 0727    CO2 26.1 04/02/2015 1435    BUN 19 11/09/2023 0727    BUN 15 04/02/2015 1435    CREATININE 1.20 11/09/2023 0727    CREATININE 1.06 04/02/2015 1435        Component Value Date/Time    CALCIUM 9.3 11/09/2023 0727    CALCIUM 8.1 (L) 04/02/2015 1435    ALKPHOS 39 11/09/2023 0727    ALKPHOS 50 04/02/2015 1435    AST 29 11/09/2023 0727    AST 37 04/02/2015 1435    ALT 20 11/09/2023 0727    ALT 45 04/02/2015 1435    BILITOT 0.5 04/02/2015 1435            Lab Results   Component Value Date    CHOL 173 03/26/2015    CHOL 199 08/11/2014     Lab Results   Component Value Date    HDL 46 11/09/2023    HDL 46 04/05/2023    HDL 39 (L) 12/02/2022     Lab Results Component Value Date    LDLCALC 58 11/09/2023    LDLCALC 68 04/05/2023    LDLCALC 51 12/02/2022     Lab Results   Component Value Date    TRIG 130 11/09/2023    TRIG 118 04/05/2023    TRIG 134 12/02/2022     No results found for: "CHOLHDL"    Imaging: No results found. Review of Systems   Constitutional: Positive for malaise/fatigue. All other systems reviewed and are negative. Vitals:    12/12/23 1402   BP: 150/90   Pulse: 74   SpO2: 97%     Vitals:    12/12/23 1402   Weight: 118 kg (261 lb)     Height: 5' 9" (175.3 cm)   Body mass index is 38.54 kg/m². Physical Exam:  Physical Exam  Vitals reviewed. Constitutional:       General: He is not in acute distress. Appearance: He is well-developed. He is obese. He is not diaphoretic. HENT:      Head: Normocephalic and atraumatic. Eyes:      Pupils: Pupils are equal, round, and reactive to light. Neck:      Vascular: Carotid bruit (right) present. Cardiovascular:      Rate and Rhythm: Normal rate and regular rhythm. Pulses:           Radial pulses are 2+ on the right side and 2+ on the left side. Heart sounds: S1 normal and S2 normal. No murmur heard. Pulmonary:      Effort: Pulmonary effort is normal. No respiratory distress. Breath sounds: Normal breath sounds. No wheezing or rales. Abdominal:      General: There is no distension. Palpations: Abdomen is soft. Tenderness: There is no abdominal tenderness. Musculoskeletal:         General: Normal range of motion. Cervical back: Normal range of motion. Right lower leg: No edema. Left lower leg: No edema. Skin:     General: Skin is warm and dry. Findings: No erythema. Neurological:      General: No focal deficit present. Mental Status: He is alert and oriented to person, place, and time.    Psychiatric:         Mood and Affect: Mood normal.         Behavior: Behavior normal.

## 2023-12-12 ENCOUNTER — OFFICE VISIT (OUTPATIENT)
Dept: CARDIOLOGY CLINIC | Facility: HOSPITAL | Age: 68
End: 2023-12-12
Payer: MEDICARE

## 2023-12-12 VITALS
BODY MASS INDEX: 38.66 KG/M2 | HEART RATE: 74 BPM | OXYGEN SATURATION: 97 % | WEIGHT: 261 LBS | SYSTOLIC BLOOD PRESSURE: 150 MMHG | HEIGHT: 69 IN | DIASTOLIC BLOOD PRESSURE: 90 MMHG

## 2023-12-12 DIAGNOSIS — Z95.5 PRESENCE OF DRUG COATED STENT IN LAD CORONARY ARTERY: ICD-10-CM

## 2023-12-12 DIAGNOSIS — E78.5 DYSLIPIDEMIA: ICD-10-CM

## 2023-12-12 DIAGNOSIS — I25.10 ATHEROSCLEROSIS OF NATIVE CORONARY ARTERY OF NATIVE HEART WITHOUT ANGINA PECTORIS: Primary | ICD-10-CM

## 2023-12-12 DIAGNOSIS — I65.21 CAROTID STENOSIS, RIGHT: ICD-10-CM

## 2023-12-12 DIAGNOSIS — G47.33 OSA (OBSTRUCTIVE SLEEP APNEA): ICD-10-CM

## 2023-12-12 DIAGNOSIS — I10 BENIGN ESSENTIAL HYPERTENSION: ICD-10-CM

## 2023-12-12 PROBLEM — I65.23 BILATERAL CAROTID ARTERY STENOSIS: Status: RESOLVED | Noted: 2023-12-12 | Resolved: 2023-12-12

## 2023-12-12 PROBLEM — I65.23 BILATERAL CAROTID ARTERY STENOSIS: Status: ACTIVE | Noted: 2023-12-12

## 2023-12-12 PROCEDURE — 99214 OFFICE O/P EST MOD 30 MIN: CPT | Performed by: PHYSICIAN ASSISTANT

## 2024-01-06 ENCOUNTER — OFFICE VISIT (OUTPATIENT)
Dept: BARIATRICS | Facility: CLINIC | Age: 69
End: 2024-01-06
Payer: MEDICARE

## 2024-01-06 VITALS — DIASTOLIC BLOOD PRESSURE: 78 MMHG | SYSTOLIC BLOOD PRESSURE: 132 MMHG

## 2024-01-06 DIAGNOSIS — E66.9 CLASS 2 OBESITY: Primary | ICD-10-CM

## 2024-01-06 DIAGNOSIS — E11.9 TYPE 2 DIABETES MELLITUS WITHOUT COMPLICATION, WITHOUT LONG-TERM CURRENT USE OF INSULIN (HCC): ICD-10-CM

## 2024-01-06 PROCEDURE — 99214 OFFICE O/P EST MOD 30 MIN: CPT | Performed by: PHYSICIAN ASSISTANT

## 2024-01-06 RX ORDER — CLINDAMYCIN HYDROCHLORIDE 150 MG/1
600 CAPSULE ORAL AS NEEDED
COMMUNITY
Start: 2023-11-29

## 2024-01-06 NOTE — ASSESSMENT & PLAN NOTE
-Patient is pursuing Conservative Program  -Initial weight loss goal of 5-10% weight loss for improved health  -Screening labs: TSH, LP, A1c, and CMP reviewed from 11/9/23  -Down 8 lbs since consult last March. Attributes to minimizing refined carbs, but reports disappointed to see increase in A1c despite this. Reviewed dietary recs for DM. Reviewed fruit recs. His wife will be seeing RD this month and plans follow his wife. Discussed options for DM tx including another GLP-1 as he had constipation with Ozempic or metformin. He declined at this time and would like to c/w lifestyle modifications and start walking on his treadmill over the winter months.   -Reviewed significance of gradually creating new habits    Initial: 269  Current: 261  Change: -8  Goal: lose 30 lbs

## 2024-01-06 NOTE — PROGRESS NOTES
Assessment/Plan:    Class 2 obesity  -Patient is pursuing Conservative Program  -Initial weight loss goal of 5-10% weight loss for improved health  -Screening labs: TSH, LP, A1c, and CMP reviewed from 11/9/23  -Down 8 lbs since consult last March. Attributes to minimizing refined carbs, but reports disappointed to see increase in A1c despite this. Reviewed dietary recs for DM. Reviewed fruit recs. His wife will be seeing RD this month and plans follow his wife. Discussed options for DM tx including another GLP-1 as he had constipation with Ozempic or metformin. He declined at this time and would like to c/w lifestyle modifications and start walking on his treadmill over the winter months.   -Reviewed significance of gradually creating new habits    Initial: 269  Current: 261  Change: -8  Goal: lose 30 lbs     Follow up in approximately 3 months with Non-Surgical Physician/Advanced Practitioner.    Goals:  Food log (ie.) www.[employer].com,sparkpeople.com,loseit.com,[employer].com,etc. baritastic  No sugary beverages. At least 64oz of water daily.  Increase physical activity by 10 minutes daily. Gradually increase physical activity to a goal of 5 days per week for 30 minutes of MODERATE intensity PLUS 2 days per week of FULL BODY resistance training  5-10 servings of fruits and vegetables per day and 25-35 grams of dietary fiber per day, gradually increasing     Diagnoses and all orders for this visit:    Class 2 obesity    Type 2 diabetes mellitus without complication, without long-term current use of insulin (HCC)    Body mass index 38.0-38.9, adult    Subjective:   Chief Complaint   Patient presents with    Follow-up     MWM follow up      Patient ID: Javy Hale  is a 68 y.o. male with excess weight/obesity here to pursue weight management.  Patient is pursuing Conservative Program.     HPI  The patient presents for MWM follow up.    Attributes weight loss to minimizing refined carbs like white bread.  Notes that in the past lost weight after [facility] with increased exercise and fruit. Is concerned about fruit intake with DM diagnosis.    Struggles to stay asleep through the night. If something wakes him he has trouble with his thoughts. Discussed trying guided meditation.      The following portions of the patient's history were reviewed and updated as appropriate: allergies, current medications, past family history, past medical history, past social history, past surgical history, and problem list.    Review of Systems    Objective:    /78 (BP Location: Left arm, Patient Position: Sitting, Cuff Size: Large)      Physical Exam  Vitals and nursing note reviewed.     Constitutional   General appearance: Abnormal.  well developed and obese.    Pulmonary   Respiratory effort: No increased work of breathing or signs of respiratory distress.    Abdomen   Abdomen: Abnormal.  The abdomen was obese.   Musculoskeletal   Gait and station: Normal.    Psychiatric   Orientation to person, place and time: Normal.    Affect: appropriate    30 minute visit, >50% time spent counseling patient on diet behavior and exercise modification for weight loss.

## 2024-01-13 PROBLEM — Z00.00 MEDICARE ANNUAL WELLNESS VISIT, SUBSEQUENT: Status: RESOLVED | Noted: 2023-11-14 | Resolved: 2024-01-13

## 2024-01-23 ENCOUNTER — TELEPHONE (OUTPATIENT)
Dept: OTOLARYNGOLOGY | Facility: CLINIC | Age: 69
End: 2024-01-23

## 2024-01-23 NOTE — TELEPHONE ENCOUNTER
Spoke to wife about rescheduling Kulwinder to a in person visit. We did reschedule his appt.to see Carlotta not a nurse visit.

## 2024-02-12 ENCOUNTER — HOSPITAL ENCOUNTER (OUTPATIENT)
Dept: NON INVASIVE DIAGNOSTICS | Facility: HOSPITAL | Age: 69
Discharge: HOME | End: 2024-02-12
Payer: MEDICARE

## 2024-02-12 DIAGNOSIS — L30.9 DERMATITIS: Primary | ICD-10-CM

## 2024-02-12 DIAGNOSIS — I65.21 CAROTID STENOSIS, RIGHT: ICD-10-CM

## 2024-02-12 PROCEDURE — 93880 EXTRACRANIAL BILAT STUDY: CPT

## 2024-02-12 PROCEDURE — 93880 EXTRACRANIAL BILAT STUDY: CPT | Performed by: SURGERY

## 2024-02-12 RX ORDER — BETAMETHASONE DIPROPIONATE 0.5 MG/G
OINTMENT, AUGMENTED TOPICAL 2 TIMES DAILY
Qty: 50 G | Refills: 3 | Status: SHIPPED | OUTPATIENT
Start: 2024-02-12

## 2024-03-28 DIAGNOSIS — E78.2 MIXED HYPERLIPIDEMIA: ICD-10-CM

## 2024-03-28 RX ORDER — ROSUVASTATIN CALCIUM 20 MG/1
TABLET, COATED ORAL
Qty: 90 TABLET | Refills: 3 | Status: SHIPPED | OUTPATIENT
Start: 2024-03-28

## 2024-04-18 ENCOUNTER — APPOINTMENT (OUTPATIENT)
Dept: LAB | Facility: MEDICAL CENTER | Age: 69
End: 2024-04-18
Payer: MEDICARE

## 2024-04-18 DIAGNOSIS — M25.50 ARTHRALGIA, UNSPECIFIED JOINT: ICD-10-CM

## 2024-04-18 DIAGNOSIS — E11.9 NEW ONSET TYPE 2 DIABETES MELLITUS (HCC): ICD-10-CM

## 2024-04-18 PROCEDURE — 83036 HEMOGLOBIN GLYCOSYLATED A1C: CPT

## 2024-04-18 PROCEDURE — 36415 COLL VENOUS BLD VENIPUNCTURE: CPT

## 2024-04-19 LAB
EST. AVERAGE GLUCOSE BLD GHB EST-MCNC: 146 MG/DL
HBA1C MFR BLD: 6.7 %

## 2024-04-22 ENCOUNTER — TELEPHONE (OUTPATIENT)
Dept: ADMINISTRATIVE | Facility: OTHER | Age: 69
End: 2024-04-22

## 2024-04-22 ENCOUNTER — OFFICE VISIT (OUTPATIENT)
Dept: INTERNAL MEDICINE CLINIC | Facility: CLINIC | Age: 69
End: 2024-04-22
Payer: MEDICARE

## 2024-04-22 VITALS
HEIGHT: 69 IN | HEART RATE: 66 BPM | BODY MASS INDEX: 38.18 KG/M2 | SYSTOLIC BLOOD PRESSURE: 142 MMHG | TEMPERATURE: 97.8 F | OXYGEN SATURATION: 95 % | WEIGHT: 257.8 LBS | DIASTOLIC BLOOD PRESSURE: 86 MMHG

## 2024-04-22 DIAGNOSIS — I25.119 ATHEROSCLEROSIS OF CORONARY ARTERY WITH ANGINA PECTORIS, UNSPECIFIED VESSEL OR LESION TYPE, UNSPECIFIED WHETHER NATIVE OR TRANSPLANTED HEART (HCC): ICD-10-CM

## 2024-04-22 DIAGNOSIS — G47.33 OSA (OBSTRUCTIVE SLEEP APNEA): ICD-10-CM

## 2024-04-22 DIAGNOSIS — I25.10 ATHEROSCLEROSIS OF NATIVE CORONARY ARTERY OF NATIVE HEART WITHOUT ANGINA PECTORIS: ICD-10-CM

## 2024-04-22 DIAGNOSIS — E55.9 VITAMIN D DEFICIENCY: ICD-10-CM

## 2024-04-22 DIAGNOSIS — E78.2 MIXED HYPERLIPIDEMIA: ICD-10-CM

## 2024-04-22 DIAGNOSIS — E78.00 PURE HYPERCHOLESTEROLEMIA: ICD-10-CM

## 2024-04-22 DIAGNOSIS — E66.01 MORBID OBESITY WITH BMI OF 40.0-44.9, ADULT (HCC): ICD-10-CM

## 2024-04-22 DIAGNOSIS — E11.9 TYPE 2 DIABETES MELLITUS WITHOUT COMPLICATION, WITHOUT LONG-TERM CURRENT USE OF INSULIN (HCC): Primary | ICD-10-CM

## 2024-04-22 DIAGNOSIS — I10 BENIGN ESSENTIAL HYPERTENSION: ICD-10-CM

## 2024-04-22 PROBLEM — R11.0 NAUSEA: Status: RESOLVED | Noted: 2022-06-22 | Resolved: 2024-04-22

## 2024-04-22 PROBLEM — R73.03 PREDIABETES: Status: RESOLVED | Noted: 2021-09-20 | Resolved: 2024-04-22

## 2024-04-22 PROBLEM — E66.3 OVERWEIGHT: Status: RESOLVED | Noted: 2018-09-04 | Resolved: 2024-04-22

## 2024-04-22 PROCEDURE — 99214 OFFICE O/P EST MOD 30 MIN: CPT | Performed by: NURSE PRACTITIONER

## 2024-04-22 PROCEDURE — G2211 COMPLEX E/M VISIT ADD ON: HCPCS | Performed by: NURSE PRACTITIONER

## 2024-04-22 RX ORDER — ROSUVASTATIN CALCIUM 20 MG/1
20 TABLET, COATED ORAL DAILY
Qty: 90 TABLET | Refills: 3 | Status: SHIPPED | OUTPATIENT
Start: 2024-04-22

## 2024-04-22 RX ORDER — EZETIMIBE 10 MG/1
10 TABLET ORAL DAILY
Qty: 90 TABLET | Refills: 3 | Status: SHIPPED | OUTPATIENT
Start: 2024-04-22

## 2024-04-22 RX ORDER — CHOLECALCIFEROL (VITAMIN D3) 1250 MCG
50000 CAPSULE ORAL WEEKLY
Qty: 12 CAPSULE | Refills: 0 | Status: SHIPPED | OUTPATIENT
Start: 2024-04-22

## 2024-04-22 NOTE — TELEPHONE ENCOUNTER
----- Message from Chrissy Wiseman MA sent at 4/22/2024  7:55 AM EDT -----  04/22/24 7:55 AM    Hello, our patient Javy Hale has had Diabetic Eye Exam completed/performed. Please assist in updating the patient chart by making an External outreach to Dr [facility] facility located in [phone]. The date of service is within last year.    Thank you,  Chrissy Wiseman MA   [facility]

## 2024-04-22 NOTE — LETTER
Diabetic Eye Exam Form    Date Requested: 24  Patient: Javy Hale  Patient : 1955   Referring Provider: YUNI Wilkinson      DIABETIC Eye Exam Date _______________________________      Type of Exam MUST be documented for Diabetic Eye Exams. Please CHECK ONE.     Retinal Exam       Dilated Retinal Exam       OCT       Optomap-Iris Exam      Fundus Photography       Left Eye - Please check Retinopathy or No Retinopathy        Exam did show retinopathy    Exam did not show retinopathy       Right Eye - Please check Retinopathy or No Retinopathy       Exam did show retinopathy    Exam did not show retinopathy       Comments __________________________________________________________    Practice Providing Exam ______________________________________________    Exam Performed By (print name) _______________________________________      Provider Signature ___________________________________________________      These reports are needed for  compliance.  Please fax this completed form and a copy of the Diabetic Eye Exam report to our office located at [address] [place] [address] as soon as possible via Fax [phone] attention Dania: Phone [phone]  We thank you for your assistance in treating our mutual patient.

## 2024-04-22 NOTE — LETTER
Diabetic Eye Exam Form    Date Requested: 24  Patient: Javy Hale  Patient : 1955   Referring Provider: YUNI Wilkinson      DIABETIC Eye Exam Date _______________________________      Type of Exam MUST be documented for Diabetic Eye Exams. Please CHECK ONE.     Retinal Exam       Dilated Retinal Exam       OCT       Optomap-Iris Exam      Fundus Photography       Left Eye - Please check Retinopathy or No Retinopathy        Exam did show retinopathy    Exam did not show retinopathy       Right Eye - Please check Retinopathy or No Retinopathy       Exam did show retinopathy    Exam did not show retinopathy       Comments __________________________________________________________    Practice Providing Exam ______________________________________________    Exam Performed By (print name) _______________________________________      Provider Signature ___________________________________________________      These reports are needed for  compliance.  Please fax this completed form and a copy of the Diabetic Eye Exam report to our office located at [address] [place] [address] as soon as possible via Fax [phone] attention Dania: Phone [phone]  We thank you for your assistance in treating our mutual patient.

## 2024-04-22 NOTE — TELEPHONE ENCOUNTER
Upon review of the In Basket request and the patient's chart, initial outreach has been made via fax to facility. Please see Contacts section for details.     Thank you  Dania Demarco

## 2024-04-22 NOTE — PROGRESS NOTES
Name: Javy Hale      : 1955      MRN: [id number]  Encounter Provider: YUNI Wilkinson  Encounter Date: 2024   Encounter department: [facility]    Assessment & Plan A1C is  at 6.7. Continue to watch diet. Continues to see Cardiology. BP up on exam continues to monitor at home with low readings. Will follow back up in November for his medicare wellness. Will repeat A1C in 3 months.      1. Type 2 diabetes mellitus without complication, without long-term current use of insulin ([facility])    2. LIZ (obstructive sleep apnea)    3. Benign essential hypertension    4. Pure hypercholesterolemia    5. Mixed hyperlipidemia  -     ezetimibe (ZETIA) 10 mg tablet; Take 1 tablet (10 mg total) by mouth daily  -     rosuvastatin (CRESTOR) 20 MG tablet; Take 1 tablet (20 mg total) by mouth daily    6. Vitamin D deficiency  -     Cholecalciferol (Vitamin D3) 1.25 MG (08279 UT) capsule; Take 1 capsule (50,000 Units total) by mouth once a week    7. Morbid obesity with BMI of 40.0-44.9, adult ([facility])    8. Atherosclerosis of coronary artery with angina pectoris, unspecified vessel or lesion type, unspecified whether native or transplanted heart ([facility])    9. Atherosclerosis of native coronary artery of native heart without angina pectoris           Crystal Miramontes is for a follow up visit. 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 He continues to see Cardiology and is seeing [facility] due to increased pressure in the eye with some blurriness. He denies any chest pain, SOB, or palpitations. He denies any depression or anxiety. He is up to date on his screenings and labs. He offers no other issues.      Review of Systems   Musculoskeletal:  Positive for arthralgias and myalgias.   All other systems reviewed and are negative.      Current Outpatient Medications on File Prior to Visit   Medication Sig    aspirin 81 MG tablet Take 81 mg by mouth daily.    betamethasone,  "augmented, (DIPROLENE) 0.05 % ointment Apply topically 2 (two) times a day    clindamycin (CLEOCIN) 150 mg capsule Take 600 mg by mouth as needed 1 hour prior to dental appointment    escitalopram (Lexapro) 10 mg tablet Take 1 tablet (10 mg total) by mouth daily (Patient taking differently: Take 10 mg by mouth daily As  needed)    fluticasone (FLONASE) 50 mcg/act nasal spray 1 spray into each nostril daily    Garlic Oil 1000 MG CAPS Take 2 capsules by mouth daily    nitroglycerin (Nitrostat) 0.4 mg SL tablet Place 1 tablet (0.4 mg total) under the tongue every 5 (five) minutes as needed for chest pain    [DISCONTINUED] Cholecalciferol (Vitamin D3) 1.25 MG (75609 UT) CAPS Take 1 capsule by mouth once a week    [DISCONTINUED] ezetimibe (ZETIA) 10 mg tablet Take 1 tablet (10 mg total) by mouth daily    [DISCONTINUED] rosuvastatin (CRESTOR) 20 MG tablet Take 1 tablet by mouth once daily    [DISCONTINUED] methylPREDNISolone 4 MG tablet therapy pack Use as directed on package       Objective     /86 (BP Location: Left arm, Patient Position: Sitting, Cuff Size: Large)   Pulse 66   Temp 97.8 °F (36.6 °C)   Ht 5' 9\" (1.753 m)   Wt 117 kg (257 lb 12.8 oz)   SpO2 95%   BMI 38.07 kg/m²     Physical Exam  Vitals reviewed.   Constitutional:       Appearance: Normal appearance. He is obese.   HENT:      Head: Normocephalic and atraumatic.      Right Ear: Tympanic membrane, ear canal and external ear normal.      Left Ear: Tympanic membrane, ear canal and external ear normal.      Nose: Nose normal.      Mouth/Throat:      Mouth: Mucous membranes are moist.      Pharynx: Oropharynx is clear.   Eyes:      Extraocular Movements: Extraocular movements intact.      Conjunctiva/sclera: Conjunctivae normal.      Pupils: Pupils are equal, round, and reactive to light.   Cardiovascular:      Rate and Rhythm: Normal rate and regular rhythm.      Pulses: Normal pulses.      Heart sounds: Normal heart sounds.   Pulmonary:      " Effort: Pulmonary effort is normal.      Breath sounds: Normal breath sounds.   Abdominal:      General: Abdomen is flat. Bowel sounds are normal.      Palpations: Abdomen is soft.   Musculoskeletal:         General: Normal range of motion.      Cervical back: Normal range of motion and neck supple.   Skin:     General: Skin is warm and dry.      Capillary Refill: Capillary refill takes less than 2 seconds.   Neurological:      General: No focal deficit present.      Mental Status: He is alert and oriented to person, place, and time. Mental status is at baseline.   Psychiatric:         Mood and Affect: Mood normal.         Behavior: Behavior normal.         Thought Content: Thought content normal.         Judgment: Judgment normal.       YUNI Wilkinson

## 2024-05-07 ENCOUNTER — TELEPHONE (OUTPATIENT)
Age: 69
End: 2024-05-07

## 2024-05-07 NOTE — TELEPHONE ENCOUNTER
Caller: Manda/ [facility]     Doctor: Dr. Robledo     Reason for call: Manda from [facility] is requesting the last office note of patient faxed to: [phone].    Call back#: [phone]

## 2024-05-13 NOTE — TELEPHONE ENCOUNTER
As a follow-up, a second attempt has been made for outreach via fax to facility. Please see Contacts section for details.    Thank you  Dania Demarco    O-L Flap Text: The defect edges were debeveled with a #15 scalpel blade.  Given the location of the defect, shape of the defect and the proximity to free margins an O-L flap was deemed most appropriate.  Using a sterile surgical marker, an appropriate advancement flap was drawn incorporating the defect and placing the expected incisions within the relaxed skin tension lines where possible.    The area thus outlined was incised deep to adipose tissue with a #15 scalpel blade.  The skin margins were undermined to an appropriate distance in all directions utilizing iris scissors.

## 2024-05-23 NOTE — TELEPHONE ENCOUNTER
As a final attempt, a third outreach has been made via telephone call to facility. Please see Contacts section for details. This encounter will be closed and completed by end of day. Should we receive the requested information because of previous outreach attempts, the requested patient's chart will be updated appropriately.     Thank you  Dania Demarco

## 2024-07-01 ENCOUNTER — TELEPHONE (OUTPATIENT)
Age: 69
End: 2024-07-01

## 2024-07-01 DIAGNOSIS — Z01.818 PREOP TESTING: Primary | ICD-10-CM

## 2024-07-01 NOTE — TELEPHONE ENCOUNTER
Sheryl, spouse of pt called in returning call to reschedule appt.     Pre-Op appt rescheduled for : 07/19    Sheryl stated that pt will also need an EKG done at the appt as well. Request if [facility] can please put in order for pt EKG.     Please advise & call Sheryl with update on EKG. Thank you!    Sheryl Hale   [phone]

## 2024-07-03 ENCOUNTER — OFFICE VISIT (OUTPATIENT)
Dept: LAB | Facility: HOSPITAL | Age: 69
End: 2024-07-03
Payer: MEDICARE

## 2024-07-03 DIAGNOSIS — Z01.818 PREOP TESTING: ICD-10-CM

## 2024-07-03 LAB
ATRIAL RATE: 59 BPM
P AXIS: 38 DEGREES
PR INTERVAL: 192 MS
QRS AXIS: 34 DEGREES
QRSD INTERVAL: 76 MS
QT INTERVAL: 422 MS
QTC INTERVAL: 417 MS
T WAVE AXIS: 59 DEGREES
VENTRICULAR RATE: 59 BPM

## 2024-07-03 PROCEDURE — 93005 ELECTROCARDIOGRAM TRACING: CPT

## 2024-07-03 PROCEDURE — 93010 ELECTROCARDIOGRAM REPORT: CPT | Performed by: INTERNAL MEDICINE

## 2024-07-11 ENCOUNTER — TELEPHONE (OUTPATIENT)
Age: 69
End: 2024-07-11

## 2024-07-11 ENCOUNTER — CONSULT (OUTPATIENT)
Dept: INTERNAL MEDICINE CLINIC | Facility: CLINIC | Age: 69
End: 2024-07-11
Payer: MEDICARE

## 2024-07-11 VITALS
OXYGEN SATURATION: 98 % | TEMPERATURE: 98 F | DIASTOLIC BLOOD PRESSURE: 70 MMHG | HEART RATE: 58 BPM | SYSTOLIC BLOOD PRESSURE: 150 MMHG | BODY MASS INDEX: 38.49 KG/M2 | WEIGHT: 259.9 LBS | HEIGHT: 69 IN

## 2024-07-11 DIAGNOSIS — Z01.818 PREOPERATIVE CLEARANCE: Primary | ICD-10-CM

## 2024-07-11 DIAGNOSIS — H35.371 EPIRETINAL MEMBRANE (ERM) OF RIGHT EYE: ICD-10-CM

## 2024-07-11 PROCEDURE — 99213 OFFICE O/P EST LOW 20 MIN: CPT | Performed by: NURSE PRACTITIONER

## 2024-07-11 PROCEDURE — G2211 COMPLEX E/M VISIT ADD ON: HCPCS | Performed by: NURSE PRACTITIONER

## 2024-07-11 NOTE — TELEPHONE ENCOUNTER
Manda from [facility] called form for evaluation was fax with no EKG need actual picture of EKG to be fax back to them [phone].

## 2024-07-11 NOTE — PROGRESS NOTES
Ambulatory Visit  Name: Javy Hale      : 1955      MRN: [id number]  Encounter Provider: YUNI Wilkinson  Encounter Date: 2024   Encounter department: [facility]    Assessment & Plan   1. Preoperative clearance  2. Epiretinal membrane (ERM) of right eye     Patient cleared for surgery. Will follow up for his wellness.    History of Present Illness     Kulwinder is for a preop clearance. He is having surgery on  for puckering of the macula right eye. He is having a vitrectomy with membrane peel. He did have his EKG done and is ready for surgery. He offers no other issues.        Review of Systems   All other systems reviewed and are negative.      Objective     There were no vitals taken for this visit.    Physical Exam  Vitals reviewed.   Constitutional:       Appearance: Normal appearance. He is normal weight.   HENT:      Head: Normocephalic and atraumatic.      Right Ear: Tympanic membrane, ear canal and external ear normal.      Left Ear: Tympanic membrane, ear canal and external ear normal.      Nose: Nose normal.      Mouth/Throat:      Mouth: Mucous membranes are moist.      Pharynx: Oropharynx is clear.   Eyes:      Extraocular Movements: Extraocular movements intact.      Conjunctiva/sclera: Conjunctivae normal.      Pupils: Pupils are equal, round, and reactive to light.   Cardiovascular:      Rate and Rhythm: Normal rate and regular rhythm.      Pulses: Normal pulses.      Heart sounds: Normal heart sounds.   Pulmonary:      Effort: Pulmonary effort is normal.      Breath sounds: Normal breath sounds.   Abdominal:      General: Abdomen is flat. Bowel sounds are normal.      Palpations: Abdomen is soft.   Musculoskeletal:         General: Normal range of motion.      Cervical back: Normal range of motion and neck supple.   Skin:     General: Skin is warm and dry.      Capillary Refill: Capillary refill takes less than 2 seconds.   Neurological:       General: No focal deficit present.      Mental Status: He is alert and oriented to person, place, and time. Mental status is at baseline.   Psychiatric:         Mood and Affect: Mood normal.         Behavior: Behavior normal.         Thought Content: Thought content normal.         Judgment: Judgment normal.       Administrative Statements

## 2024-08-19 ENCOUNTER — TELEPHONE (OUTPATIENT)
Age: 69
End: 2024-08-19

## 2024-08-19 NOTE — TELEPHONE ENCOUNTER
Patient called. Has been taking Baclofen 20mg and motrin.electric shots up his back. back tightens up. Back spasms. Patient can't walk.  Wants to know if there is anything [facility] advises.       Sheryl Hale (Spouse)  [phone] (Mobile)

## 2024-08-20 ENCOUNTER — HOSPITAL ENCOUNTER (EMERGENCY)
Facility: HOSPITAL | Age: 69
Discharge: HOME | End: 2024-08-20
Attending: EMERGENCY MEDICINE | Admitting: EMERGENCY MEDICINE
Payer: MEDICARE

## 2024-08-20 ENCOUNTER — APPOINTMENT (EMERGENCY)
Dept: RADIOLOGY | Facility: HOSPITAL | Age: 69
End: 2024-08-20
Payer: MEDICARE

## 2024-08-20 VITALS
RESPIRATION RATE: 18 BRPM | DIASTOLIC BLOOD PRESSURE: 92 MMHG | HEART RATE: 57 BPM | OXYGEN SATURATION: 94 % | TEMPERATURE: 97 F | SYSTOLIC BLOOD PRESSURE: 188 MMHG

## 2024-08-20 DIAGNOSIS — M54.50 LOW BACK PAIN: ICD-10-CM

## 2024-08-20 DIAGNOSIS — M54.50 ACUTE LOW BACK PAIN, UNSPECIFIED BACK PAIN LATERALITY, UNSPECIFIED WHETHER SCIATICA PRESENT: Primary | ICD-10-CM

## 2024-08-20 DIAGNOSIS — M62.838 MUSCLE SPASM: Primary | ICD-10-CM

## 2024-08-20 PROCEDURE — 99283 EMERGENCY DEPT VISIT LOW MDM: CPT

## 2024-08-20 PROCEDURE — 72100 X-RAY EXAM L-S SPINE 2/3 VWS: CPT

## 2024-08-20 PROCEDURE — 96372 THER/PROPH/DIAG INJ SC/IM: CPT

## 2024-08-20 PROCEDURE — 99284 EMERGENCY DEPT VISIT MOD MDM: CPT | Performed by: PHYSICIAN ASSISTANT

## 2024-08-20 RX ORDER — PREDNISONE 10 MG/1
TABLET ORAL
Qty: 26 TABLET | Refills: 0 | Status: SHIPPED | OUTPATIENT
Start: 2024-08-21 | End: 2024-09-01

## 2024-08-20 RX ORDER — METHOCARBAMOL 500 MG/1
500 TABLET, FILM COATED ORAL 2 TIMES DAILY PRN
Qty: 30 TABLET | Refills: 0 | Status: SHIPPED | OUTPATIENT
Start: 2024-08-20

## 2024-08-20 RX ORDER — KETOROLAC TROMETHAMINE 30 MG/ML
30 INJECTION, SOLUTION INTRAMUSCULAR; INTRAVENOUS ONCE
Status: COMPLETED | OUTPATIENT
Start: 2024-08-20 | End: 2024-08-20

## 2024-08-20 RX ORDER — DIAZEPAM 5 MG
5 TABLET ORAL ONCE
Status: COMPLETED | OUTPATIENT
Start: 2024-08-20 | End: 2024-08-20

## 2024-08-20 RX ORDER — PREDNISONE 20 MG/1
40 TABLET ORAL ONCE
Status: COMPLETED | OUTPATIENT
Start: 2024-08-20 | End: 2024-08-20

## 2024-08-20 RX ORDER — LIDOCAINE 50 MG/G
2 PATCH TOPICAL ONCE
Status: DISCONTINUED | OUTPATIENT
Start: 2024-08-20 | End: 2024-08-20 | Stop reason: HOSPADM

## 2024-08-20 RX ADMIN — DIAZEPAM 5 MG: 5 TABLET ORAL at 12:53

## 2024-08-20 RX ADMIN — KETOROLAC TROMETHAMINE 30 MG: 30 INJECTION, SOLUTION INTRAMUSCULAR at 12:52

## 2024-08-20 RX ADMIN — LIDOCAINE 2 PATCH: 700 PATCH TOPICAL at 12:55

## 2024-08-20 RX ADMIN — PREDNISONE 40 MG: 20 TABLET ORAL at 12:53

## 2024-08-20 NOTE — TELEPHONE ENCOUNTER
Pts wife called, she was getting ready to take Kulwinder to the hospital for x-rays, patient stood up and almost fell because his back seized up- he had to grab the closet.     As advised, did instruct pt to go to the ER for further evaluation.

## 2024-08-20 NOTE — ED PROVIDER NOTES
History  Chief Complaint   Patient presents with    Spasms     Pt states that he started with muscle spasms last night going across the lower back- Pt states that he was going yard work- riding a tractor and bending to  rocks. Pt denies any trauma      69 year old male with PMH HTN, preDM, CAD presenting for evaluation of back pain.  Pt reports pain started yesterday.  He describes spasms across lower back.  He denies specific injury/trauma or falls however notes he was recently working outside, moving rocks and riding his tractor (Figure 1).  He reports having a hard time straightening out his back.  Denies any radicular pain down the legs.  Denies numbness, tingling, weakness.  Denies fever, chills. Denies cough, congestion or recent illness.  Denies chest pain, SOB, dizziness, lightheadedness.  Denies N/V/D/C, abdominal pain.  Denies any urinary complaints.  Denies loss of bladder or bowel control.  Pain worse with movement. No reported alleviating factors.  He reports he took tylenol and also tried muscle relaxants (Rx bottles for flexeril and baclofen shown to me by spouse) without relief.  He did talk to PCP and was ordered xrays.  He did not have these done yet.  He feels the pain hasn't been getting better.      History provided by:  Patient and medical records   used: No        Prior to Admission Medications   Prescriptions Last Dose Informant Patient Reported? Taking?   Cholecalciferol (Vitamin D3) 1.25 MG (00738 UT) capsule   No No   Sig: Take 1 capsule (50,000 Units total) by mouth once a week   Garlic Oil 1000 MG CAPS  Self Yes No   Sig: Take 2 capsules by mouth daily   aspirin 81 MG tablet  Self Yes No   Sig: Take 81 mg by mouth daily.   betamethasone, augmented, (DIPROLENE) 0.05 % ointment   No No   Sig: Apply topically 2 (two) times a day   clindamycin (CLEOCIN) 150 mg capsule   Yes No   Sig: Take 600 mg by mouth as needed 1 hour prior to dental appointment   escitalopram  (Lexapro) 10 mg tablet  Self No No   Sig: Take 1 tablet (10 mg total) by mouth daily   Patient taking differently: Take 10 mg by mouth daily As  needed   ezetimibe (ZETIA) 10 mg tablet   No No   Sig: Take 1 tablet (10 mg total) by mouth daily   fluticasone (FLONASE) 50 mcg/act nasal spray   No No   Si spray into each nostril daily   nitroglycerin (Nitrostat) 0.4 mg SL tablet  Self No No   Sig: Place 1 tablet (0.4 mg total) under the tongue every 5 (five) minutes as needed for chest pain   rosuvastatin (CRESTOR) 20 MG tablet   No No   Sig: Take 1 tablet (20 mg total) by mouth daily      Facility-Administered Medications: None       Past Medical History:   Diagnosis Date    Cardiac disease     Hypertension     Myocardial infarction (HCC)     Pre-diabetes        Past Surgical History:   Procedure Laterality Date    CORONARY STENT PLACEMENT      HERNIA REPAIR      OTHER SURGICAL HISTORY  2010    Arterial catheterization    DE COLONOSCOPY FLX DX W/COLLJ SPEC WHEN PFRMD N/A 3/27/2019    Procedure: COLONOSCOPY;  Surgeon: Ngozi Richard MD;  Location: [facility];  Service: Gastroenterology    TONSILLECTOMY AND ADENOIDECTOMY      TOTAL HIP ARTHROPLASTY         Family History   Problem Relation Age of Onset    Breast cancer Mother     Coronary artery disease Mother     Diabetes Mother     Hyperlipidemia Mother     Atrial fibrillation Father      I have reviewed and agree with the history as documented.    E-Cigarette/Vaping    E-Cigarette Use Never User      E-Cigarette/Vaping Substances    Nicotine No     THC No     CBD No     Flavoring No     Other No     Unknown No      Social History     Tobacco Use    Smoking status: Never    Smokeless tobacco: Never   Vaping Use    Vaping status: Never Used   Substance Use Topics    Alcohol use: No    Drug use: No       Review of Systems   Constitutional: Negative.  Negative for chills, fatigue and fever.   HENT: Negative.  Negative for congestion, rhinorrhea and sore throat.     Eyes: Negative.  Negative for visual disturbance.   Respiratory: Negative.  Negative for cough, shortness of breath and wheezing.    Cardiovascular: Negative.  Negative for chest pain, palpitations and leg swelling.   Gastrointestinal: Negative.  Negative for abdominal pain, diarrhea, nausea and vomiting.   Genitourinary: Negative.  Negative for dysuria, flank pain, frequency and hematuria.   Musculoskeletal:  Positive for back pain. Negative for neck pain.   Skin: Negative.  Negative for rash.   Neurological: Negative.  Negative for dizziness, light-headedness, numbness and headaches.   Psychiatric/Behavioral: Negative.     All other systems reviewed and are negative.      Physical Exam  Physical Exam  Vitals and nursing note reviewed.   Constitutional:       General: He is awake. He is not in acute distress.     Appearance: Normal appearance. He is well-developed. He is obese. He is not toxic-appearing or diaphoretic.   HENT:      Head: Normocephalic and atraumatic.      Right Ear: Hearing and external ear normal.      Left Ear: Hearing and external ear normal.      Mouth/Throat:      Mouth: Mucous membranes are moist.      Pharynx: Oropharynx is clear.   Eyes:      General: Lids are normal. No scleral icterus.     Conjunctiva/sclera: Conjunctivae normal.   Neck:      Trachea: Trachea and phonation normal.   Cardiovascular:      Rate and Rhythm: Normal rate and regular rhythm.      Pulses: Normal pulses.           Radial pulses are 2+ on the right side and 2+ on the left side.        Dorsalis pedis pulses are 2+ on the right side and 2+ on the left side.        Posterior tibial pulses are 2+ on the right side and 2+ on the left side.      Heart sounds: Normal heart sounds, S1 normal and S2 normal.   Pulmonary:      Effort: Pulmonary effort is normal. No tachypnea or respiratory distress.      Breath sounds: Normal breath sounds. No wheezing, rhonchi or rales.   Abdominal:      General: Bowel sounds are normal.  There is no distension.      Palpations: Abdomen is soft.      Tenderness: There is no abdominal tenderness. There is no guarding or rebound.   Musculoskeletal:      Cervical back: Normal.      Thoracic back: Normal.      Lumbar back: Spasms and tenderness present. No swelling or signs of trauma.        Back:       Right lower leg: No edema.      Left lower leg: No edema.      Comments: Pt has tenderness across lumbar spine.  No swelling, no overlying skin changes.  Pt flexed forward and reported increased pain when trying to straighten out.   Skin:     General: Skin is warm and dry.      Capillary Refill: Capillary refill takes less than 2 seconds.      Findings: No abrasion, bruising or rash.   Neurological:      General: No focal deficit present.      Mental Status: He is alert and oriented to person, place, and time.      GCS: GCS eye subscore is 4. GCS verbal subscore is 5. GCS motor subscore is 6.      Sensory: Sensation is intact.      Motor: Motor function is intact.      Comments: Pt did ambulate to department unassisted but was bent forward and walked slowly.   Psychiatric:         Mood and Affect: Mood normal.         Speech: Speech normal.         Behavior: Behavior normal. Behavior is cooperative.         Vital Signs  ED Triage Vitals [08/20/24 1226]   Temperature Pulse Respirations Blood Pressure SpO2   (!) 97 °F (36.1 °C) (!) 51 18 (!) 188/92 97 %      Temp Source Heart Rate Source Patient Position - Orthostatic VS BP Location FiO2 (%)   Temporal Monitor Lying Right arm --      Pain Score       10 - Worst Possible Pain           Vitals:    08/20/24 1226 08/20/24 1336   BP: (!) 188/92    Pulse: (!) 51 57   Patient Position - Orthostatic VS: Lying          Visual Acuity      ED Medications  Medications   lidocaine (LIDODERM) 5 % patch 2 patch (2 patches Topical Medication Applied 8/20/24 1255)   diazepam (VALIUM) tablet 5 mg (5 mg Oral Given 8/20/24 1253)   predniSONE tablet 40 mg (40 mg Oral Given  8/20/24 1253)   ketorolac (TORADOL) injection 30 mg (30 mg Intramuscular Given 8/20/24 1252)       Diagnostic Studies  Results Reviewed       None                   XR spine lumbar 2 or 3 views injury    (Results Pending)              Procedures  Procedures         ED Course  ED Course as of 08/20/24 1431   Tue Aug 20, 2024   1301 XR spine lumbar 2 or 3 views injury  Independently viewed and interpreted by me - no fracture or acute osseous findings, mild degenerative changes; pending official read.   1336 Pt reassessed.  He is feeling much improved.  He definitely looks more comfortable.  He is comfortable with discharge and continued symptomatic management.                                 SBIRT 22yo+      Flowsheet Row Most Recent Value   Initial Alcohol Screen: US AUDIT-C     1. How often do you have a drink containing alcohol? 0 Filed at: 08/20/2024 1225   2. How many drinks containing alcohol do you have on a typical day you are drinking?  0 Filed at: 08/20/2024 1225   3a. Male UNDER 65: How often do you have five or more drinks on one occasion? 0 Filed at: 08/20/2024 1225   3b. FEMALE Any Age, or MALE 65+: How often do you have 4 or more drinks on one occassion? 0 Filed at: 08/20/2024 1225   Audit-C Score 0 Filed at: 08/20/2024 1225   SUSSY: How many times in the past year have you...    Used an illegal drug or used a prescription medication for non-medical reasons? Never Filed at: 08/20/2024 1225                      Medical Decision Making  68 yo male presenting for evaluation of low back pain.  He has been experiencing back spasms.  No specific injury/trauma but has been lifting rocks.  It appears his pain is related to muscle spasm vs lumbar strain.  Nonfocal neuro exam.  No findings to suggest cauda equina.  Will obtain xray that PCP ordered today.  Will provide symptomatic management.    No red flags on exam.  Discussed continued symptomatic/supportive care.  Advised to alternate ice/heat, topical muscle  rubs, lidocaine patches, etc.  Rx prednisone - advised to take as directed for the full duration with food.  Pt non-diabetic.  Rx muscle relaxant - sedation precautions advised and instructed no driving while taking.  Can supplement with OTC tylenol.  Strict return precautions outlined.  Amb ref to comprehensive spine.  Advised outpatient follow up with PCP or return to ER for change in condition as outlined. Pt verbalized understanding and had no further questions.  Pt left in stable, improved condition.    Please refer to above ER course for further details/discussion.    Problems Addressed:  Low back pain: acute illness or injury  Muscle spasm: acute illness or injury    Amount and/or Complexity of Data Reviewed  External Data Reviewed: labs and notes.  Radiology: ordered and independent interpretation performed. Decision-making details documented in ED Course.    Risk  OTC drugs.  Prescription drug management.                 Disposition  Final diagnoses:   Muscle spasm   Low back pain     Time reflects when diagnosis was documented in both MDM as applicable and the Disposition within this note       Time User Action Codes Description Comment    8/20/2024  1:37 PM Kristen Wise [M62.838] Muscle spasm     8/20/2024  1:37 PM Kristen Wise [M54.50] Low back pain           ED Disposition       ED Disposition   Discharge    Condition   Stable    Date/Time   Tue Aug 20, 2024 1337    Comment   Javy Hale discharge to home/self care.                   Follow-up Information       Follow up With Specialties Details Why Contact Info Additional Information    YUNI Wilkinson [facility], Nurse Practitioner Schedule an appointment as soon as possible for a visit   [address] [facility] [id number] [phone]       [facility] Emergency Department Emergency Medicine  As needed [address] [facility] [phone] [facility]  [facility] Emergency Department, [address], [place], [address]            Discharge Medication List as of 8/20/2024  1:39 PM        START taking these medications    Details   methocarbamol (ROBAXIN) 500 mg tablet Take 1 tablet (500 mg total) by mouth 2 (two) times a day as needed for muscle spasms, Starting Tue 8/20/2024, Normal      predniSONE 10 mg tablet Multiple Dosages:Starting Wed 8/21/2024, Until Thu 8/22/2024 at 2359, THEN Starting Fri 8/23/2024, Until Sun 8/25/2024 at 2359, THEN Starting Mon 8/26/2024, Until Wed 8/28/2024 at 2359, THEN Starting Thu 8/29/2024, Until Sat 8/31/2024 at 2359Take 4  tablets (40 mg total) by mouth daily for 2 days, THEN 3 tablets (30 mg total) daily for 3 days, THEN 2 tablets (20 mg total) daily for 3 days, THEN 1 tablet (10 mg total) daily for 3 days. Do not start before August 21, 2024., Normal           CONTINUE these medications which have NOT CHANGED    Details   aspirin 81 MG tablet Take 81 mg by mouth daily., Historical Med      betamethasone, augmented, (DIPROLENE) 0.05 % ointment Apply topically 2 (two) times a day, Starting Mon 2/12/2024, Normal      Cholecalciferol (Vitamin D3) 1.25 MG (76220 UT) capsule Take 1 capsule (50,000 Units total) by mouth once a week, Starting Mon 4/22/2024, Normal      clindamycin (CLEOCIN) 150 mg capsule Take 600 mg by mouth as needed 1 hour prior to dental appointment, Starting Wed 11/29/2023, Historical Med      escitalopram (Lexapro) 10 mg tablet Take 1 tablet (10 mg total) by mouth daily, Starting Thu 12/8/2022, Normal      ezetimibe (ZETIA) 10 mg tablet Take 1 tablet (10 mg total) by mouth daily, Starting Mon 4/22/2024, Normal      fluticasone (FLONASE) 50 mcg/act nasal spray 1 spray into each nostril daily, Starting Fri 11/24/2023, Normal      Garlic Oil 1000 MG CAPS Take 2 capsules by mouth daily, Historical Med      nitroglycerin (Nitrostat) 0.4 mg SL tablet Place 1 tablet (0.4 mg total) under the tongue every 5 (five)  minutes as needed for chest pain, Starting Tue 8/15/2023, Normal      rosuvastatin (CRESTOR) 20 MG tablet Take 1 tablet (20 mg total) by mouth daily, Starting Mon 4/22/2024, Normal                 PDMP Review       None            ED Provider  Electronically Signed by             Kristen Wise PA-C  08/20/24 4984

## 2024-08-20 NOTE — DISCHARGE INSTRUCTIONS
Continue to alternate ice/heat, topical muscle rubs, etc.  Take steroid as directed for the full duration.  Take with food to avoid upset stomach.  Caution sedation with muscle relaxant. No driving while taking.  Can supplement with OTC tylenol.  Follow up with PCP or return to ER as needed.

## 2024-08-21 ENCOUNTER — TELEPHONE (OUTPATIENT)
Dept: PHYSICAL THERAPY | Facility: OTHER | Age: 69
End: 2024-08-21

## 2024-10-29 DIAGNOSIS — E55.9 VITAMIN D DEFICIENCY: ICD-10-CM

## 2024-11-07 ENCOUNTER — TELEPHONE (OUTPATIENT)
Age: 69
End: 2024-11-07

## 2024-11-07 DIAGNOSIS — E78.5 DYSLIPIDEMIA: Primary | ICD-10-CM

## 2024-11-07 DIAGNOSIS — I10 BENIGN ESSENTIAL HYPERTENSION: ICD-10-CM

## 2024-11-07 NOTE — TELEPHONE ENCOUNTER
Patient has upcoming appointment on 11/18/24 sees blood for A1C , inquiring if he needs any other labs to be done before his visit?

## 2024-11-08 ENCOUNTER — RA CDI HCC (OUTPATIENT)
Dept: OTHER | Facility: HOSPITAL | Age: 69
End: 2024-11-08

## 2024-11-14 ENCOUNTER — TELEPHONE (OUTPATIENT)
Age: 69
End: 2024-11-14

## 2024-11-14 DIAGNOSIS — Z12.5 SCREENING FOR PROSTATE CANCER: ICD-10-CM

## 2024-11-14 DIAGNOSIS — E11.9 TYPE 2 DIABETES MELLITUS WITHOUT COMPLICATION, WITHOUT LONG-TERM CURRENT USE OF INSULIN (HCC): ICD-10-CM

## 2024-11-14 NOTE — TELEPHONE ENCOUNTER
Kulwinder is going to lab tomorrow.  Sheryl noticed that he has not had a PSA in over a year.      Patient is requesting addition of PSA total to lab orders    Please place if deemed appropriate.  Patient is going tomorrow in am.  They would be most appreciative    Thank you

## 2024-11-15 ENCOUNTER — APPOINTMENT (OUTPATIENT)
Dept: LAB | Facility: MEDICAL CENTER | Age: 69
End: 2024-11-15
Payer: MEDICARE

## 2024-11-15 DIAGNOSIS — I10 BENIGN ESSENTIAL HYPERTENSION: ICD-10-CM

## 2024-11-15 DIAGNOSIS — Z12.5 SCREENING FOR PROSTATE CANCER: ICD-10-CM

## 2024-11-15 DIAGNOSIS — E78.5 DYSLIPIDEMIA: ICD-10-CM

## 2024-11-15 LAB
ALBUMIN SERPL BCG-MCNC: 3.9 G/DL (ref 3.5–5)
ALP SERPL-CCNC: 37 U/L (ref 34–104)
ALT SERPL W P-5'-P-CCNC: 18 U/L (ref 7–52)
ANION GAP SERPL CALCULATED.3IONS-SCNC: 7 MMOL/L (ref 4–13)
AST SERPL W P-5'-P-CCNC: 24 U/L (ref 13–39)
B BURGDOR IGG+IGM SER QL IA: NEGATIVE
BASOPHILS # BLD AUTO: 0.03 THOUSANDS/ÂΜL (ref 0–0.1)
BASOPHILS NFR BLD AUTO: 1 % (ref 0–1)
BILIRUB SERPL-MCNC: 0.96 MG/DL (ref 0.2–1)
BUN SERPL-MCNC: 20 MG/DL (ref 5–25)
CALCIUM SERPL-MCNC: 8.5 MG/DL (ref 8.4–10.2)
CHLORIDE SERPL-SCNC: 107 MMOL/L (ref 96–108)
CHOLEST SERPL-MCNC: 126 MG/DL (ref ?–200)
CO2 SERPL-SCNC: 27 MMOL/L (ref 21–32)
CREAT SERPL-MCNC: 1.07 MG/DL (ref 0.6–1.3)
EOSINOPHIL # BLD AUTO: 0.36 THOUSAND/ÂΜL (ref 0–0.61)
EOSINOPHIL NFR BLD AUTO: 6 % (ref 0–6)
ERYTHROCYTE [DISTWIDTH] IN BLOOD BY AUTOMATED COUNT: 13.4 % (ref 11.6–15.1)
EST. AVERAGE GLUCOSE BLD GHB EST-MCNC: 148 MG/DL
GFR SERPL CREATININE-BSD FRML MDRD: 69 ML/MIN/1.73SQ M
GLUCOSE P FAST SERPL-MCNC: 117 MG/DL (ref 65–99)
HBA1C MFR BLD: 6.8 %
HCT VFR BLD AUTO: 46.4 % (ref 36.5–49.3)
HDLC SERPL-MCNC: 39 MG/DL
HGB BLD-MCNC: 15.5 G/DL (ref 12–17)
IMM GRANULOCYTES # BLD AUTO: 0.01 THOUSAND/UL (ref 0–0.2)
IMM GRANULOCYTES NFR BLD AUTO: 0 % (ref 0–2)
LDLC SERPL CALC-MCNC: 50 MG/DL (ref 0–100)
LYMPHOCYTES # BLD AUTO: 2.18 THOUSANDS/ÂΜL (ref 0.6–4.47)
LYMPHOCYTES NFR BLD AUTO: 39 % (ref 14–44)
MCH RBC QN AUTO: 30.3 PG (ref 26.8–34.3)
MCHC RBC AUTO-ENTMCNC: 33.4 G/DL (ref 31.4–37.4)
MCV RBC AUTO: 91 FL (ref 82–98)
MONOCYTES # BLD AUTO: 0.5 THOUSAND/ÂΜL (ref 0.17–1.22)
MONOCYTES NFR BLD AUTO: 9 % (ref 4–12)
NEUTROPHILS # BLD AUTO: 2.54 THOUSANDS/ÂΜL (ref 1.85–7.62)
NEUTS SEG NFR BLD AUTO: 45 % (ref 43–75)
NONHDLC SERPL-MCNC: 87 MG/DL
NRBC BLD AUTO-RTO: 0 /100 WBCS
PLATELET # BLD AUTO: 192 THOUSANDS/UL (ref 149–390)
PMV BLD AUTO: 11.8 FL (ref 8.9–12.7)
POTASSIUM SERPL-SCNC: 4.4 MMOL/L (ref 3.5–5.3)
PROT SERPL-MCNC: 6 G/DL (ref 6.4–8.4)
PSA SERPL-MCNC: 1.21 NG/ML (ref 0–4)
RBC # BLD AUTO: 5.12 MILLION/UL (ref 3.88–5.62)
SODIUM SERPL-SCNC: 141 MMOL/L (ref 135–147)
TRIGL SERPL-MCNC: 184 MG/DL (ref ?–150)
TSH SERPL DL<=0.05 MIU/L-ACNC: 3.08 UIU/ML (ref 0.45–4.5)
WBC # BLD AUTO: 5.62 THOUSAND/UL (ref 4.31–10.16)

## 2024-11-15 PROCEDURE — 80061 LIPID PANEL: CPT

## 2024-11-15 PROCEDURE — 84443 ASSAY THYROID STIM HORMONE: CPT

## 2024-11-15 PROCEDURE — 80053 COMPREHEN METABOLIC PANEL: CPT

## 2024-11-15 PROCEDURE — G0103 PSA SCREENING: HCPCS

## 2024-11-15 PROCEDURE — 85025 COMPLETE CBC W/AUTO DIFF WBC: CPT

## 2024-11-18 ENCOUNTER — OFFICE VISIT (OUTPATIENT)
Dept: INTERNAL MEDICINE CLINIC | Facility: CLINIC | Age: 69
End: 2024-11-18
Payer: MEDICARE

## 2024-11-18 VITALS
WEIGHT: 262.2 LBS | DIASTOLIC BLOOD PRESSURE: 80 MMHG | OXYGEN SATURATION: 95 % | SYSTOLIC BLOOD PRESSURE: 124 MMHG | HEIGHT: 69 IN | TEMPERATURE: 98 F | BODY MASS INDEX: 38.83 KG/M2 | HEART RATE: 59 BPM

## 2024-11-18 DIAGNOSIS — Z00.00 MEDICARE ANNUAL WELLNESS VISIT, SUBSEQUENT: Primary | ICD-10-CM

## 2024-11-18 DIAGNOSIS — E78.00 PURE HYPERCHOLESTEROLEMIA: ICD-10-CM

## 2024-11-18 DIAGNOSIS — I10 BENIGN ESSENTIAL HYPERTENSION: ICD-10-CM

## 2024-11-18 DIAGNOSIS — E55.9 VITAMIN D DEFICIENCY: ICD-10-CM

## 2024-11-18 DIAGNOSIS — E11.9 TYPE 2 DIABETES MELLITUS WITHOUT COMPLICATION, WITHOUT LONG-TERM CURRENT USE OF INSULIN (HCC): ICD-10-CM

## 2024-11-18 DIAGNOSIS — I25.10 ATHEROSCLEROSIS OF NATIVE CORONARY ARTERY OF NATIVE HEART WITHOUT ANGINA PECTORIS: ICD-10-CM

## 2024-11-18 PROBLEM — Z01.818 PREOPERATIVE CLEARANCE: Status: RESOLVED | Noted: 2020-11-06 | Resolved: 2024-11-18

## 2024-11-18 PROBLEM — M25.531 RIGHT WRIST PAIN: Status: RESOLVED | Noted: 2019-09-20 | Resolved: 2024-11-18

## 2024-11-18 LAB
CREAT UR-MCNC: 87 MG/DL
MICROALBUMIN UR-MCNC: <7 MG/L

## 2024-11-18 PROCEDURE — 82043 UR ALBUMIN QUANTITATIVE: CPT | Performed by: NURSE PRACTITIONER

## 2024-11-18 PROCEDURE — G0439 PPPS, SUBSEQ VISIT: HCPCS | Performed by: NURSE PRACTITIONER

## 2024-11-18 PROCEDURE — 99214 OFFICE O/P EST MOD 30 MIN: CPT | Performed by: NURSE PRACTITIONER

## 2024-11-18 PROCEDURE — 82570 ASSAY OF URINE CREATININE: CPT | Performed by: NURSE PRACTITIONER

## 2024-11-18 RX ORDER — CHOLECALCIFEROL (VITAMIN D3) 1250 MCG
50000 CAPSULE ORAL WEEKLY
Qty: 12 CAPSULE | Refills: 3 | Status: SHIPPED | OUTPATIENT
Start: 2024-11-18

## 2024-11-18 NOTE — ASSESSMENT & PLAN NOTE
Lab Results   Component Value Date    HGBA1C 6.8 (H) 11/15/2024       Orders:    Albumin / creatinine urine ratio; Future    Comprehensive metabolic panel; Future    CBC and differential; Future    TSH, 3rd generation with Free T4 reflex; Future    Hemoglobin A1C

## 2024-11-18 NOTE — ASSESSMENT & PLAN NOTE
Orders:    Comprehensive metabolic panel; Future    CBC and differential; Future    TSH, 3rd generation with Free T4 reflex; Future    Cholecalciferol (Vitamin D3) 1.25 MG (55887 UT) CAPS; Take 1 capsule (50,000 Units total) by mouth once a week

## 2024-11-18 NOTE — ASSESSMENT & PLAN NOTE
Orders:    Comprehensive metabolic panel; Future    CBC and differential; Future    TSH, 3rd generation with Free T4 reflex; Future    Lipid panel; Future

## 2024-11-18 NOTE — PROGRESS NOTES
Name: Javy Hale      : 1955      MRN: [id number]  Encounter Provider: YUNI Wilkinson  Encounter Date: 2024   Encounter department: [facility]  Medicare annual wellness visit, subsequent    Orders:    Comprehensive metabolic panel; Future    CBC and differential; Future    TSH, 3rd generation with Free T4 reflex; Future    Type 2 diabetes mellitus without complication, without long-term current use of insulin (HCC)    Lab Results   Component Value Date    HGBA1C 6.8 (H) 11/15/2024       Orders:    Albumin / creatinine urine ratio; Future    Comprehensive metabolic panel; Future    CBC and differential; Future    TSH, 3rd generation with Free T4 reflex; Future    Hemoglobin A1C    Atherosclerosis of native coronary artery of native heart without angina pectoris    Orders:    Comprehensive metabolic panel; Future    CBC and differential; Future    TSH, 3rd generation with Free T4 reflex; Future    Benign essential hypertension    Orders:    Comprehensive metabolic panel; Future    CBC and differential; Future    TSH, 3rd generation with Free T4 reflex; Future    Vitamin D deficiency    Orders:    Comprehensive metabolic panel; Future    CBC and differential; Future    TSH, 3rd generation with Free T4 reflex; Future    Cholecalciferol (Vitamin D3) 1.25 MG (65005 UT) CAPS; Take 1 capsule (50,000 Units total) by mouth once a week    Pure hypercholesterolemia    Orders:    Comprehensive metabolic panel; Future    CBC and differential; Future    TSH, 3rd generation with Free T4 reflex; Future    Lipid panel; Future     Did review labs. Deferring Flu vaccine. Continue to monitor A1C. Foot exam is normal. Will obtain urine. Will obtain eye exam. Bp stable. Will follow up in 6 months or sooner if need be      Preventive health issues were discussed with patient, and age appropriate screening tests were ordered as noted in patient's After Visit  Summary. Personalized health advice and appropriate referrals for health education or preventive services given if needed, as noted in patient's After Visit Summary.    History of Present Illness     Kulwinder is for a follow up and medicare wellness. He is doing well not having any issues. He is having cataract surgery coming up. He continues to see Ortho. He is deferring the flu vaccine. He is up to date on his screenings. He is having a small lump on his left chest and was told years ago it was a lipoma. He offers no other issues.       Patient Care Team:  YUNI Wilkinson as PCP - General (Family Medicine)  MD Neeraj Mane MD Joanne Calabrese, DO Gurshawn Singh, MD as Endoscopist    Review of Systems   Musculoskeletal:  Positive for arthralgias and myalgias.   All other systems reviewed and are negative.    Medical History Reviewed by provider this encounter:       Annual Wellness Visit Questionnaire   Javy is here for his Subsequent Wellness visit. Last Medicare Wellness visit information reviewed, patient interviewed and updates made to the record today.      Health Risk Assessment:   Patient rates overall health as very good. Patient feels that their physical health rating is same. Patient is satisfied with their life. Eyesight was rated as slightly worse. Hearing was rated as same. Patient feels that their emotional and mental health rating is same. Patients states they are sometimes angry. Patient states they are sometimes unusually tired/fatigued. Pain experienced in the last 7 days has been none. Patient states that he has experienced no weight loss or gain in last 6 months.     Depression Screening:   PHQ-2 Score: 0      Fall Risk Screening:   In the past year, patient has experienced: no history of falling in past year      Home Safety:  Patient does not have trouble with stairs inside or outside of their home. Patient has working smoke alarms and has working carbon monoxide  detector. Home safety hazards include: none.     Nutrition:   Current diet is Regular.     Medications:   Patient is not currently taking any over-the-counter supplements. Patient is able to manage medications.     Activities of Daily Living (ADLs)/Instrumental Activities of Daily Living (IADLs):   Walk and transfer into and out of bed and chair?: Yes  Dress and groom yourself?: Yes    Bathe or shower yourself?: Yes    Feed yourself? Yes  Do your laundry/housekeeping?: Yes  Manage your money, pay your bills and track your expenses?: Yes  Make your own meals?: Yes    Do your own shopping?: Yes    Previous Hospitalizations:   Any hospitalizations or ED visits within the last 12 months?: No      Advance Care Planning:   Living will: Yes    Durable POA for healthcare: Yes    Advanced directive: Yes    Advanced directive counseling given: No    Five wishes given: No    Patient declined ACP directive: No    End of Life Decisions reviewed with patient: No    Provider agrees with end of life decisions: Yes      PREVENTIVE SCREENINGS      Cardiovascular Screening:    General: History Lipid Disorder, Risks and Benefits Discussed and Screening Current      Diabetes Screening:     General: History Diabetes and Risks and Benefits Discussed      Colorectal Cancer Screening:     General: Screening Current      Prostate Cancer Screening:    General: Screening Current and Risks and Benefits Discussed      Osteoporosis Screening:    General: Screening Not Indicated      Abdominal Aortic Aneurysm (AAA) Screening:    Risk factors include: age between 65-76 yo        Lung Cancer Screening:     General: Screening Not Indicated      Hepatitis C Screening:    General: Screening Current    Screening, Brief Intervention, and Referral to Treatment (SBIRT)    Screening  Typical number of drinks in a day: 0  Typical number of drinks in a week: 0  Interpretation: Low risk drinking behavior.    Single Item Drug Screening:  How often have you used  an illegal drug (including marijuana) or a prescription medication for non-medical reasons in the past year? never    Single Item Drug Screen Score: 0  Interpretation: Negative screen for possible drug use disorder    Patient's shoes and socks removed.    Right Foot/Ankle   Right Foot Inspection  Skin Exam: skin normal and skin intact. No dry skin, no warmth, no callus, no erythema, no maceration, no abnormal color, no pre-ulcer, no ulcer and no callus.     Toe Exam: ROM and strength within normal limits.     Sensory   Vibration: intact  Proprioception: intact  Monofilament testing: intact    Vascular  Capillary refills: < 3 seconds  The right DP pulse is 2+. The right PT pulse is 2+.     Left Foot/Ankle  Left Foot Inspection  Skin Exam: skin normal and skin intact. No dry skin, no warmth, no erythema, no maceration, normal color, no pre-ulcer, no ulcer and no callus.     Toe Exam: ROM and strength within normal limits.     Sensory   Vibration: intact  Proprioception: intact  Monofilament testing: intact    Vascular  Capillary refills: < 3 seconds  The left DP pulse is 2+. The left PT pulse is 2+.     Assign Risk Category  No deformity present  No loss of protective sensation  No weak pulses  Risk: 0     Social Drivers of Health     Financial Resource Strain: Low Risk  (11/14/2023)    Overall Financial Resource Strain (CARDIA)     Difficulty of Paying Living Expenses: Not hard at all   Food Insecurity: No Food Insecurity (11/18/2024)    Hunger Vital Sign     Worried About Running Out of Food in the Last Year: Never true     Ran Out of Food in the Last Year: Never true   Transportation Needs: No Transportation Needs (11/18/2024)    PRAPARE - Transportation     Lack of Transportation (Medical): No     Lack of Transportation (Non-Medical): No   Housing Stability: Low Risk  (11/18/2024)    Housing Stability Vital Sign     Unable to Pay for Housing in the Last Year: No     Number of Times Moved in the Last Year: 0      "Homeless in the Last Year: No   Utilities: Not At Risk (11/18/2024)    [facility] Utilities     Threatened with loss of utilities: No     No results found.    Objective   /80 (BP Location: Right arm, Patient Position: Sitting)   Pulse 59   Temp 98 °F (36.7 °C) (Temporal)   Ht 5' 9\" (1.753 m)   Wt 119 kg (262 lb 3.2 oz)   SpO2 95%   BMI 38.72 kg/m²     Physical Exam  Vitals reviewed.   Constitutional:       Appearance: Normal appearance. He is obese.   HENT:      Head: Normocephalic and atraumatic.      Right Ear: Tympanic membrane, ear canal and external ear normal.      Left Ear: Tympanic membrane, ear canal and external ear normal.      Nose: Nose normal.      Mouth/Throat:      Mouth: Mucous membranes are moist.      Pharynx: Oropharynx is clear.   Eyes:      Extraocular Movements: Extraocular movements intact.      Conjunctiva/sclera: Conjunctivae normal.      Pupils: Pupils are equal, round, and reactive to light.   Cardiovascular:      Rate and Rhythm: Normal rate and regular rhythm.      Pulses: Normal pulses. no weak pulses.           Dorsalis pedis pulses are 2+ on the right side and 2+ on the left side.        Posterior tibial pulses are 2+ on the right side and 2+ on the left side.      Heart sounds: Normal heart sounds.   Pulmonary:      Effort: Pulmonary effort is normal.      Breath sounds: Normal breath sounds.   Abdominal:      General: Abdomen is flat. Bowel sounds are normal.      Palpations: Abdomen is soft.   Musculoskeletal:         General: Normal range of motion.      Cervical back: Normal range of motion and neck supple.   Feet:      Right foot:      Skin integrity: No ulcer, skin breakdown, erythema, warmth, callus or dry skin.      Left foot:      Skin integrity: No ulcer, skin breakdown, erythema, warmth, callus or dry skin.   Skin:     General: Skin is warm and dry.      Capillary Refill: Capillary refill takes less than 2 seconds.   Neurological:      General: No focal deficit " present.      Mental Status: He is alert and oriented to person, place, and time. Mental status is at baseline.   Psychiatric:         Mood and Affect: Mood normal.         Behavior: Behavior normal.         Thought Content: Thought content normal.         Judgment: Judgment normal.

## 2024-12-18 PROBLEM — Z00.00 MEDICARE ANNUAL WELLNESS VISIT, SUBSEQUENT: Status: RESOLVED | Noted: 2024-11-18 | Resolved: 2024-12-18

## 2025-01-13 ENCOUNTER — OFFICE VISIT (OUTPATIENT)
Dept: SLEEP CENTER | Facility: CLINIC | Age: 70
End: 2025-01-13
Payer: MEDICARE

## 2025-01-13 VITALS
HEART RATE: 59 BPM | SYSTOLIC BLOOD PRESSURE: 145 MMHG | BODY MASS INDEX: 39.4 KG/M2 | HEIGHT: 69 IN | RESPIRATION RATE: 16 BRPM | TEMPERATURE: 97.9 F | DIASTOLIC BLOOD PRESSURE: 92 MMHG | WEIGHT: 266 LBS | OXYGEN SATURATION: 98 %

## 2025-01-13 DIAGNOSIS — I25.119 ATHEROSCLEROSIS OF CORONARY ARTERY WITH ANGINA PECTORIS, UNSPECIFIED VESSEL OR LESION TYPE, UNSPECIFIED WHETHER NATIVE OR TRANSPLANTED HEART (HCC): ICD-10-CM

## 2025-01-13 DIAGNOSIS — E66.9 OBESITY (BMI 30-39.9): ICD-10-CM

## 2025-01-13 DIAGNOSIS — G47.09 OTHER INSOMNIA: ICD-10-CM

## 2025-01-13 DIAGNOSIS — F41.9 ANXIETY: ICD-10-CM

## 2025-01-13 DIAGNOSIS — I10 BENIGN ESSENTIAL HYPERTENSION: ICD-10-CM

## 2025-01-13 DIAGNOSIS — G47.33 OSA (OBSTRUCTIVE SLEEP APNEA): Primary | ICD-10-CM

## 2025-01-13 PROCEDURE — 99214 OFFICE O/P EST MOD 30 MIN: CPT | Performed by: INTERNAL MEDICINE

## 2025-01-13 NOTE — PROGRESS NOTES
Name: Javy Hale      : 1955      MRN: [id number]  Encounter Provider: Jun Lloyd MD  Encounter Date: 2025   [facility]  :  Assessment & Plan  LIZ (obstructive sleep apnea)    Orders:    PAP DME Resupply/Reorder    PAP DME Pressure Change    Other insomnia         Anxiety         Benign essential hypertension         Atherosclerosis of coronary artery with angina pectoris, unspecified vessel or lesion type, unspecified whether native or transplanted heart (HCC)         Obesity (BMI 30-39.9)       PLAN - Problem List Items & Comorbidities Addressed this Visit :  1. I reviewed results of prior studies and physiologic data with the patient.   2.  I discussed treatment options with risks and benefits.  3. Treatment with  PAP is medically necessary and [unfilled] is agreable to continue use.   4. Care of equipment, methods to improve comfort using PAP and importance of compliance with therapy were discussed.  5. Pressure setting: change 6-9 cmH2O using a nasal interface.    6. Rx provided to replace supplies and Care coordinated with DME provider.   7.  Multi component Cognitive behavioral therapy for Insomnia undertaken - Sleep Restriction, Stimulus control, Relaxation techniques and Sleep hygiene were discussed.    8.  Discussed strategies for weight reduction.    9. Follow-up is advised in 1 year or sooner if needed to monitor progress, compliance and to adjust therapy.      History of Present Illness   HPI            Follow-Up Note - Sleep Center   Javy Hale  69 y.o. male  :1955  MRN:[id number]  DOS:2025    CC: I saw this patient for follow-up in clinic today for Sleep disordered breathing, Coexisting Sleep and Medical Problems.  He is using a Ashtyn machine. Interval changes: None reported.      Home sleep study on 2022 with an DONALD of 17/h with minimum oxygen saturation of 84% and 5.6% of the study was spent with saturations below  "90%. He was then set up with an auto CPAP on 7/28/2022.     PFSH, Problem List, Medications & Allergies were reviewed in EMR.   He  has a past medical history of Allergic, Arthritis, Cardiac disease, Coronary artery disease, Hypertension, Myocardial infarction (HCC), Obesity, and Pre-diabetes.    He has a current medication list which includes the following prescription(s): aspirin, betamethasone (augmented), vitamin d3, clindamycin, escitalopram, ezetimibe, fluticasone, garlic oil, nitroglycerin, rosuvastatin, vitamin d3, and methocarbamol.    PHYSIOLOGICAL DATA REVIEW : Using PAP > 4 hours/night 97%. Estimated DONALD 1.7/hour with pressure of 8.1cm H2O@90th/95th percentile;.  INTERPRETATION: Compliance is excellent; Pressure setting is:optimal; ;     SUBJECTIVE: With respect to use of PAP, Javy  is experiencing minimal adverse effects: mask causes redness / facial marks .He derives benefit.. Is satisfied with sleep and daytime function.     Sleep Routine: Javy reports getting (Proxy-Rptd) (P) 6 hrs sleep; he has no difficulty initiating, but reports diffculty maintaining sleep .  He reports racing thoughts due to psychosocial stresses.  He arises before alarm most days and is not always refreshed.Javy (Proxy-Rptd) (P) Sometimes]reports episodic excessive daytime sleepiness, dozes off when sedentary at home.  He rated himself at Total score: (Proxy-Rptd) (P) 5 /24 on the [place] Sleepiness Scale.   Other issues: None reported.     Habits: Reports that he has never smoked. He has never used smokeless tobacco.,  Reports no history of alcohol use.,  Reports no history of drug use., Caffeine use: rarely until  ; Exercise routine: none.      ROS: Significant for weight fluctuates in the range of a few pounds.  Review of systems was otherwise negative..    EXAM: /92 (BP Location: Right arm, Patient Position: Sitting, Cuff Size: Large)   Pulse 59   Temp 97.9 °F (36.6 °C) (Temporal)   Resp 16   Ht 5' 9\" (1.753 " "m)   Wt 121 kg (266 lb)   SpO2 98%   BMI 39.28 kg/m²     Wt Readings from Last 3 Encounters:   01/13/25 121 kg (266 lb)   11/18/24 119 kg (262 lb 3.2 oz)   07/11/24 118 kg (259 lb 14.4 oz)      Patient is well groomed; well appearing.   CNS: Alert, orientated, speech clear & coherent  Psych: cooperative and in no distress. Mental state: Appears normal.  H&N: EOMI; NC/AT: No facial pressure marks, no rashes.    Skin/Extrem: col & hydration normal; no edema  Resp: Respiratory effort is normal  Physical findings otherwise essentially unchanged from previous.    Thank you for allowing me to participate in the care of this patient.    Sincerely,     Authenticated electronically on 01/13/25   Board Certified Specialist     Portions of the record may have been created with voice recognition software. Occasional wrong word or \"sound a like\" substitutions may have occurred due to the inherent limitations of voice recognition software. There may also be notations and random deletions of words or characters from malfunctioning software. Read the chart carefully and recognize, using context, where substitutions/deletions have occurred.      Sitting and reading: (Proxy-Rptd) (P) Slight chance of dozing  Watching TV: (Proxy-Rptd) (P) Slight chance of dozing  Sitting, inactive in a public place (e.g. a theatre or a meeting): (Proxy-Rptd) (P) Would never doze  As a passenger in a car for an hour without a break: (Proxy-Rptd) (P) Would never doze  Lying down to rest in the afternoon when circumstances permit: (Proxy-Rptd) (P) Moderate chance of dozing  Sitting and talking to someone: (Proxy-Rptd) (P) Would never doze  Sitting quietly after a lunch without alcohol: (Proxy-Rptd) (P) Slight chance of dozing  In a car, while stopped for a few minutes in traffic: (Proxy-Rptd) (P) Would never doze  Total score: (Proxy-Rptd) (P) 5     Review of Systems  Pertinent positives/negatives included in HPI and also as noted:       Objective " "  /92 (BP Location: Right arm, Patient Position: Sitting, Cuff Size: Large)   Pulse 59   Temp 97.9 °F (36.6 °C) (Temporal)   Resp 16   Ht 5' 9\" (1.753 m)   Wt 121 kg (266 lb)   SpO2 98%   BMI 39.28 kg/m²     Neck Circumference: 18.5  Physical Exam    Data  Lab Results   Component Value Date    HGB 15.5 11/15/2024    HCT 46.4 11/15/2024    MCV 91 11/15/2024      Lab Results   Component Value Date    GLUCOSE 112 04/02/2015    CALCIUM 8.5 11/15/2024     (L) 04/02/2015    K 4.4 11/15/2024    CO2 27 11/15/2024     11/15/2024    BUN 20 11/15/2024    CREATININE 1.07 11/15/2024     No results found for: \"IRON\", \"TIBC\", \"FERRITIN\"  Lab Results   Component Value Date    AST 24 11/15/2024    ALT 18 11/15/2024         "

## 2025-01-14 ENCOUNTER — TELEPHONE (OUTPATIENT)
Dept: SLEEP CENTER | Facility: CLINIC | Age: 70
End: 2025-01-14

## 2025-01-15 ENCOUNTER — PATIENT MESSAGE (OUTPATIENT)
Dept: SLEEP CENTER | Facility: CLINIC | Age: 70
End: 2025-01-15

## 2025-01-15 NOTE — PATIENT COMMUNICATION
Call to patient spoke with patient.     Scheduled for mask fitting appointment tomorrow in the [place] office at 10 am.  Added to notes it is for a pressure change. Patient bringing equipment.

## 2025-01-16 ENCOUNTER — CONSULT (OUTPATIENT)
Dept: INTERNAL MEDICINE CLINIC | Facility: CLINIC | Age: 70
End: 2025-01-16
Payer: MEDICARE

## 2025-01-16 ENCOUNTER — TELEPHONE (OUTPATIENT)
Dept: ADMINISTRATIVE | Facility: OTHER | Age: 70
End: 2025-01-16

## 2025-01-16 VITALS
HEART RATE: 68 BPM | SYSTOLIC BLOOD PRESSURE: 155 MMHG | BODY MASS INDEX: 39.09 KG/M2 | HEIGHT: 69 IN | DIASTOLIC BLOOD PRESSURE: 82 MMHG | WEIGHT: 263.9 LBS | OXYGEN SATURATION: 97 %

## 2025-01-16 DIAGNOSIS — Z01.818 PREOPERATIVE CLEARANCE: Primary | ICD-10-CM

## 2025-01-16 DIAGNOSIS — H25.9 AGE-RELATED CATARACT OF BOTH EYES, UNSPECIFIED AGE-RELATED CATARACT TYPE: ICD-10-CM

## 2025-01-16 PROCEDURE — 99213 OFFICE O/P EST LOW 20 MIN: CPT | Performed by: NURSE PRACTITIONER

## 2025-01-16 NOTE — TELEPHONE ENCOUNTER
Upon review of the In Basket request and the patient's chart, initial outreach has been made via fax to facility. Please see Contacts section for details.     X1 eye    Thank you  Renata Spears

## 2025-01-16 NOTE — PROGRESS NOTES
"Name: Javy Hale      : 1955      MRN: [id number]  Encounter Provider: YUNI Wilkinson  Encounter Date: 2025   Encounter department: [facility]  :  Assessment & Plan  Preoperative clearance         Age-related cataract of both eyes, unspecified age-related cataract type       Will clear for cataract surgery. Did fill form out. BP up on exam will check when home does monitor at home.         History of Present Illness     Kulwinder is for a preop clearance. He is having the right eye done on the  and the left eye done on the . He does have his paperwork and did get his eye drops. He otherwise is doing well.      Review of Systems   All other systems reviewed and are negative.      Objective   /82   Pulse 68   Ht 5' 9\" (1.753 m)   Wt 120 kg (263 lb 14.4 oz)   SpO2 97%   BMI 38.97 kg/m²      Physical Exam  Vitals reviewed.   Constitutional:       Appearance: Normal appearance. He is obese.   HENT:      Head: Normocephalic and atraumatic.      Right Ear: Tympanic membrane, ear canal and external ear normal.      Left Ear: Tympanic membrane, ear canal and external ear normal.      Nose: Nose normal.      Mouth/Throat:      Mouth: Mucous membranes are moist.      Pharynx: Oropharynx is clear.   Eyes:      Extraocular Movements: Extraocular movements intact.      Conjunctiva/sclera: Conjunctivae normal.      Pupils: Pupils are equal, round, and reactive to light.   Cardiovascular:      Rate and Rhythm: Normal rate and regular rhythm.      Pulses: Normal pulses.      Heart sounds: Normal heart sounds.   Pulmonary:      Effort: Pulmonary effort is normal.      Breath sounds: Normal breath sounds.   Abdominal:      General: Abdomen is flat. Bowel sounds are normal.      Palpations: Abdomen is soft.   Musculoskeletal:         General: Normal range of motion.      Cervical back: Normal range of motion and neck supple.   Skin:     General: Skin is warm and " dry.      Capillary Refill: Capillary refill takes less than 2 seconds.   Neurological:      General: No focal deficit present.      Mental Status: He is alert and oriented to person, place, and time. Mental status is at baseline.   Psychiatric:         Mood and Affect: Mood normal.         Behavior: Behavior normal.         Thought Content: Thought content normal.         Judgment: Judgment normal.

## 2025-01-16 NOTE — ASSESSMENT & PLAN NOTE
Will clear for cataract surgery. Did fill form out. BP up on exam will check when home does monitor at home.

## 2025-01-16 NOTE — TELEPHONE ENCOUNTER
----- Message from Juliann NARAYANAN sent at 1/16/2025  7:22 AM EST -----  01/16/25 7:22 AM    Hello, our patient Javy Hale has had Diabetic Eye Exam completed/performed. Please assist in updating the patient chart by making an External outreach to [facility] located in [place]. The date of service is December 2023.    Thank you,  Juliann Roberson MA [facility] [place]

## 2025-01-17 LAB

## 2025-01-17 NOTE — TELEPHONE ENCOUNTER
Upon review of the In Basket request we have found as a result of outreach that patient did not have the requested item(s) completed.     Any additional questions or concerns should be emailed to the Practice Liaisons via the appropriate education email address, please do not reply via In Basket.    Thank you  Renata Spears   [facility]

## 2025-01-20 LAB
DME PARACHUTE DELIVERY DATE REQUESTED: NORMAL
DME PARACHUTE DELIVERY DATE REQUESTED: NORMAL
DME PARACHUTE ITEM DESCRIPTION: NORMAL
DME PARACHUTE ORDER STATUS: NORMAL
DME PARACHUTE ORDER STATUS: NORMAL
DME PARACHUTE SUPPLIER NAME: NORMAL
DME PARACHUTE SUPPLIER NAME: NORMAL
DME PARACHUTE SUPPLIER PHONE: NORMAL
DME PARACHUTE SUPPLIER PHONE: NORMAL

## 2025-01-27 ENCOUNTER — APPOINTMENT (OUTPATIENT)
Dept: LAB | Facility: MEDICAL CENTER | Age: 70
End: 2025-01-27
Payer: MEDICARE

## 2025-01-27 DIAGNOSIS — Z96.643 HISTORY OF BILATERAL HIP REPLACEMENTS: Primary | ICD-10-CM

## 2025-01-27 DIAGNOSIS — I25.10 ATHEROSCLEROSIS OF NATIVE CORONARY ARTERY OF NATIVE HEART WITHOUT ANGINA PECTORIS: ICD-10-CM

## 2025-01-27 DIAGNOSIS — E11.9 TYPE 2 DIABETES MELLITUS WITHOUT COMPLICATION, WITHOUT LONG-TERM CURRENT USE OF INSULIN (HCC): ICD-10-CM

## 2025-01-27 DIAGNOSIS — M25.551 BILATERAL HIP PAIN: ICD-10-CM

## 2025-01-27 DIAGNOSIS — Z00.00 MEDICARE ANNUAL WELLNESS VISIT, SUBSEQUENT: ICD-10-CM

## 2025-01-27 DIAGNOSIS — E78.00 PURE HYPERCHOLESTEROLEMIA: ICD-10-CM

## 2025-01-27 DIAGNOSIS — E55.9 VITAMIN D DEFICIENCY: ICD-10-CM

## 2025-01-27 DIAGNOSIS — I10 BENIGN ESSENTIAL HYPERTENSION: ICD-10-CM

## 2025-01-27 DIAGNOSIS — M25.552 BILATERAL HIP PAIN: ICD-10-CM

## 2025-01-27 LAB
CRP SERPL QL: <1 MG/L
ERYTHROCYTE [SEDIMENTATION RATE] IN BLOOD: 5 MM/HOUR (ref 0–19)

## 2025-01-27 PROCEDURE — 86140 C-REACTIVE PROTEIN: CPT

## 2025-01-27 PROCEDURE — 36415 COLL VENOUS BLD VENIPUNCTURE: CPT

## 2025-01-27 PROCEDURE — 85652 RBC SED RATE AUTOMATED: CPT

## 2025-02-03 ENCOUNTER — OFFICE VISIT (OUTPATIENT)
Dept: CARDIOLOGY CLINIC | Facility: HOSPITAL | Age: 70
End: 2025-02-03
Payer: MEDICARE

## 2025-02-03 VITALS
WEIGHT: 265 LBS | HEIGHT: 69 IN | HEART RATE: 71 BPM | DIASTOLIC BLOOD PRESSURE: 78 MMHG | BODY MASS INDEX: 39.25 KG/M2 | SYSTOLIC BLOOD PRESSURE: 134 MMHG | OXYGEN SATURATION: 97 %

## 2025-02-03 DIAGNOSIS — E66.01 MORBID OBESITY WITH BMI OF 40.0-44.9, ADULT (HCC): ICD-10-CM

## 2025-02-03 DIAGNOSIS — I25.119 ATHEROSCLEROSIS OF CORONARY ARTERY WITH ANGINA PECTORIS, UNSPECIFIED VESSEL OR LESION TYPE, UNSPECIFIED WHETHER NATIVE OR TRANSPLANTED HEART (HCC): ICD-10-CM

## 2025-02-03 DIAGNOSIS — I65.21 CAROTID STENOSIS, RIGHT: ICD-10-CM

## 2025-02-03 DIAGNOSIS — E11.9 TYPE 2 DIABETES MELLITUS WITHOUT COMPLICATION, WITHOUT LONG-TERM CURRENT USE OF INSULIN (HCC): ICD-10-CM

## 2025-02-03 DIAGNOSIS — I10 BENIGN ESSENTIAL HYPERTENSION: Primary | ICD-10-CM

## 2025-02-03 PROCEDURE — 99214 OFFICE O/P EST MOD 30 MIN: CPT | Performed by: INTERNAL MEDICINE

## 2025-02-04 NOTE — PROGRESS NOTES
Cardiology Follow Up    Javy Hale  1955 [id number]  [facility] [address] [address] [phone]    1. Benign essential hypertension  Echo complete w/ contrast if indicated      2. Atherosclerosis of coronary artery with angina pectoris, unspecified vessel or lesion type, unspecified whether native or transplanted heart ([facility])  Echo complete w/ contrast if indicated      3. Morbid obesity with BMI of 40.0-44.9, adult ([facility])        4. Type 2 diabetes mellitus without complication, without long-term current use of insulin ([facility])        5. Carotid stenosis, right            Discussion/Summary:  1. Coronary artery disease with PCI of LAD in 2010, moderate residual circ and RCA disease  2. Hypertension  3. Hyperlipidemia  4. Probable obstructive sleep apnea  5. Morbid obesity  6. Peripheral arterial disease  7. Dyspnea on exertion    Recommendations: Overall he is doing well.  Coronary disease is stable no complaints of active angina.  Blood pressure is controlled lipids are doing well.  We talked about getting an echocardiogram this year for follow-up on LV function and pulmonary artery pressures.  He will be due for carotid Doppler in 2026.  Continue with diet and exercise.      Interval History:  Very pleasant 66-year-old gentleman with a history of coronary artery disease as described above, hypertension, hyperlipidemia, obesity presents for new patient consultation on behalf of Afua MORRISSEY for management of coronary artery disease.  Over the past year he has had some shortness of breath with physical activity and walking up an incline.  He gets some occasional chest pain it which is located over left anterior chest wall he has been told this is musculoskeletal in the past.  He has not had any cardiac workup recently.  He denies any lower extremity edema, PND, orthopnea.      He  remains active around the house doing some odd jobs.  Unfortunately has some orthopedic issues with his hip that he needs to work on.  Denies any chest pain or exertional limitations.  There is been no shortness of breath, palpitations, lightheadedness, dizziness, or syncope.  There has been no lower extremity edema, PND, orthopnea.    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)    Colon cancer screening    Overview Signed 3/7/2019 11:54 AM by Caroline Anderson     Added automatically from request for surgery [id number]         Right wrist pain    BMI 38.0-38.9,adult    Impingement syndrome of right shoulder    COVID-19    Prediabetes    Daytime hypersomnolence    Morbid obesity with BMI of 40.0-44.9, adult (HCC)                  Past Medical History:   Diagnosis Date    Allergic     Arthritis     Cardiac disease     Coronary artery disease     Hypertension     Myocardial infarction (HCC)     Obesity     Pre-diabetes      Social History     Socioeconomic History    Marital status: /Civil Union     Spouse name: Not on file    Number of children: Not on file    Years of education: Not on file    Highest education level: Not on file   Occupational History    Not on file    Tobacco Use    Smoking status: Never    Smokeless tobacco: Never   Vaping Use    Vaping status: Never Used   Substance and Sexual Activity    Alcohol use: No    Drug use: No    Sexual activity: Yes     Partners: Female     Comment:  wife   Other Topics Concern    Not on file   Social History Narrative    Dental care, regularly    Good dental hygiene    No caffeine use    Sun protection sunscreen     Social Drivers of Health     Financial Resource Strain: Low Risk  (11/14/2023)    Overall Financial Resource Strain (CARDIA)     Difficulty of Paying Living Expenses: Not hard at all   Food Insecurity: No Food Insecurity (11/18/2024)    Hunger Vital Sign     Worried About Running Out of Food in the Last Year: Never true     Ran Out of Food in the Last Year: Never true   Transportation Needs: No Transportation Needs (11/18/2024)    PRAPARE - Transportation     Lack of Transportation (Medical): No     Lack of Transportation (Non-Medical): No   Physical Activity: Not on file   Stress: Not on file   Social Connections: Not on file   Intimate Partner Violence: Not on file   Housing Stability: Low Risk  (11/18/2024)    Housing Stability Vital Sign     Unable to Pay for Housing in the Last Year: No     Number of Times Moved in the Last Year: 0     Homeless in the Last Year: No      Family History   Problem Relation Age of Onset    Breast cancer Mother     Coronary artery disease Mother     Diabetes Mother     Hyperlipidemia Mother     Hypertension Mother     Heart disease Mother     Arthritis Mother     Cancer Mother     Atrial fibrillation Father     Heart disease Father     Arthritis Father      Past Surgical History:   Procedure Laterality Date    CARDIAC SURGERY      CORONARY STENT PLACEMENT      HERNIA REPAIR      JOINT REPLACEMENT  1995&1996    OTHER SURGICAL HISTORY  09/18/2010    Arterial catheterization    FL COLONOSCOPY FLX DX W/COLLJ SPEC WHEN PFRMD N/A 03/27/2019    Procedure: COLONOSCOPY;  Surgeon:  Ngozi Richard MD;  Location: [facility];  Service: Gastroenterology    TONSILLECTOMY AND ADENOIDECTOMY      TOTAL HIP ARTHROPLASTY         Current Outpatient Medications:     aspirin 81 MG tablet, Take 81 mg by mouth daily., Disp: , Rfl:     Cholecalciferol (Vitamin D3) 1.25 MG (77047 UT) CAPS, Take 1 capsule (50,000 Units total) by mouth once a week, Disp: 12 capsule, Rfl: 3    ezetimibe (ZETIA) 10 mg tablet, Take 1 tablet (10 mg total) by mouth daily, Disp: 90 tablet, Rfl: 3    fluticasone (FLONASE) 50 mcg/act nasal spray, 1 spray into each nostril daily, Disp: 11.1 mL, Rfl: 0    Garlic Oil 1000 MG CAPS, Take 2 capsules by mouth daily, Disp: , Rfl:     nitroglycerin (Nitrostat) 0.4 mg SL tablet, Place 1 tablet (0.4 mg total) under the tongue every 5 (five) minutes as needed for chest pain, Disp: 25 tablet, Rfl: 3    rosuvastatin (CRESTOR) 20 MG tablet, Take 1 tablet (20 mg total) by mouth daily, Disp: 90 tablet, Rfl: 3    clindamycin (CLEOCIN) 150 mg capsule, Take 600 mg by mouth as needed 1 hour prior to dental appointment (Patient not taking: Reported on 1/16/2025), Disp: , Rfl:     escitalopram (Lexapro) 10 mg tablet, Take 1 tablet (10 mg total) by mouth daily (Patient not taking: Reported on 1/16/2025), Disp: 30 tablet, Rfl: 5    methocarbamol (ROBAXIN) 500 mg tablet, Take 1 tablet (500 mg total) by mouth 2 (two) times a day as needed for muscle spasms (Patient not taking: Reported on 11/18/2024), Disp: 30 tablet, Rfl: 0  Allergies   Allergen Reactions    Erythromycin Base Diarrhea    Other Other (See Comments)     HIGH DOSE ASPIRINS    Nose bleeds    Oxycodone-Acetaminophen Other (See Comments)     hallucinations    Percocet [Oxycodone-Acetaminophen] Other (See Comments)     hallucinations    Penicillins Hives and Rash       Labs:     Chemistry        Component Value Date/Time     (L) 04/02/2015 1435    K 4.4 11/15/2024 0744    K 3.8 04/02/2015 1435     11/15/2024 0744    CL 98 04/02/2015 1435  "   CO2 27 11/15/2024 0744    CO2 26.1 04/02/2015 1435    BUN 20 11/15/2024 0744    BUN 15 04/02/2015 1435    CREATININE 1.07 11/15/2024 0744    CREATININE 1.06 04/02/2015 1435        Component Value Date/Time    CALCIUM 8.5 11/15/2024 0744    CALCIUM 8.1 (L) 04/02/2015 1435    ALKPHOS 37 11/15/2024 0744    ALKPHOS 50 04/02/2015 1435    AST 24 11/15/2024 0744    AST 37 04/02/2015 1435    ALT 18 11/15/2024 0744    ALT 45 04/02/2015 1435    BILITOT 0.5 04/02/2015 1435            Lab Results   Component Value Date    CHOL 173 03/26/2015    CHOL 199 08/11/2014     Lab Results   Component Value Date    HDL 39 (L) 11/15/2024    HDL 46 11/09/2023    HDL 46 04/05/2023     Lab Results   Component Value Date    LDLCALC 50 11/15/2024    LDLCALC 58 11/09/2023    LDLCALC 68 04/05/2023     Lab Results   Component Value Date    TRIG 184 (H) 11/15/2024    TRIG 130 11/09/2023    TRIG 118 04/05/2023     No results found for: \"CHOLHDL\"    Imaging: No results found.    ECG:  Normal sinus rhythm      Review of Systems   Constitutional: Negative.   HENT: Negative.     Eyes: Negative.    Cardiovascular: Negative.  Negative for chest pain, dyspnea on exertion, leg swelling, near-syncope, orthopnea, palpitations, paroxysmal nocturnal dyspnea and syncope.   Respiratory: Negative.     Endocrine: Negative.    Hematologic/Lymphatic: Negative.    Skin: Negative.    Musculoskeletal: Negative.    Gastrointestinal: Negative.    Genitourinary: Negative.    Neurological: Negative.    Psychiatric/Behavioral: Negative.     All other systems reviewed and are negative.      Vitals:    02/03/25 1424   BP: 134/78   Pulse: 71   SpO2: 97%     Vitals:    02/03/25 1424   Weight: 120 kg (265 lb)     Height: 5' 9\" (175.3 cm)   Body mass index is 39.13 kg/m².    Physical Exam:  Vital signs reviewed  General:  Alert and cooperative, appears stated age, no acute distress  HEENT:  PERRLA, EOMI, no scleral icterus, no conjunctival pallor  Neck:  No lymphadenopathy, no " thyromegaly, no carotid bruits, no elevated JVP  Heart:  Regular rate and rhythm, normal S1/S2, no S3/S4, no murmur, rubs or gallops.  PMI nondisplaced  Lungs:  Clear to auscultation bilaterally, no wheezes rales or rhonchi  Abdomen:  Soft, non-tender, positive bowel sounds, no rebound or guarding,   no organomegaly   Extremities:  Normal range of motion.  No clubbing, cyanosis or edema   Vascular:  2+ pedal pulses  Skin:  No rashes or lesions on exposed skin  Neurologic:  Cranial nerves II-XII grossly intact without focal deficits  Psych:  Normal mood and affect

## 2025-03-18 ENCOUNTER — OFFICE VISIT (OUTPATIENT)
Dept: URGENT CARE | Facility: MEDICAL CENTER | Age: 70
End: 2025-03-18
Payer: MEDICARE

## 2025-03-18 VITALS
RESPIRATION RATE: 14 BRPM | HEIGHT: 68 IN | WEIGHT: 269.2 LBS | HEART RATE: 84 BPM | DIASTOLIC BLOOD PRESSURE: 82 MMHG | TEMPERATURE: 97.5 F | OXYGEN SATURATION: 97 % | SYSTOLIC BLOOD PRESSURE: 124 MMHG | BODY MASS INDEX: 40.8 KG/M2

## 2025-03-18 DIAGNOSIS — B35.4 TINEA CORPORIS: Primary | ICD-10-CM

## 2025-03-18 PROCEDURE — 99213 OFFICE O/P EST LOW 20 MIN: CPT

## 2025-03-18 PROCEDURE — G0463 HOSPITAL OUTPT CLINIC VISIT: HCPCS

## 2025-03-18 RX ORDER — PREDNISOLONE ACETATE 10 MG/ML
SUSPENSION/ DROPS OPHTHALMIC
COMMUNITY
Start: 2025-02-07

## 2025-03-18 RX ORDER — CLOTRIMAZOLE 1 %
CREAM (GRAM) TOPICAL 2 TIMES DAILY
Qty: 14 G | Refills: 0 | Status: SHIPPED | OUTPATIENT
Start: 2025-03-18

## 2025-03-18 NOTE — PATIENT INSTRUCTIONS
Apply antifungal cream twice a day for 7 days.  Follow up with PCP in 1 week.  Avoid scratching when possible.

## 2025-03-18 NOTE — PROGRESS NOTES
[facility]        NAME: Javy Hale is a 69 y.o. male  : 1955    MRN: [id number]  DATE: 2025  TIME: 9:00 AM    Assessment and Plan   Tinea corporis [B35.4]  1. Tinea corporis  clotrimazole (LOTRIMIN) 1 % cream        Circular itchy rash to back of right calf. Finished Keflex course with no improvement.   Suspect tinea corporis. Will treat with Lotrimin cream. Patient to follow up with PCP if no improvement.    Patient Instructions   Apply antifungal cream twice a day for 7 days.  Follow up with PCP in 1 week.  Avoid scratching when possible.    Follow up with PCP in 3-5 days.  Proceed to  ER if symptoms worsen.    If tests have been performed at [facility], our office will contact you with results if changes need to be made to the care plan discussed with you at the visit.  [facility].    Chief Complaint     Chief Complaint   Patient presents with    Leg wound     Started last month. Sort of looks like ring worm. Got Cefelex from pcp and finished, didn't help.   Right leg.  Red, round, itchy.           History of Present Illness       68yo male with Hx of diabetes presents with a circular rash to the back of his right leg for the last month. He reports it is very itchy. He called his PCP and was prescribed a course of keflex which he completed with no improvement. He also reports small bubbles appeared that burst when something rubs against them. He has been scratching at the area. He has also tried cortisone cream and alcohol with no improvement.         Review of Systems   Review of Systems   Constitutional:  Negative for chills and fever.   Respiratory:  Negative for shortness of breath.    Cardiovascular:  Negative for chest pain.   Skin:  Positive for rash.         Current Medications       Current Outpatient Medications:     aspirin 81 MG tablet, Take 81 mg by mouth daily., Disp: , Rfl:     Cholecalciferol (Vitamin D3) 1.25 MG (09850  UT) CAPS, Take 1 capsule (50,000 Units total) by mouth once a week, Disp: 12 capsule, Rfl: 3    clotrimazole (LOTRIMIN) 1 % cream, Apply topically 2 (two) times a day, Disp: 14 g, Rfl: 0    ezetimibe (ZETIA) 10 mg tablet, Take 1 tablet (10 mg total) by mouth daily, Disp: 90 tablet, Rfl: 3    fluticasone (FLONASE) 50 mcg/act nasal spray, 1 spray into each nostril daily, Disp: 11.1 mL, Rfl: 0    Garlic Oil 1000 MG CAPS, Take 2 capsules by mouth daily, Disp: , Rfl:     nitroglycerin (Nitrostat) 0.4 mg SL tablet, Place 1 tablet (0.4 mg total) under the tongue every 5 (five) minutes as needed for chest pain, Disp: 25 tablet, Rfl: 3    prednisoLONE acetate (PRED FORTE) 1 % ophthalmic suspension, INSTILL 1 DROP EVERY 2 HOURS INTO LEFT EYE TO BE STARTED AFTER SURGERY IS COMPLETED (LEFT EYE: 2/13/2025), Disp: , Rfl:     rosuvastatin (CRESTOR) 20 MG tablet, Take 1 tablet (20 mg total) by mouth daily, Disp: 90 tablet, Rfl: 3    clindamycin (CLEOCIN) 150 mg capsule, Take 600 mg by mouth as needed 1 hour prior to dental appointment (Patient not taking: Reported on 1/16/2025), Disp: , Rfl:     escitalopram (Lexapro) 10 mg tablet, Take 1 tablet (10 mg total) by mouth daily (Patient not taking: Reported on 1/16/2025), Disp: 30 tablet, Rfl: 5    methocarbamol (ROBAXIN) 500 mg tablet, Take 1 tablet (500 mg total) by mouth 2 (two) times a day as needed for muscle spasms (Patient not taking: Reported on 11/18/2024), Disp: 30 tablet, Rfl: 0    Current Allergies     Allergies as of 03/18/2025 - Reviewed 03/18/2025   Allergen Reaction Noted    Erythromycin base Diarrhea 03/05/2019    Other Other (See Comments)     Oxycodone-acetaminophen Other (See Comments) 03/18/2025    Percocet [oxycodone-acetaminophen] Other (See Comments) 01/27/2016    Penicillins Hives and Rash 01/05/2012            The following portions of the patient's history were reviewed and updated as appropriate: allergies, current medications, past family history, past  "medical history, past social history, past surgical history and problem list.     Past Medical History:   Diagnosis Date    Allergic     Arthritis     Cardiac disease     Coronary artery disease     Hypertension     Myocardial infarction (HCC)     Obesity     Pre-diabetes        Past Surgical History:   Procedure Laterality Date    CARDIAC SURGERY      CORONARY STENT PLACEMENT      HERNIA REPAIR      JOINT REPLACEMENT  1995&1996    OTHER SURGICAL HISTORY  09/18/2010    Arterial catheterization    SD COLONOSCOPY FLX DX W/COLLJ SPEC WHEN PFRMD N/A 03/27/2019    Procedure: COLONOSCOPY;  Surgeon: Ngozi Richard MD;  Location: [facility];  Service: Gastroenterology    TONSILLECTOMY AND ADENOIDECTOMY      TOTAL HIP ARTHROPLASTY         Family History   Problem Relation Age of Onset    Breast cancer Mother     Coronary artery disease Mother     Diabetes Mother     Hyperlipidemia Mother     Hypertension Mother     Heart disease Mother     Arthritis Mother     Cancer Mother     Atrial fibrillation Father     Heart disease Father     Arthritis Father          Medications have been verified.        Objective   /82   Pulse 84   Temp 97.5 °F (36.4 °C)   Resp 14   Ht 5' 8\" (1.727 m)   Wt 122 kg (269 lb 3.2 oz)   SpO2 97%   BMI 40.93 kg/m²   No LMP for male patient.       Physical Exam     Physical Exam  Vitals and nursing note reviewed.   Constitutional:       General: He is not in acute distress.     Appearance: Normal appearance. He is not ill-appearing, toxic-appearing or diaphoretic.   HENT:      Head: Normocephalic and atraumatic.      Right Ear: External ear normal.      Left Ear: External ear normal.      Nose: Nose normal.   Eyes:      Conjunctiva/sclera: Conjunctivae normal.   Pulmonary:      Effort: Pulmonary effort is normal.   Skin:     General: Skin is warm and dry.      Capillary Refill: Capillary refill takes less than 2 seconds.      Findings: Rash present. Rash is macular, papular and vesicular. "             Comments: Circular ring of erythema about 6 cm in diameter to back of right calf. Burst vesicles along the perimeter. No warmth to the touch. No surrounding erythema or streaking. Picture attached.   Neurological:      General: No focal deficit present.      Mental Status: He is alert.   Psychiatric:         Mood and Affect: Mood normal.         Behavior: Behavior normal.

## 2025-04-11 ENCOUNTER — TELEPHONE (OUTPATIENT)
Age: 70
End: 2025-04-11

## 2025-04-11 NOTE — TELEPHONE ENCOUNTER
Wife called stating patient has a rash on right calf and was prescribed Keflex which he finished and then was seen at Urgent care and prescribed an anti-fungal cream. States he is almost finished with the anti-fungal cream and the rash is lighter but still there and he is starting with another other spot above that one.     Wife asking if he should schedule an appointment to be seen in the office now, wait until his upcoming appointment on 4/24/25 with PCP or have a referral to dermatology?    Please advise

## 2025-04-14 DIAGNOSIS — R21 RASH: Primary | ICD-10-CM

## 2025-04-14 RX ORDER — FLUCONAZOLE 150 MG/1
150 TABLET ORAL ONCE
Qty: 1 TABLET | Refills: 0 | Status: SHIPPED | OUTPATIENT
Start: 2025-04-14 | End: 2025-04-14

## 2025-04-24 ENCOUNTER — TELEPHONE (OUTPATIENT)
Dept: ADMINISTRATIVE | Facility: OTHER | Age: 70
End: 2025-04-24

## 2025-04-24 ENCOUNTER — OFFICE VISIT (OUTPATIENT)
Dept: INTERNAL MEDICINE CLINIC | Facility: CLINIC | Age: 70
End: 2025-04-24
Payer: MEDICARE

## 2025-04-24 VITALS
OXYGEN SATURATION: 93 % | WEIGHT: 263 LBS | BODY MASS INDEX: 38.95 KG/M2 | DIASTOLIC BLOOD PRESSURE: 90 MMHG | HEIGHT: 69 IN | TEMPERATURE: 97.9 F | SYSTOLIC BLOOD PRESSURE: 150 MMHG | HEART RATE: 68 BPM

## 2025-04-24 DIAGNOSIS — E78.2 MIXED HYPERLIPIDEMIA: ICD-10-CM

## 2025-04-24 DIAGNOSIS — E11.9 TYPE 2 DIABETES MELLITUS WITHOUT COMPLICATION, WITHOUT LONG-TERM CURRENT USE OF INSULIN (HCC): ICD-10-CM

## 2025-04-24 DIAGNOSIS — I10 BENIGN ESSENTIAL HYPERTENSION: ICD-10-CM

## 2025-04-24 DIAGNOSIS — H91.93 BILATERAL HEARING LOSS, UNSPECIFIED HEARING LOSS TYPE: ICD-10-CM

## 2025-04-24 DIAGNOSIS — I25.119 ATHEROSCLEROSIS OF CORONARY ARTERY WITH ANGINA PECTORIS, UNSPECIFIED VESSEL OR LESION TYPE, UNSPECIFIED WHETHER NATIVE OR TRANSPLANTED HEART (HCC): Primary | ICD-10-CM

## 2025-04-24 PROBLEM — Z01.818 PREOPERATIVE CLEARANCE: Status: RESOLVED | Noted: 2025-01-16 | Resolved: 2025-04-24

## 2025-04-24 PROCEDURE — 99214 OFFICE O/P EST MOD 30 MIN: CPT | Performed by: NURSE PRACTITIONER

## 2025-04-24 PROCEDURE — G2211 COMPLEX E/M VISIT ADD ON: HCPCS | Performed by: NURSE PRACTITIONER

## 2025-04-24 NOTE — TELEPHONE ENCOUNTER
----- Message from Nadine PAVON sent at 4/24/2025  8:21 AM EDT -----  04/24/25 8:21 AM    Hello, our patient Javy Hale has had Diabetic Foot Exam completed/performed. Patient was seen at [facility]  on [address]. The date of service is April of 2025..     Thank you,  Nadine Corrigan MA   PRIMARY CARE [place]

## 2025-04-24 NOTE — TELEPHONE ENCOUNTER
Upon review of the In Basket request and the patient's chart, initial outreach has been made via telephone call to facility. Please see Contacts section for details.     Dm foot x1    Thank you  Renata Spears

## 2025-04-24 NOTE — ASSESSMENT & PLAN NOTE
Lab Results   Component Value Date    HGBA1C 6.8 (H) 11/15/2024     Will have repeat labs done and will obtain eye exam other measures are UTD

## 2025-04-24 NOTE — LETTER
Diabetic Foot Exam Form    Date Requested: 25  Patient: Javy Hale  Patient : 1955   Referring Provider: YUNI Wilkinson    Diabetic Foot Exam Performed with shoes and socks removed        Yes         No     Date of Diabetic Foot Exam ______________________________  Risk Score ____________________________________________    Left Foot       Visual Inspection         Monofilament Testing Sensory Exam        Pedal Pulses         Additional Comments         Right Foot      Visual Inspection         Monofilament Testing Sensory Exam       Pedal Pulses         Additional Comments         Comments __________________________________________________________    Practice Providing Exam ______________________________________________    Exam Performed By (print name) _______________________________________      Provider Signature ___________________________________________________      These reports are needed for  compliance.    Please fax this completed form and a copy of the Diabetic Foot Exam report to the [facility] Department as soon as possible via   Fax [phone], jeremi Yanez: Phone [phone]. Our office is located at [address] [place] [address].    We thank you for your assistance in treating our mutual patient.

## 2025-04-24 NOTE — TELEPHONE ENCOUNTER
----- Message from Nadine PAVON sent at 4/24/2025  8:29 AM EDT -----  04/24/25 8:31 AM    Hello, our patient Javy Hale has had Diabetic Eye Exam completed/performed at [facility]. Please assist in updating the patient chart . The date of service is April 2025.    Thank you,  Nadine Corrigan MA   PRIMARY CARE [place]

## 2025-04-24 NOTE — TELEPHONE ENCOUNTER
Upon review of the In Basket request we have found as a result of outreach that the patient did not have the requested item(s) completed or the patient was not seen by the practice. See notes eye    Any additional questions or concerns should be emailed to the Practice Liaisons via the appropriate education email address, please do not reply via In Basket.    Thank you  Renata Spears   [facility]

## 2025-04-24 NOTE — PROGRESS NOTES
"Name: Javy Hale      : 1955      MRN: [id number]  Encounter Provider: YUNI Wilkinson  Encounter Date: 2025   Encounter department: [facility]  :  Assessment & Plan  Atherosclerosis of coronary artery with angina pectoris, unspecified vessel or lesion type, unspecified whether native or transplanted heart (HCC)         Benign essential hypertension         Type 2 diabetes mellitus without complication, without long-term current use of insulin (HCC)    Lab Results   Component Value Date    HGBA1C 6.8 (H) 11/15/2024     Will have repeat labs done and will obtain eye exam other measures are UTD       Mixed hyperlipidemia         Bilateral hearing loss, unspecified hearing loss type    Orders:    Ambulatory Referral to Otolaryngology; Future    Will refer to ENT.    Continues to see Cardiology and Ortho    Will follow up for his medicare       History of Present Illness   Amrita is for a follow up visit. He is doing well and is not having any issues. He is seeing Ortho in the Summer for his hip. He is doing well from a cardiac standpoint. Denies any chest pain, SOB, or palpitations. He did not have his labs done yet. He did have an eye exam done with Bruce Young. He is having some hearing issues and has pressure in the right ear. He offers no other issues.      Review of Systems   HENT:  Positive for hearing loss.    All other systems reviewed and are negative.      Objective   /90 (BP Location: Left arm, Patient Position: Sitting, Cuff Size: Standard)   Pulse 68   Temp 97.9 °F (36.6 °C) (Temporal)   Ht 5' 9\" (1.753 m)   Wt 119 kg (263 lb)   SpO2 93%   BMI 38.84 kg/m²      Physical Exam  Vitals reviewed.   Constitutional:       Appearance: Normal appearance. He is obese.   HENT:      Head: Normocephalic.      Right Ear: Tympanic membrane, ear canal and external ear normal.      Left Ear: Tympanic membrane, ear canal and external ear normal.      Nose: " Nose normal.      Mouth/Throat:      Mouth: Mucous membranes are moist.      Pharynx: Oropharynx is clear.   Eyes:      Extraocular Movements: Extraocular movements intact.      Conjunctiva/sclera: Conjunctivae normal.      Pupils: Pupils are equal, round, and reactive to light.   Cardiovascular:      Rate and Rhythm: Normal rate and regular rhythm.      Pulses: Normal pulses.      Heart sounds: Normal heart sounds.   Pulmonary:      Effort: Pulmonary effort is normal.      Breath sounds: Normal breath sounds.   Abdominal:      General: Abdomen is flat. Bowel sounds are normal.      Palpations: Abdomen is soft.   Musculoskeletal:         General: Normal range of motion.      Cervical back: Normal range of motion and neck supple.   Skin:     General: Skin is warm and dry.      Capillary Refill: Capillary refill takes less than 2 seconds.   Neurological:      General: No focal deficit present.      Mental Status: He is alert and oriented to person, place, and time. Mental status is at baseline.   Psychiatric:         Mood and Affect: Mood normal.         Behavior: Behavior normal.         Thought Content: Thought content normal.         Judgment: Judgment normal.

## 2025-05-01 NOTE — TELEPHONE ENCOUNTER
As a follow-up, a second attempt has been made for outreach via fax to facility. Please see Contacts section for details.    X2 foot    Thank you  Renata Spears

## 2025-05-23 DIAGNOSIS — E55.9 VITAMIN D DEFICIENCY: ICD-10-CM

## 2025-05-23 DIAGNOSIS — I25.10 ATHEROSCLEROSIS OF NATIVE CORONARY ARTERY WITHOUT ANGINA PECTORIS, UNSPECIFIED WHETHER NATIVE OR TRANSPLANTED HEART: ICD-10-CM

## 2025-05-25 RX ORDER — NITROGLYCERIN 0.4 MG/1
0.4 TABLET SUBLINGUAL
Qty: 25 TABLET | Refills: 0 | Status: SHIPPED | OUTPATIENT
Start: 2025-05-25

## 2025-05-25 RX ORDER — CHOLECALCIFEROL (VITAMIN D3) 1250 MCG
50000 CAPSULE ORAL WEEKLY
Qty: 12 CAPSULE | Refills: 0 | Status: SHIPPED | OUTPATIENT
Start: 2025-05-25

## 2025-06-09 DIAGNOSIS — E78.2 MIXED HYPERLIPIDEMIA: ICD-10-CM

## 2025-06-09 DIAGNOSIS — F41.9 ANXIETY: ICD-10-CM

## 2025-06-09 RX ORDER — ROSUVASTATIN CALCIUM 20 MG/1
20 TABLET, COATED ORAL DAILY
Qty: 90 TABLET | Refills: 1 | Status: SHIPPED | OUTPATIENT
Start: 2025-06-09

## 2025-06-09 RX ORDER — EZETIMIBE 10 MG/1
10 TABLET ORAL DAILY
Qty: 90 TABLET | Refills: 1 | Status: SHIPPED | OUTPATIENT
Start: 2025-06-09

## 2025-06-10 RX ORDER — ESCITALOPRAM OXALATE 10 MG/1
10 TABLET ORAL DAILY
Qty: 30 TABLET | Refills: 5 | Status: SHIPPED | OUTPATIENT
Start: 2025-06-10

## 2025-06-19 ENCOUNTER — HOSPITAL ENCOUNTER (OUTPATIENT)
Dept: NON INVASIVE DIAGNOSTICS | Facility: HOSPITAL | Age: 70
Discharge: HOME | End: 2025-06-19
Attending: INTERNAL MEDICINE
Payer: MEDICARE

## 2025-06-19 VITALS
HEART RATE: 80 BPM | SYSTOLIC BLOOD PRESSURE: 150 MMHG | WEIGHT: 263 LBS | BODY MASS INDEX: 38.95 KG/M2 | DIASTOLIC BLOOD PRESSURE: 90 MMHG | HEIGHT: 69 IN

## 2025-06-19 DIAGNOSIS — I25.119 ATHEROSCLEROSIS OF CORONARY ARTERY WITH ANGINA PECTORIS, UNSPECIFIED VESSEL OR LESION TYPE, UNSPECIFIED WHETHER NATIVE OR TRANSPLANTED HEART (HCC): ICD-10-CM

## 2025-06-19 DIAGNOSIS — I10 BENIGN ESSENTIAL HYPERTENSION: ICD-10-CM

## 2025-06-19 LAB
AORTIC ROOT: 3.5 CM
ASCENDING AORTA: 3.2 CM
BSA FOR ECHO PROCEDURE: 2.32 M2
E WAVE DECELERATION TIME: 308 MS
E/A RATIO: 0.66
FRACTIONAL SHORTENING: 33 (ref 28–44)
INTERVENTRICULAR SEPTUM IN DIASTOLE (PARASTERNAL SHORT AXIS VIEW): 1.6 CM
INTERVENTRICULAR SEPTUM: 1.6 CM (ref 0.6–1.1)
LAAS-AP2: 17.4 CM2
LAAS-AP4: 11.4 CM2
LEFT ATRIUM SIZE: 3.6 CM
LEFT ATRIUM VOLUME (MOD BIPLANE): 39 ML
LEFT ATRIUM VOLUME INDEX (MOD BIPLANE): 16.8 ML/M2
LEFT INTERNAL DIMENSION IN SYSTOLE: 2.4 CM (ref 2.1–4)
LEFT VENTRICULAR INTERNAL DIMENSION IN DIASTOLE: 3.6 CM (ref 3.5–6)
LEFT VENTRICULAR POSTERIOR WALL IN END DIASTOLE: 1.3 CM
LEFT VENTRICULAR STROKE VOLUME: 35 ML
LV EF US.2D.A4C+ESTIMATED: 58 %
LVSV (TEICH): 35 ML
MV E'TISSUE VEL-SEP: 9 CM/S
MV PEAK A VEL: 1.06 M/S
MV PEAK E VEL: 70 CM/S
MV STENOSIS PRESSURE HALF TIME: 89 MS
MV VALVE AREA P 1/2 METHOD: 2.5
RIGHT ATRIUM AREA SYSTOLE A4C: 12.6 CM2
RIGHT VENTRICLE ID DIMENSION: 3.2 CM
SL CV LEFT ATRIUM LENGTH A2C: 5.1 CM
SL CV LV EF: 60
SL CV PED ECHO LEFT VENTRICLE DIASTOLIC VOLUME (MOD BIPLANE) 2D: 55 ML
SL CV PED ECHO LEFT VENTRICLE SYSTOLIC VOLUME (MOD BIPLANE) 2D: 20 ML
TR MAX PG: 28 MMHG
TR PEAK VELOCITY: 2.7 M/S
TRICUSPID ANNULAR PLANE SYSTOLIC EXCURSION: 2.4 CM
TRICUSPID VALVE PEAK REGURGITATION VELOCITY: 2.65 M/S

## 2025-06-19 PROCEDURE — 93306 TTE W/DOPPLER COMPLETE: CPT | Performed by: INTERNAL MEDICINE

## 2025-06-19 PROCEDURE — 93306 TTE W/DOPPLER COMPLETE: CPT

## 2025-07-14 ENCOUNTER — APPOINTMENT (OUTPATIENT)
Dept: LAB | Facility: MEDICAL CENTER | Age: 70
End: 2025-07-14
Attending: NURSE PRACTITIONER
Payer: MEDICARE

## 2025-07-14 LAB
ALBUMIN SERPL BCG-MCNC: 4.1 G/DL (ref 3.5–5)
ALP SERPL-CCNC: 35 U/L (ref 34–104)
ALT SERPL W P-5'-P-CCNC: 22 U/L (ref 7–52)
ANION GAP SERPL CALCULATED.3IONS-SCNC: 7 MMOL/L (ref 4–13)
AST SERPL W P-5'-P-CCNC: 32 U/L (ref 13–39)
BASOPHILS # BLD AUTO: 0.02 THOUSANDS/ÂΜL (ref 0–0.1)
BASOPHILS NFR BLD AUTO: 0 % (ref 0–1)
BILIRUB SERPL-MCNC: 0.79 MG/DL (ref 0.2–1)
BUN SERPL-MCNC: 18 MG/DL (ref 5–25)
CALCIUM SERPL-MCNC: 8.9 MG/DL (ref 8.4–10.2)
CHLORIDE SERPL-SCNC: 106 MMOL/L (ref 96–108)
CHOLEST SERPL-MCNC: 122 MG/DL (ref ?–200)
CO2 SERPL-SCNC: 25 MMOL/L (ref 21–32)
CREAT SERPL-MCNC: 1.25 MG/DL (ref 0.6–1.3)
EOSINOPHIL # BLD AUTO: 0.26 THOUSAND/ÂΜL (ref 0–0.61)
EOSINOPHIL NFR BLD AUTO: 5 % (ref 0–6)
ERYTHROCYTE [DISTWIDTH] IN BLOOD BY AUTOMATED COUNT: 13.1 % (ref 11.6–15.1)
EST. AVERAGE GLUCOSE BLD GHB EST-MCNC: 148 MG/DL
GFR SERPL CREATININE-BSD FRML MDRD: 57 ML/MIN/1.73SQ M
GLUCOSE P FAST SERPL-MCNC: 113 MG/DL (ref 65–99)
HBA1C MFR BLD: 6.8 %
HCT VFR BLD AUTO: 46.4 % (ref 36.5–49.3)
HDLC SERPL-MCNC: 43 MG/DL
HGB BLD-MCNC: 16.1 G/DL (ref 12–17)
IMM GRANULOCYTES # BLD AUTO: 0.02 THOUSAND/UL (ref 0–0.2)
IMM GRANULOCYTES NFR BLD AUTO: 0 % (ref 0–2)
LDLC SERPL CALC-MCNC: 52 MG/DL (ref 0–100)
LYMPHOCYTES # BLD AUTO: 1.92 THOUSANDS/ÂΜL (ref 0.6–4.47)
LYMPHOCYTES NFR BLD AUTO: 36 % (ref 14–44)
MCH RBC QN AUTO: 31 PG (ref 26.8–34.3)
MCHC RBC AUTO-ENTMCNC: 34.7 G/DL (ref 31.4–37.4)
MCV RBC AUTO: 89 FL (ref 82–98)
MONOCYTES # BLD AUTO: 0.55 THOUSAND/ÂΜL (ref 0.17–1.22)
MONOCYTES NFR BLD AUTO: 10 % (ref 4–12)
NEUTROPHILS # BLD AUTO: 2.59 THOUSANDS/ÂΜL (ref 1.85–7.62)
NEUTS SEG NFR BLD AUTO: 49 % (ref 43–75)
NONHDLC SERPL-MCNC: 79 MG/DL
NRBC BLD AUTO-RTO: 0 /100 WBCS
PLATELET # BLD AUTO: 208 THOUSANDS/UL (ref 149–390)
PMV BLD AUTO: 11.9 FL (ref 8.9–12.7)
POTASSIUM SERPL-SCNC: 4.7 MMOL/L (ref 3.5–5.3)
PROT SERPL-MCNC: 6.5 G/DL (ref 6.4–8.4)
RBC # BLD AUTO: 5.2 MILLION/UL (ref 3.88–5.62)
SODIUM SERPL-SCNC: 138 MMOL/L (ref 135–147)
TRIGL SERPL-MCNC: 133 MG/DL (ref ?–150)
TSH SERPL DL<=0.05 MIU/L-ACNC: 2.99 UIU/ML (ref 0.45–4.5)
WBC # BLD AUTO: 5.36 THOUSAND/UL (ref 4.31–10.16)

## 2025-07-18 DIAGNOSIS — Z95.5 PRESENCE OF DRUG COATED STENT IN LAD CORONARY ARTERY: Primary | ICD-10-CM

## 2025-07-18 RX ORDER — CLINDAMYCIN HYDROCHLORIDE 150 MG/1
CAPSULE ORAL
Qty: 4 CAPSULE | Refills: 0 | Status: SHIPPED | OUTPATIENT
Start: 2025-07-18 | End: 2025-07-18

## 2025-07-18 NOTE — TELEPHONE ENCOUNTER
Patient needs meds for dental appt 7/22/25, has new dentist who asked him to get from PCP    Reason for call:   [x] Refill   [] Prior Auth  [] Other:     Office:   [x] PCP/Provider - Katt  [] Specialty/Provider -     Medication: clindamycin (CLEOCIN) 150 mg     Dose/Frequency: Take 600 mg by mouth as needed 1 hour prior to dental appointment,     Quantity: 4    Pharmacy: [facility]   Does the patient have enough for 3 days?   [] Yes   [x] No - Send as HP to POD    Mail Away Pharmacy   Does the patient have enough for 10 days?   [] Yes   [] No - Send as HP to POD

## 2025-07-28 ENCOUNTER — TELEPHONE (OUTPATIENT)
Age: 70
End: 2025-07-28